# Patient Record
Sex: FEMALE | Race: WHITE | Employment: OTHER | URBAN - NONMETROPOLITAN AREA
[De-identification: names, ages, dates, MRNs, and addresses within clinical notes are randomized per-mention and may not be internally consistent; named-entity substitution may affect disease eponyms.]

---

## 2017-04-11 ENCOUNTER — OFFICE VISIT (OUTPATIENT)
Dept: CARDIOLOGY | Age: 77
End: 2017-04-11

## 2017-04-11 VITALS
WEIGHT: 165 LBS | HEIGHT: 60 IN | SYSTOLIC BLOOD PRESSURE: 146 MMHG | BODY MASS INDEX: 32.39 KG/M2 | HEART RATE: 103 BPM | DIASTOLIC BLOOD PRESSURE: 80 MMHG

## 2017-04-11 DIAGNOSIS — R00.0 SINUS TACHYCARDIA: ICD-10-CM

## 2017-04-11 DIAGNOSIS — R07.9 CHEST PAIN ON EXERTION: Primary | ICD-10-CM

## 2017-04-11 DIAGNOSIS — R00.2 HEART PALPITATIONS: ICD-10-CM

## 2017-04-11 DIAGNOSIS — I10 ESSENTIAL HYPERTENSION: ICD-10-CM

## 2017-04-11 DIAGNOSIS — E78.00 PURE HYPERCHOLESTEROLEMIA: ICD-10-CM

## 2017-04-11 PROCEDURE — G8417 CALC BMI ABV UP PARAM F/U: HCPCS | Performed by: INTERNAL MEDICINE

## 2017-04-11 PROCEDURE — 1123F ACP DISCUSS/DSCN MKR DOCD: CPT | Performed by: INTERNAL MEDICINE

## 2017-04-11 PROCEDURE — 1036F TOBACCO NON-USER: CPT | Performed by: INTERNAL MEDICINE

## 2017-04-11 PROCEDURE — 99214 OFFICE O/P EST MOD 30 MIN: CPT | Performed by: INTERNAL MEDICINE

## 2017-04-11 PROCEDURE — G8427 DOCREV CUR MEDS BY ELIG CLIN: HCPCS | Performed by: INTERNAL MEDICINE

## 2017-04-11 PROCEDURE — 1090F PRES/ABSN URINE INCON ASSESS: CPT | Performed by: INTERNAL MEDICINE

## 2017-04-11 PROCEDURE — 4040F PNEUMOC VAC/ADMIN/RCVD: CPT | Performed by: INTERNAL MEDICINE

## 2017-04-11 PROCEDURE — G8400 PT W/DXA NO RESULTS DOC: HCPCS | Performed by: INTERNAL MEDICINE

## 2017-04-11 PROCEDURE — 93000 ELECTROCARDIOGRAM COMPLETE: CPT | Performed by: INTERNAL MEDICINE

## 2017-04-11 RX ORDER — DILTIAZEM HYDROCHLORIDE 120 MG/1
120 CAPSULE, COATED, EXTENDED RELEASE ORAL DAILY
Qty: 30 CAPSULE | Refills: 5 | Status: SHIPPED | OUTPATIENT
Start: 2017-04-11 | End: 2017-08-31 | Stop reason: SDUPTHER

## 2017-04-11 RX ORDER — ATORVASTATIN CALCIUM 20 MG/1
20 TABLET, FILM COATED ORAL DAILY
Qty: 90 TABLET | Refills: 1 | Status: SHIPPED | OUTPATIENT
Start: 2017-04-11 | End: 2017-08-31 | Stop reason: SDUPTHER

## 2017-04-11 RX ORDER — LISINOPRIL 20 MG/1
20 TABLET ORAL DAILY
Qty: 90 TABLET | Refills: 1 | Status: ON HOLD | OUTPATIENT
Start: 2017-04-11 | End: 2017-05-04

## 2017-04-20 ENCOUNTER — TELEPHONE (OUTPATIENT)
Dept: CARDIOLOGY | Age: 77
End: 2017-04-20

## 2017-04-28 ENCOUNTER — OFFICE VISIT (OUTPATIENT)
Dept: CARDIOLOGY | Age: 77
End: 2017-04-28

## 2017-04-28 VITALS
WEIGHT: 164.2 LBS | HEART RATE: 91 BPM | BODY MASS INDEX: 32.24 KG/M2 | SYSTOLIC BLOOD PRESSURE: 155 MMHG | DIASTOLIC BLOOD PRESSURE: 67 MMHG | HEIGHT: 60 IN

## 2017-04-28 DIAGNOSIS — I10 ESSENTIAL HYPERTENSION: ICD-10-CM

## 2017-04-28 DIAGNOSIS — R00.2 HEART PALPITATIONS: ICD-10-CM

## 2017-04-28 DIAGNOSIS — R94.39 ABNORMAL NUCLEAR STRESS TEST: ICD-10-CM

## 2017-04-28 DIAGNOSIS — R07.9 CHEST PAIN ON EXERTION: Primary | ICD-10-CM

## 2017-04-28 DIAGNOSIS — E78.00 PURE HYPERCHOLESTEROLEMIA: ICD-10-CM

## 2017-04-28 PROCEDURE — 99213 OFFICE O/P EST LOW 20 MIN: CPT | Performed by: INTERNAL MEDICINE

## 2017-04-28 PROCEDURE — 1123F ACP DISCUSS/DSCN MKR DOCD: CPT | Performed by: INTERNAL MEDICINE

## 2017-04-28 PROCEDURE — G8400 PT W/DXA NO RESULTS DOC: HCPCS | Performed by: INTERNAL MEDICINE

## 2017-04-28 PROCEDURE — 1036F TOBACCO NON-USER: CPT | Performed by: INTERNAL MEDICINE

## 2017-04-28 PROCEDURE — 1090F PRES/ABSN URINE INCON ASSESS: CPT | Performed by: INTERNAL MEDICINE

## 2017-04-28 PROCEDURE — G8427 DOCREV CUR MEDS BY ELIG CLIN: HCPCS | Performed by: INTERNAL MEDICINE

## 2017-04-28 PROCEDURE — G8417 CALC BMI ABV UP PARAM F/U: HCPCS | Performed by: INTERNAL MEDICINE

## 2017-04-28 PROCEDURE — 4040F PNEUMOC VAC/ADMIN/RCVD: CPT | Performed by: INTERNAL MEDICINE

## 2017-05-03 PROBLEM — Z95.820 S/P ANGIOPLASTY WITH STENT: Status: ACTIVE | Noted: 2017-05-03

## 2017-05-03 PROBLEM — Z98.890 S/P CARDIAC CATH: Status: ACTIVE | Noted: 2017-05-03

## 2017-05-05 ENCOUNTER — OFFICE VISIT (OUTPATIENT)
Dept: CARDIOLOGY | Age: 77
End: 2017-05-05

## 2017-05-05 VITALS
DIASTOLIC BLOOD PRESSURE: 77 MMHG | WEIGHT: 164.2 LBS | HEIGHT: 60 IN | SYSTOLIC BLOOD PRESSURE: 144 MMHG | HEART RATE: 95 BPM | BODY MASS INDEX: 32.24 KG/M2

## 2017-05-05 DIAGNOSIS — Z95.820 S/P ANGIOPLASTY WITH STENT: Primary | ICD-10-CM

## 2017-05-05 DIAGNOSIS — E78.00 PURE HYPERCHOLESTEROLEMIA: ICD-10-CM

## 2017-05-05 DIAGNOSIS — Z98.890 S/P CARDIAC CATH: ICD-10-CM

## 2017-05-05 DIAGNOSIS — I25.10 CORONARY ARTERY DISEASE INVOLVING NATIVE CORONARY ARTERY OF NATIVE HEART WITHOUT ANGINA PECTORIS: ICD-10-CM

## 2017-05-05 DIAGNOSIS — I10 ESSENTIAL HYPERTENSION: ICD-10-CM

## 2017-05-05 PROCEDURE — G8417 CALC BMI ABV UP PARAM F/U: HCPCS | Performed by: INTERNAL MEDICINE

## 2017-05-05 PROCEDURE — 4040F PNEUMOC VAC/ADMIN/RCVD: CPT | Performed by: INTERNAL MEDICINE

## 2017-05-05 PROCEDURE — 1036F TOBACCO NON-USER: CPT | Performed by: INTERNAL MEDICINE

## 2017-05-05 PROCEDURE — G8427 DOCREV CUR MEDS BY ELIG CLIN: HCPCS | Performed by: INTERNAL MEDICINE

## 2017-05-05 PROCEDURE — 1123F ACP DISCUSS/DSCN MKR DOCD: CPT | Performed by: INTERNAL MEDICINE

## 2017-05-05 PROCEDURE — 1090F PRES/ABSN URINE INCON ASSESS: CPT | Performed by: INTERNAL MEDICINE

## 2017-05-05 PROCEDURE — G8598 ASA/ANTIPLAT THER USED: HCPCS | Performed by: INTERNAL MEDICINE

## 2017-05-05 PROCEDURE — 99213 OFFICE O/P EST LOW 20 MIN: CPT | Performed by: INTERNAL MEDICINE

## 2017-05-05 PROCEDURE — G8400 PT W/DXA NO RESULTS DOC: HCPCS | Performed by: INTERNAL MEDICINE

## 2017-05-15 ENCOUNTER — TELEPHONE (OUTPATIENT)
Dept: CARDIOLOGY | Age: 77
End: 2017-05-15

## 2017-05-26 ENCOUNTER — OFFICE VISIT (OUTPATIENT)
Dept: CARDIOLOGY | Age: 77
End: 2017-05-26

## 2017-05-26 VITALS
BODY MASS INDEX: 31.22 KG/M2 | HEART RATE: 72 BPM | HEIGHT: 60 IN | SYSTOLIC BLOOD PRESSURE: 126 MMHG | WEIGHT: 159 LBS | DIASTOLIC BLOOD PRESSURE: 70 MMHG

## 2017-05-26 DIAGNOSIS — R00.2 HEART PALPITATIONS: ICD-10-CM

## 2017-05-26 DIAGNOSIS — I25.10 CORONARY ARTERY DISEASE INVOLVING NATIVE CORONARY ARTERY OF NATIVE HEART WITHOUT ANGINA PECTORIS: Primary | ICD-10-CM

## 2017-05-26 DIAGNOSIS — I10 ESSENTIAL HYPERTENSION: ICD-10-CM

## 2017-05-26 DIAGNOSIS — E78.00 PURE HYPERCHOLESTEROLEMIA: ICD-10-CM

## 2017-05-26 DIAGNOSIS — Z95.820 S/P ANGIOPLASTY WITH STENT: ICD-10-CM

## 2017-05-26 DIAGNOSIS — Z98.890 S/P CARDIAC CATH: ICD-10-CM

## 2017-05-26 PROCEDURE — G8417 CALC BMI ABV UP PARAM F/U: HCPCS | Performed by: INTERNAL MEDICINE

## 2017-05-26 PROCEDURE — 99214 OFFICE O/P EST MOD 30 MIN: CPT | Performed by: INTERNAL MEDICINE

## 2017-05-26 PROCEDURE — G8400 PT W/DXA NO RESULTS DOC: HCPCS | Performed by: INTERNAL MEDICINE

## 2017-05-26 PROCEDURE — 1123F ACP DISCUSS/DSCN MKR DOCD: CPT | Performed by: INTERNAL MEDICINE

## 2017-05-26 PROCEDURE — G8598 ASA/ANTIPLAT THER USED: HCPCS | Performed by: INTERNAL MEDICINE

## 2017-05-26 PROCEDURE — 4040F PNEUMOC VAC/ADMIN/RCVD: CPT | Performed by: INTERNAL MEDICINE

## 2017-05-26 PROCEDURE — G8427 DOCREV CUR MEDS BY ELIG CLIN: HCPCS | Performed by: INTERNAL MEDICINE

## 2017-05-26 PROCEDURE — 1036F TOBACCO NON-USER: CPT | Performed by: INTERNAL MEDICINE

## 2017-05-26 PROCEDURE — 1090F PRES/ABSN URINE INCON ASSESS: CPT | Performed by: INTERNAL MEDICINE

## 2017-05-26 RX ORDER — LISINOPRIL 10 MG/1
10 TABLET ORAL DAILY
Qty: 90 TABLET | Refills: 2 | Status: SHIPPED | OUTPATIENT
Start: 2017-05-26 | End: 2019-01-14 | Stop reason: SDUPTHER

## 2017-05-26 RX ORDER — METOPROLOL TARTRATE 50 MG/1
50 TABLET, FILM COATED ORAL 2 TIMES DAILY
Qty: 180 TABLET | Refills: 2 | Status: SHIPPED | OUTPATIENT
Start: 2017-05-26 | End: 2017-08-31 | Stop reason: SDUPTHER

## 2017-06-05 ENCOUNTER — TELEPHONE (OUTPATIENT)
Dept: ENDOCRINOLOGY | Age: 77
End: 2017-06-05

## 2017-06-07 ENCOUNTER — OFFICE VISIT (OUTPATIENT)
Dept: ENDOCRINOLOGY | Age: 77
End: 2017-06-07

## 2017-06-07 VITALS
SYSTOLIC BLOOD PRESSURE: 138 MMHG | DIASTOLIC BLOOD PRESSURE: 62 MMHG | HEART RATE: 55 BPM | HEIGHT: 60 IN | RESPIRATION RATE: 20 BRPM | BODY MASS INDEX: 31.51 KG/M2 | WEIGHT: 160.5 LBS

## 2017-06-07 DIAGNOSIS — E04.2 MULTINODULAR GOITER: Primary | ICD-10-CM

## 2017-06-07 DIAGNOSIS — R94.6 ABNORMAL THYROID FUNCTION TEST: ICD-10-CM

## 2017-06-07 PROCEDURE — 4040F PNEUMOC VAC/ADMIN/RCVD: CPT | Performed by: INTERNAL MEDICINE

## 2017-06-07 PROCEDURE — 1036F TOBACCO NON-USER: CPT | Performed by: INTERNAL MEDICINE

## 2017-06-07 PROCEDURE — G8427 DOCREV CUR MEDS BY ELIG CLIN: HCPCS | Performed by: INTERNAL MEDICINE

## 2017-06-07 PROCEDURE — G8417 CALC BMI ABV UP PARAM F/U: HCPCS | Performed by: INTERNAL MEDICINE

## 2017-06-07 PROCEDURE — G8598 ASA/ANTIPLAT THER USED: HCPCS | Performed by: INTERNAL MEDICINE

## 2017-06-07 PROCEDURE — 1090F PRES/ABSN URINE INCON ASSESS: CPT | Performed by: INTERNAL MEDICINE

## 2017-06-07 PROCEDURE — 1123F ACP DISCUSS/DSCN MKR DOCD: CPT | Performed by: INTERNAL MEDICINE

## 2017-06-07 PROCEDURE — 99214 OFFICE O/P EST MOD 30 MIN: CPT | Performed by: INTERNAL MEDICINE

## 2017-06-07 PROCEDURE — G8400 PT W/DXA NO RESULTS DOC: HCPCS | Performed by: INTERNAL MEDICINE

## 2017-06-14 ENCOUNTER — TELEPHONE (OUTPATIENT)
Dept: CARDIOLOGY | Age: 77
End: 2017-06-14

## 2017-06-22 DIAGNOSIS — E04.2 MULTINODULAR GOITER: Primary | ICD-10-CM

## 2017-06-23 ENCOUNTER — TELEPHONE (OUTPATIENT)
Dept: ENDOCRINOLOGY | Age: 77
End: 2017-06-23

## 2017-06-23 DIAGNOSIS — E04.2 MULTINODULAR GOITER: Primary | ICD-10-CM

## 2017-07-17 ENCOUNTER — HOSPITAL ENCOUNTER (OUTPATIENT)
Dept: CARDIAC REHAB | Age: 77
Setting detail: THERAPIES SERIES
Discharge: HOME | End: 2017-07-17
Payer: MEDICARE

## 2017-07-17 ENCOUNTER — APPOINTMENT (OUTPATIENT)
Dept: CARDIAC REHAB | Age: 77
End: 2017-07-17
Payer: MEDICARE

## 2017-07-17 PROCEDURE — G0422 INTENS CARDIAC REHAB W/EXERC: HCPCS

## 2017-07-17 PROCEDURE — G0423 INTENS CARDIAC REHAB NO EXER: HCPCS

## 2017-07-19 ENCOUNTER — HOSPITAL ENCOUNTER (OUTPATIENT)
Dept: CARDIAC REHAB | Age: 77
Setting detail: THERAPIES SERIES
Discharge: HOME | End: 2017-07-19
Payer: MEDICARE

## 2017-07-19 PROCEDURE — G0423 INTENS CARDIAC REHAB NO EXER: HCPCS

## 2017-07-19 PROCEDURE — G0422 INTENS CARDIAC REHAB W/EXERC: HCPCS

## 2017-07-20 ENCOUNTER — HOSPITAL ENCOUNTER (OUTPATIENT)
Age: 77
Setting detail: THERAPIES SERIES
Discharge: HOME | End: 2017-07-20
Payer: MEDICARE

## 2017-07-20 ENCOUNTER — HOSPITAL ENCOUNTER (OUTPATIENT)
Age: 77
Setting detail: THERAPIES SERIES
End: 2017-07-20
Payer: MEDICARE

## 2017-07-21 ENCOUNTER — HOSPITAL ENCOUNTER (OUTPATIENT)
Dept: CARDIAC REHAB | Age: 77
Setting detail: THERAPIES SERIES
Discharge: HOME | End: 2017-07-21
Payer: MEDICARE

## 2017-07-21 PROCEDURE — G0422 INTENS CARDIAC REHAB W/EXERC: HCPCS

## 2017-07-21 PROCEDURE — G0423 INTENS CARDIAC REHAB NO EXER: HCPCS

## 2017-07-24 ENCOUNTER — HOSPITAL ENCOUNTER (OUTPATIENT)
Age: 77
Setting detail: THERAPIES SERIES
Discharge: HOME | End: 2017-07-24
Payer: MEDICARE

## 2017-07-24 ENCOUNTER — HOSPITAL ENCOUNTER (OUTPATIENT)
Dept: CARDIAC REHAB | Age: 77
Setting detail: THERAPIES SERIES
Discharge: HOME | End: 2017-07-24
Payer: MEDICARE

## 2017-07-24 PROCEDURE — G0422 INTENS CARDIAC REHAB W/EXERC: HCPCS

## 2017-07-24 PROCEDURE — G0423 INTENS CARDIAC REHAB NO EXER: HCPCS

## 2017-07-26 ENCOUNTER — HOSPITAL ENCOUNTER (OUTPATIENT)
Dept: CARDIAC REHAB | Age: 77
Setting detail: THERAPIES SERIES
Discharge: HOME | End: 2017-07-26
Payer: MEDICARE

## 2017-07-26 PROCEDURE — G0422 INTENS CARDIAC REHAB W/EXERC: HCPCS

## 2017-07-26 PROCEDURE — G0423 INTENS CARDIAC REHAB NO EXER: HCPCS

## 2017-07-28 ENCOUNTER — APPOINTMENT (OUTPATIENT)
Dept: CARDIAC REHAB | Age: 77
End: 2017-07-28
Payer: MEDICARE

## 2017-07-31 ENCOUNTER — HOSPITAL ENCOUNTER (OUTPATIENT)
Dept: CARDIAC REHAB | Age: 77
Setting detail: THERAPIES SERIES
Discharge: HOME | End: 2017-07-31
Payer: MEDICARE

## 2017-07-31 PROCEDURE — G0422 INTENS CARDIAC REHAB W/EXERC: HCPCS

## 2017-07-31 PROCEDURE — G0423 INTENS CARDIAC REHAB NO EXER: HCPCS

## 2017-08-02 ENCOUNTER — HOSPITAL ENCOUNTER (OUTPATIENT)
Dept: CARDIAC REHAB | Age: 77
Setting detail: THERAPIES SERIES
Discharge: HOME | End: 2017-08-02
Payer: MEDICARE

## 2017-08-02 PROCEDURE — G0423 INTENS CARDIAC REHAB NO EXER: HCPCS

## 2017-08-02 PROCEDURE — G0422 INTENS CARDIAC REHAB W/EXERC: HCPCS

## 2017-08-04 ENCOUNTER — HOSPITAL ENCOUNTER (OUTPATIENT)
Dept: CARDIAC REHAB | Age: 77
Setting detail: THERAPIES SERIES
Discharge: HOME | End: 2017-08-04
Payer: MEDICARE

## 2017-08-04 PROCEDURE — G0422 INTENS CARDIAC REHAB W/EXERC: HCPCS

## 2017-08-04 PROCEDURE — G0423 INTENS CARDIAC REHAB NO EXER: HCPCS

## 2017-08-07 ENCOUNTER — HOSPITAL ENCOUNTER (OUTPATIENT)
Dept: CARDIAC REHAB | Age: 77
Setting detail: THERAPIES SERIES
Discharge: HOME | End: 2017-08-07
Payer: MEDICARE

## 2017-08-07 PROCEDURE — G0423 INTENS CARDIAC REHAB NO EXER: HCPCS

## 2017-08-07 PROCEDURE — G0422 INTENS CARDIAC REHAB W/EXERC: HCPCS

## 2017-08-09 ENCOUNTER — HOSPITAL ENCOUNTER (OUTPATIENT)
Dept: CARDIAC REHAB | Age: 77
Setting detail: THERAPIES SERIES
Discharge: HOME | End: 2017-08-09
Payer: MEDICARE

## 2017-08-09 PROCEDURE — G0422 INTENS CARDIAC REHAB W/EXERC: HCPCS

## 2017-08-09 PROCEDURE — G0423 INTENS CARDIAC REHAB NO EXER: HCPCS

## 2017-08-11 ENCOUNTER — HOSPITAL ENCOUNTER (OUTPATIENT)
Dept: CARDIAC REHAB | Age: 77
Setting detail: THERAPIES SERIES
Discharge: HOME | End: 2017-08-11
Payer: MEDICARE

## 2017-08-11 PROCEDURE — G0422 INTENS CARDIAC REHAB W/EXERC: HCPCS

## 2017-08-11 PROCEDURE — G0423 INTENS CARDIAC REHAB NO EXER: HCPCS

## 2017-08-14 ENCOUNTER — HOSPITAL ENCOUNTER (OUTPATIENT)
Dept: CARDIAC REHAB | Age: 77
Setting detail: THERAPIES SERIES
Discharge: HOME | End: 2017-08-14
Payer: MEDICARE

## 2017-08-14 PROCEDURE — G0423 INTENS CARDIAC REHAB NO EXER: HCPCS

## 2017-08-14 PROCEDURE — G0422 INTENS CARDIAC REHAB W/EXERC: HCPCS

## 2017-08-16 ENCOUNTER — HOSPITAL ENCOUNTER (OUTPATIENT)
Dept: CARDIAC REHAB | Age: 77
Setting detail: THERAPIES SERIES
Discharge: HOME | End: 2017-08-16
Payer: MEDICARE

## 2017-08-16 PROCEDURE — G0423 INTENS CARDIAC REHAB NO EXER: HCPCS

## 2017-08-16 PROCEDURE — G0422 INTENS CARDIAC REHAB W/EXERC: HCPCS

## 2017-08-18 ENCOUNTER — HOSPITAL ENCOUNTER (OUTPATIENT)
Dept: CARDIAC REHAB | Age: 77
Setting detail: THERAPIES SERIES
Discharge: HOME | End: 2017-08-18
Payer: MEDICARE

## 2017-08-18 PROCEDURE — G0422 INTENS CARDIAC REHAB W/EXERC: HCPCS

## 2017-08-18 PROCEDURE — G0423 INTENS CARDIAC REHAB NO EXER: HCPCS

## 2017-08-21 ENCOUNTER — HOSPITAL ENCOUNTER (OUTPATIENT)
Dept: CARDIAC REHAB | Age: 77
Setting detail: THERAPIES SERIES
Discharge: HOME | End: 2017-08-21
Payer: MEDICARE

## 2017-08-21 PROCEDURE — G0422 INTENS CARDIAC REHAB W/EXERC: HCPCS

## 2017-08-21 PROCEDURE — G0423 INTENS CARDIAC REHAB NO EXER: HCPCS

## 2017-08-23 ENCOUNTER — HOSPITAL ENCOUNTER (OUTPATIENT)
Dept: CARDIAC REHAB | Age: 77
Setting detail: THERAPIES SERIES
Discharge: HOME | End: 2017-08-23
Payer: MEDICARE

## 2017-08-23 PROCEDURE — G0423 INTENS CARDIAC REHAB NO EXER: HCPCS

## 2017-08-23 PROCEDURE — G0422 INTENS CARDIAC REHAB W/EXERC: HCPCS

## 2017-08-25 ENCOUNTER — HOSPITAL ENCOUNTER (OUTPATIENT)
Dept: CARDIAC REHAB | Age: 77
Setting detail: THERAPIES SERIES
Discharge: HOME | End: 2017-08-25
Payer: MEDICARE

## 2017-08-25 PROCEDURE — G0422 INTENS CARDIAC REHAB W/EXERC: HCPCS

## 2017-08-25 PROCEDURE — G0423 INTENS CARDIAC REHAB NO EXER: HCPCS

## 2017-08-28 ENCOUNTER — HOSPITAL ENCOUNTER (OUTPATIENT)
Dept: CARDIAC REHAB | Age: 77
Setting detail: THERAPIES SERIES
Discharge: HOME | End: 2017-08-28
Payer: MEDICARE

## 2017-08-28 PROCEDURE — G0422 INTENS CARDIAC REHAB W/EXERC: HCPCS

## 2017-08-28 PROCEDURE — G0423 INTENS CARDIAC REHAB NO EXER: HCPCS

## 2017-08-30 ENCOUNTER — HOSPITAL ENCOUNTER (OUTPATIENT)
Dept: CARDIAC REHAB | Age: 77
Setting detail: THERAPIES SERIES
Discharge: HOME | End: 2017-08-30
Payer: MEDICARE

## 2017-08-30 PROCEDURE — G0422 INTENS CARDIAC REHAB W/EXERC: HCPCS

## 2017-08-30 PROCEDURE — G0423 INTENS CARDIAC REHAB NO EXER: HCPCS

## 2017-08-31 ENCOUNTER — OFFICE VISIT (OUTPATIENT)
Dept: CARDIOLOGY CLINIC | Age: 77
End: 2017-08-31
Payer: MEDICARE

## 2017-08-31 ENCOUNTER — HOSPITAL ENCOUNTER (OUTPATIENT)
Dept: NON INVASIVE DIAGNOSTICS | Age: 77
Discharge: HOME | End: 2017-08-31
Payer: MEDICARE

## 2017-08-31 VITALS
HEART RATE: 52 BPM | HEIGHT: 60 IN | WEIGHT: 154.5 LBS | DIASTOLIC BLOOD PRESSURE: 79 MMHG | SYSTOLIC BLOOD PRESSURE: 152 MMHG | BODY MASS INDEX: 30.33 KG/M2

## 2017-08-31 DIAGNOSIS — R00.1 SINUS BRADYCARDIA: ICD-10-CM

## 2017-08-31 DIAGNOSIS — E78.00 PURE HYPERCHOLESTEROLEMIA: ICD-10-CM

## 2017-08-31 DIAGNOSIS — Z98.890 S/P CARDIAC CATH: ICD-10-CM

## 2017-08-31 DIAGNOSIS — I25.10 CORONARY ARTERY DISEASE INVOLVING NATIVE CORONARY ARTERY OF NATIVE HEART WITHOUT ANGINA PECTORIS: Primary | ICD-10-CM

## 2017-08-31 DIAGNOSIS — I10 ESSENTIAL HYPERTENSION: ICD-10-CM

## 2017-08-31 DIAGNOSIS — I25.10 CORONARY ARTERY DISEASE INVOLVING NATIVE CORONARY ARTERY OF NATIVE HEART WITHOUT ANGINA PECTORIS: ICD-10-CM

## 2017-08-31 PROBLEM — R07.9 CHEST PAIN ON EXERTION: Status: RESOLVED | Noted: 2017-04-11 | Resolved: 2017-08-31

## 2017-08-31 PROBLEM — R94.39 ABNORMAL NUCLEAR STRESS TEST: Status: RESOLVED | Noted: 2017-04-28 | Resolved: 2017-08-31

## 2017-08-31 PROCEDURE — G8598 ASA/ANTIPLAT THER USED: HCPCS | Performed by: INTERNAL MEDICINE

## 2017-08-31 PROCEDURE — 1090F PRES/ABSN URINE INCON ASSESS: CPT | Performed by: INTERNAL MEDICINE

## 2017-08-31 PROCEDURE — 99213 OFFICE O/P EST LOW 20 MIN: CPT | Performed by: INTERNAL MEDICINE

## 2017-08-31 PROCEDURE — 93225 XTRNL ECG REC<48 HRS REC: CPT

## 2017-08-31 PROCEDURE — G8427 DOCREV CUR MEDS BY ELIG CLIN: HCPCS | Performed by: INTERNAL MEDICINE

## 2017-08-31 PROCEDURE — 93226 XTRNL ECG REC<48 HR SCAN A/R: CPT

## 2017-08-31 PROCEDURE — G8417 CALC BMI ABV UP PARAM F/U: HCPCS | Performed by: INTERNAL MEDICINE

## 2017-08-31 PROCEDURE — 4040F PNEUMOC VAC/ADMIN/RCVD: CPT | Performed by: INTERNAL MEDICINE

## 2017-08-31 PROCEDURE — 1123F ACP DISCUSS/DSCN MKR DOCD: CPT | Performed by: INTERNAL MEDICINE

## 2017-08-31 PROCEDURE — G8400 PT W/DXA NO RESULTS DOC: HCPCS | Performed by: INTERNAL MEDICINE

## 2017-08-31 PROCEDURE — 1036F TOBACCO NON-USER: CPT | Performed by: INTERNAL MEDICINE

## 2017-08-31 RX ORDER — DILTIAZEM HYDROCHLORIDE 120 MG/1
120 CAPSULE, COATED, EXTENDED RELEASE ORAL DAILY
Qty: 90 CAPSULE | Refills: 1 | Status: SHIPPED | OUTPATIENT
Start: 2017-08-31 | End: 2018-01-23 | Stop reason: SDUPTHER

## 2017-08-31 RX ORDER — METOPROLOL TARTRATE 50 MG/1
50 TABLET, FILM COATED ORAL 2 TIMES DAILY
Qty: 180 TABLET | Refills: 1 | Status: SHIPPED | OUTPATIENT
Start: 2017-08-31 | End: 2017-10-31

## 2017-08-31 RX ORDER — ATORVASTATIN CALCIUM 20 MG/1
20 TABLET, FILM COATED ORAL DAILY
Qty: 90 TABLET | Refills: 1 | Status: SHIPPED | OUTPATIENT
Start: 2017-08-31 | End: 2018-07-25 | Stop reason: SDUPTHER

## 2017-09-01 ENCOUNTER — HOSPITAL ENCOUNTER (OUTPATIENT)
Dept: CARDIAC REHAB | Age: 77
Setting detail: THERAPIES SERIES
Discharge: HOME | End: 2017-09-01
Payer: MEDICARE

## 2017-09-01 PROCEDURE — G0422 INTENS CARDIAC REHAB W/EXERC: HCPCS

## 2017-09-01 PROCEDURE — G0423 INTENS CARDIAC REHAB NO EXER: HCPCS

## 2017-09-04 ENCOUNTER — APPOINTMENT (OUTPATIENT)
Dept: CARDIAC REHAB | Age: 77
End: 2017-09-04
Payer: MEDICARE

## 2017-09-06 ENCOUNTER — HOSPITAL ENCOUNTER (OUTPATIENT)
Dept: CARDIAC REHAB | Age: 77
Setting detail: THERAPIES SERIES
Discharge: HOME | End: 2017-09-06
Payer: MEDICARE

## 2017-09-06 PROCEDURE — G0423 INTENS CARDIAC REHAB NO EXER: HCPCS

## 2017-09-06 PROCEDURE — G0422 INTENS CARDIAC REHAB W/EXERC: HCPCS

## 2017-09-08 ENCOUNTER — HOSPITAL ENCOUNTER (OUTPATIENT)
Dept: CARDIAC REHAB | Age: 77
Setting detail: THERAPIES SERIES
Discharge: HOME | End: 2017-09-08
Payer: MEDICARE

## 2017-09-08 PROCEDURE — G0422 INTENS CARDIAC REHAB W/EXERC: HCPCS

## 2017-09-08 PROCEDURE — G0423 INTENS CARDIAC REHAB NO EXER: HCPCS

## 2017-09-11 ENCOUNTER — HOSPITAL ENCOUNTER (OUTPATIENT)
Dept: CARDIAC REHAB | Age: 77
Setting detail: THERAPIES SERIES
Discharge: HOME | End: 2017-09-11
Payer: MEDICARE

## 2017-09-11 PROCEDURE — G0422 INTENS CARDIAC REHAB W/EXERC: HCPCS

## 2017-09-11 PROCEDURE — G0423 INTENS CARDIAC REHAB NO EXER: HCPCS

## 2017-09-13 ENCOUNTER — TELEPHONE (OUTPATIENT)
Dept: ADMINISTRATIVE | Age: 77
End: 2017-09-13

## 2017-09-13 ENCOUNTER — HOSPITAL ENCOUNTER (OUTPATIENT)
Dept: CARDIAC REHAB | Age: 77
Setting detail: THERAPIES SERIES
Discharge: HOME | End: 2017-09-13
Payer: MEDICARE

## 2017-09-13 PROCEDURE — G0423 INTENS CARDIAC REHAB NO EXER: HCPCS

## 2017-09-13 PROCEDURE — G0422 INTENS CARDIAC REHAB W/EXERC: HCPCS

## 2017-09-14 ENCOUNTER — TELEPHONE (OUTPATIENT)
Dept: CARDIOLOGY CLINIC | Age: 77
End: 2017-09-14

## 2017-09-15 ENCOUNTER — HOSPITAL ENCOUNTER (OUTPATIENT)
Dept: CARDIAC REHAB | Age: 77
Setting detail: THERAPIES SERIES
Discharge: HOME | End: 2017-09-15
Payer: MEDICARE

## 2017-09-15 PROCEDURE — G0422 INTENS CARDIAC REHAB W/EXERC: HCPCS

## 2017-09-15 PROCEDURE — G0423 INTENS CARDIAC REHAB NO EXER: HCPCS

## 2017-09-18 ENCOUNTER — HOSPITAL ENCOUNTER (OUTPATIENT)
Dept: CARDIAC REHAB | Age: 77
Setting detail: THERAPIES SERIES
Discharge: HOME | End: 2017-09-18
Payer: MEDICARE

## 2017-09-18 PROCEDURE — G0422 INTENS CARDIAC REHAB W/EXERC: HCPCS

## 2017-09-18 PROCEDURE — G0423 INTENS CARDIAC REHAB NO EXER: HCPCS

## 2017-09-20 ENCOUNTER — HOSPITAL ENCOUNTER (OUTPATIENT)
Dept: CARDIAC REHAB | Age: 77
Setting detail: THERAPIES SERIES
Discharge: HOME | End: 2017-09-20
Payer: MEDICARE

## 2017-09-20 PROCEDURE — G0422 INTENS CARDIAC REHAB W/EXERC: HCPCS

## 2017-09-20 PROCEDURE — G0423 INTENS CARDIAC REHAB NO EXER: HCPCS

## 2017-09-22 ENCOUNTER — HOSPITAL ENCOUNTER (OUTPATIENT)
Dept: CARDIAC REHAB | Age: 77
Setting detail: THERAPIES SERIES
Discharge: HOME | End: 2017-09-22
Payer: MEDICARE

## 2017-09-22 PROCEDURE — G0423 INTENS CARDIAC REHAB NO EXER: HCPCS

## 2017-09-22 PROCEDURE — G0422 INTENS CARDIAC REHAB W/EXERC: HCPCS

## 2017-09-25 ENCOUNTER — HOSPITAL ENCOUNTER (OUTPATIENT)
Dept: CARDIAC REHAB | Age: 77
Setting detail: THERAPIES SERIES
Discharge: HOME | End: 2017-09-25
Payer: MEDICARE

## 2017-09-25 PROCEDURE — G0422 INTENS CARDIAC REHAB W/EXERC: HCPCS

## 2017-09-25 PROCEDURE — G0423 INTENS CARDIAC REHAB NO EXER: HCPCS

## 2017-09-27 ENCOUNTER — HOSPITAL ENCOUNTER (OUTPATIENT)
Dept: CARDIAC REHAB | Age: 77
Setting detail: THERAPIES SERIES
Discharge: HOME | End: 2017-09-27
Payer: MEDICARE

## 2017-09-27 PROCEDURE — G0422 INTENS CARDIAC REHAB W/EXERC: HCPCS

## 2017-09-27 PROCEDURE — G0423 INTENS CARDIAC REHAB NO EXER: HCPCS

## 2017-09-28 ENCOUNTER — HOSPITAL ENCOUNTER (OUTPATIENT)
Dept: ULTRASOUND IMAGING | Age: 77
Discharge: HOME | End: 2017-09-28
Payer: MEDICARE

## 2017-09-28 DIAGNOSIS — E04.2 MULTINODULAR GOITER: ICD-10-CM

## 2017-09-28 PROCEDURE — 76536 US EXAM OF HEAD AND NECK: CPT

## 2017-09-29 ENCOUNTER — HOSPITAL ENCOUNTER (OUTPATIENT)
Dept: CARDIAC REHAB | Age: 77
Setting detail: THERAPIES SERIES
Discharge: HOME | End: 2017-09-29
Payer: MEDICARE

## 2017-09-29 PROCEDURE — G0423 INTENS CARDIAC REHAB NO EXER: HCPCS

## 2017-09-29 PROCEDURE — G0422 INTENS CARDIAC REHAB W/EXERC: HCPCS

## 2017-10-02 ENCOUNTER — HOSPITAL ENCOUNTER (OUTPATIENT)
Dept: CARDIAC REHAB | Age: 77
Setting detail: THERAPIES SERIES
Discharge: HOME | End: 2017-10-02
Payer: MEDICARE

## 2017-10-02 PROCEDURE — G0422 INTENS CARDIAC REHAB W/EXERC: HCPCS

## 2017-10-02 PROCEDURE — G0423 INTENS CARDIAC REHAB NO EXER: HCPCS

## 2017-10-02 NOTE — PLAN OF CARE
Disabled     [x] Retired   Return to work:  Has Sven Jeffrey returned to work? [] Yes    [x] No               Return to work:  na   Orthopedic Limitations/  [x] Yes    [] No  If yes please list:  Surgery on both knees     Orthopedic Limitations  *If patient has orthopedic issue:  Knees   Actions/  accomodations needed to make Sven Jeffrey successful :No AD, uses [employer] Orthopedic Limitations    same Orthopedic Limitations  same   Orthopedic Limitations  TM and NS only     Fall Risk  Fall risk assessed? [x] Yes      [] No    Balance Issues? [x] Yes      [] No     [] Walker [] Cane    [x] Safety issues reviewed      Fall Risk  *If patient is a fall risk, action needed to accommodate:  na Fall Risk Fall Risk Fall Risk  Had no issues   Home Exercise  [x] Yes    [] No  Type: walking  Frequency:daily x2  Duration: 20 min Home Exercise  [] Yes    [] No  Type: **  Frequency:**  Duration: ** Home Exercise  [x] Yes    [] No  Type: walking  Frequency: daily  Duration: 25 min Home Exercise  [x] Yes    [] No  Type: walking and bike  Frequency: daily  Duration: 20-30 min Home Exercise  x Yes    [] No  Type: bike  Frequency: 3d/w  Duration: 30 minutes   Angina with Activity? [] Yes    [x] No  Angina Management: n/a Angina with Activity? [] Yes    [] No  Angina Management: ** Angina with Activity? [] Yes    [x] No  Angina Management: ** Angina with Activity? [] Yes    [x] No  Angina Management: ** Angina with Activity?   [] Yes    [x] No  Angina Management: **   EXERCISE PLAN EXERCISE PLAN EXERCISE PLAN EXERCISE PLAN EXERCISE PLAN   *Interventions* *Interventions* *Interventions* *Interventions* *Interventions*   Exercise Prescription  (per physician & CR staff) Exercise Prescription  (per physician & CR staff) Exercise Prescription  (per physician & CR staff) Exercise Prescription  (per physician & CR staff) Exercise Prescription  (per physician & CR staff)   Cardiovascular Cardiovascular Cardiovascular Cardiovascular Cardiovascular intensity 5-10% over the next 4 weeks Progression:  Increase time/intensity when RPE <13, and HR is in [facility]   [x] Yes      [] No  Upper and Lower body strength training 2x/wk    Wt: 2-5#       Reps:  8-15    *Increase wt. after completing 15 reps with an RPE of <12/13. [x] Yes      [] No  Upper and Lower body strength training 2x/wk    Wt: 2#       Reps:  10-15    *Increase wt. after completing 15 reps with an RPE of <12/13. [x] Yes      [] No  Upper and Lower body strength training 2x/wk    Wt: 2#       Reps: 8-15    *Increase wt. after completing 15 reps with an RPE of <12/13. [x] Yes      [] No  Upper and Lower body strength training 2x/wk    Wt: 2#       Reps:  8-15    *Increase wt. after completing 15 reps with an RPE of <12/13. Continue Strength Training at home   [] Exercise Log & Strength training handout given - pt declined wanting it     Wt: 2#       Reps: 15    *Increase wt. after completing 15 reps with an RPE of <12/13. Flexibility Flexibility Flexibility Flexibility Flexibility   [x] Yes      [] No  25 min session of Core Strength & Flexibility 1x/per week  Attends Core Strength & Flexibility   [x] Yes      [] No Attends Core Strength & Flexibility   [x] Yes      [] No Attends Core Strength & Flexibility   [x] Yes      [] No Continue Core Strength & Flexibility at home   [facility] verbalizes planning to continue current home exercise. Home Exercise  *Lev Badillo verbalizes planning to walk 3-4 days/week for 30 min on days not in rehab. [address] planning to continue home walking and work on increasing time [address] planning to walk/bike 3-4 days/week for 20.30 min on days not in rehab.  Home Exercise  *Lev Badillo verbalizes his/her plan to walk and bike 5-7 days/week for 30-60 min @ home   *Education* *Education* *Education* *Education* *Education*   Pulse Taking  [] Yes      [x] No  RPE Scale  [x] Yes      [] No  Exercise Safety  [x] Yes      [] No  Equipment Orientation  [x] Yes      [] No  S/S to Report  [x] Yes      [] No  Warm Up/Cool Down  [x] Yes      [] No  Physically Active  [] Yes      [] No Pulse Taking  [] Yes      [x] No  RPE Scale  [x] Yes      [] No  Exercise Safety  [x] Yes      [] No  Equipment Orientation  [x] Yes      [] No  S/S to Report  [x] Yes      [] No  Warm Up/Cool Down  [x] Yes      [] No  Physically Active  [x] Yes      [] No   All Exercise Education Completed  [x] Yes      [] No   *Goals* *Goals* *Goals* *Goals* *Goals*   Initial Exercise Goals Exercise Goals  Exercise Goals   Exercise Goals  Exercise Goals   Angélica plans to:  [x] Attend exercise sessions 3x/wk  [x] continue current home exercise  [x] Increase 6 min walk distance by 10%  [] ** Angélica plans to:  [x] Attend exercise sessions 3x/wk  [x] continue home exercise 2-3x/wk for 20-30 min  [] ** Angélica's plans to:  [x] Attend exercise sessions 3x/wk  [x] continue home exercise 2-3x/wk for 30-45 min  [x] Determine plan of exercise following rehab  [] ** Angélica's plans to:  Angélica's plans to:  [x] Attend exercise sessions 3x/wk  [x] continue home exercise 2-3x/wk for 30-45 min  [x] Determine plan of exercise following rehab     Yousif Wynne achieved exercise goals?  x Yes    [] No  If no, why?  **  [x] Increased 6 min walk distance by 10%  [x] Currently exercising 30-60 min/day, 5-7days/wk   [x] Plans to continue exercise on own  [] Plans to join a local fitness center to continue exercise  [] Does not plan to continue to exercise after rehab   Return to ADL or Hobbies:  Yousif Wynne would like to improve strength and endurance so she is able to return to push mowing yard Return to ADL or Hobbies:  Yousif Wynne would like to improve strength and endurance so she is able to return to mowing the lawn Return to ADL or Hobbies:  Yousif Wynne would like to improve strength and endurance so she is able to return to mowing lawn Return to ADL or Hobbies:  Johnathon Sanford would like to improve strength and endurance so she is able to return to all previous activities Return to ADL or Hobbies:  Johnathon Sanford is now done rehab and states she will be allowed to [facility] now.     *MET level required for above goal:  5.5 METs MET level Achieved:  2.3 METS MET level Achieved:  3.0 METS MET level Achieved:  3.7METS MET level Achieved: 4 METs     Individual Cardiac Treatment Plan - Nutrition  NUTRITION  ASSESSMENT/PLAN NUTRITION  REASSESSMENT NUTRITION   REASSESSMENT NUTRITION   REASSESSMENT NUTRITION  DISCHARGE/FOLLOW-UP   Stages of Change Stages of Change Stages of Change Stages of Change Stages of Change   [] Pre Contemplation  [x] Contemplation  [] Preparation  [] Action  [] Maintenance  [] Relapse [] Pre Contemplation  [] Contemplation  [x] Preparation  [] Action  [] Maintenance  [] Relapse [] Pre Contemplation  [] Contemplation  [] Preparation  [x] Action  [] Maintenance  [] Relapse [] Pre Contemplation  [x] Contemplation  [] Preparation  [] Action  [] Maintenance  [] Relapse [] Pre Contemplation  [] Contemplation  [] Preparation  [x] Action  [] Maintenance  [] Relapse   NUTRITION ASSESSMENT NUTRITION ASSESSMENT NUTRITION ASSESSMENT NUTRITION ASSESSMENT NUTRITION ASSESSMENT   Weight Management  Weight: 162.5        Height: 5'0   BMI: 31.8  Weight Management  Weight: 163                 Weight Management  Weight: 157.6                  Weight Management  Weight: 159.2 Weight Management  Weight: 159.2                 Height: **   BMI: 31.1   Eating Plan  Current eating habits: does not follow any diet/eating plan Eating Plan  Changes: more fruits and veggies Eating Plan  Changes: Same Eating Plan  Changes: none reported Eating Plan Improvements: lower salt and lower sugar   Alcohol Use  [x] none          [] daily  [] weekly      [] special        Diet Assessment Tool:  RATE YOUR PLATE  *Given to patient to complete and return. Diet Assessment Tool:    Score: **/69       Diet Assessment Tool: RATE YOUR PLATE  Score: **/61    NOT RETURNED       NUTRITION PLAN NUTRITION PLAN NUTRITION PLAN NUTRITION PLAN NUTRITION PLAN   *Interventions* *Interventions* *Interventions* *Interventions* *Interventions*   Initial Survey given Goal Setting Discussion:   [] Yes      [] No       Follow Up Survey Reviewed & Goals Updated: Not completed per patient     Professional Referral  Please check if needed. [] Dietician Consult   [] Wt. Management Referral  [] Other:  Professional Referral  Please check if needed. [] Dietician Consult   [] Wt. Management Referral  [] Other: Professional Referral  Please check if needed. [] Dietician Consult   [] Wt. Management Referral  [] Other: Professional Referral  Please check if needed. [] Dietician Consult   [] Wt. Management Referral  [] Other: Professional Referral  Please check if needed. [] Dietician Consult   [] Wt. Management Referral  [] Other:   *Education* *Education* *Education* *Education* *Education*   Nutritional Education Recommended    [x] 1:1 Registered Dietitian    Workshops  [x] Label Reading   [x] Menu  [x] Targeting Nutrition Priorities  [x] Fueling a Healthy Body   Nutritional Education Attended/Date  [x] Fueling a Healthy Body 7/12/17 Nutritional Education Attended/Date    8/2/17 1:1   8/23/17 Label Reading   Nutritional Education Attended/Date  9/3/17 Menu   All Sessions Completed?     [x] Yes  [] No   Cooking School    [x] 11 sessions recommended  Cooking School    Session  -  Date    1 -adding flavor 7/14/17  2 -fast breakfast 7/21/17     Cooking School    Session -  Date  8/4/17 video Soups & desserts  8/11/17 Simple sides  8/18/17 Apps & Snacks Cooking School    Session  -  Date    9 - 2/48/54 delicious desserts  10 - 9/1/17 plant protien  11 - 9/8/17 weekend breakfast  12 9/15/17 fast evening meals Cooking School    # of sessions completed: 10   *Goals* *Goals* *Goals* *Goals* *Goals*   Angélica's nutritional goals are as follows:  Complete and return diet survey Angélica's nutritional goals are as follows:  [x] Attend Nutrition Workshops  [x] Attend 1:1   [x] Attend Cooking Classes  [] ** Angélica's nutritional goals are as follows:  [x] Attend Nutrition Workshops  [] Attend 1:1   [x] Attend Cooking Classes  [x] Complete and return diet survey  [] ** Angélica's nutritional goals are as follows:  [x] Attend Nutrition Workshops  [x] Attend 1:1   [x] Attend Cooking Classes  [] ** Milly Levy achieved nutritional goals   [x] Yes    [] No  If no, why? Use knowledge gained to continue Pritikin eating plan at home       Individual Cardiac Treatment Plan - Psychosocial  PSYCHOSOCIAL  ASSESSMENT/PLAN PSYCHOSOCIAL  REASSESSMENT PSYCHOSOCIAL   REASSESSMENT PSYCHOSOCIAL   REASSESSMENT PSYCHOSOCIAL  DISCHARGE/FOLLOW-UP   Stages of Change Stages of Change Stages of Change Stages of Change Stages of Change   [] Pre Contemplation  [] Contemplation  [] Preparation  [] Action  [x] Maintenance  [] Relapse [] Pre Contemplation  [] Contemplation  [x] Preparation  [] Action  [] Maintenance  [] Relapse [] Pre Contemplation  [] Contemplation  [] Preparation  [x] Action  [] Maintenance  [] Relapse [] Pre Contemplation  [] Contemplation  [] Preparation  [] Action  [x] Maintenance  [] Relapse [] Pre Contemplation  [] Contemplation  [] Preparation  [] Action  [x] Maintenance  [] Relapse   PSYCHOSOCIAL ASSESSMENT PSYCHOSOCIAL ASSESSMENT PSYCHOSOCIAL ASSESSMENT PSYCHOSOCIAL ASSESSMENT PSYCHOSOCIAL ASSESSMENT   Behavioral Outcomes Behavioral Outcomes Behavioral Outcomes Behavioral Outcomes Behavioral Outcomes   Tool Used:  Dee & Noel, Quality of Life Index, Cardiac Version IV  *Given to patient to complete.  Tool Used:    Dee & Noel, Quality of Life Index, Cardiac Version IV   declined   Tool Used:    _Pt declined the surveys again     PHQ-9 score 0  Depression Severity  [x]Minimal  []Mild Referral  [x] Stress Management Skills *Please check if needed  [] Psych Consult  [] Physician Referral  [] Stress Management Skills *Please check if needed  [] Psych Consult  [] Physician Referral  [x] Stress Management Skills *Please check if needed  [] Psych Consult  [] Physician Referral  [] Stress Management Skills   Is patient currently taking anti-depressant or anti-anxiety medications? [] Yes      [x] No Change in anti-depressant or anti-anxiety medications? [] Yes      [x] No Change in anti-depressant or anti-anxiety medications? [] Yes      [x] No Change in anti-depressant or anti-anxiety medications? [] Yes      [x] No   Change in anti-depressant or anti-anxiety medications? [] Yes      [x] No  If yes, please list medications:  **   *Education* *Education* *Education* *Education* *Education*   Healthy Mind-Set Workshops Recommended  [x] Stress & Health  [x] Taking Charge of Stress  [x] New Thoughts, New Behaviors  [x] Managing Moods & Relationships Healthy Mind-Set Workshops Attended/Date  [x] Stress & Health 7/19/17 Healthy Mind-Set Workshops Attended/Date  8/9/17Taking Charge of Stress Healthy Mind-Set Workshops Attended/Date  8/30/17 New Thoughts, New Behaviors Healthy Mind-Set Workshops  Completed  [x] Yes      [] No   *Goals* *Goals* *Goals* *Goals* *Goals*   Angélica's psychosocial goals are as follows:  Complete HADS & Dee Cohen Quality of Life Index, Cardiac Version IV Angélica's psychosocial goals are as follows:  [x] Attend Healthy Mind-Set Workshops  [x] Reduce depression symptom severity by 1 level  [] ** Angélica's psychosocial goals are as follows:  [x] Attend Healthy Mind-Set Workshops  [x] Reduce depression symptom severity by 1 level  [] ** Angélica's psychosocial goals are as follows:  [x] Attend Healthy Mind-Set Workshops  [x] Reduce depression symptom severity by 1 level  [] Orren Bodily achieved psychosocial goals?   [x] Yes    [] No  If no, why?  **  [] Use methods learned to continue to reduce depression symptom severity by 1 level  [] **     Individual Cardiac Treatment Plan - Other:  Risk Factor/Education  RISK FACTOR  ASSESSMENT/PLAN RISK FACTOR  REASSESSMENT  RISK FACTOR  REASSESSMENT RISK FACTOR  REASSESSMENT RISK FACTOR   DISCHARGE/FOLLOW-UP   Stages of Change Stages of Change Stages of Change Stages of Change Stages of Change   [] Pre Contemplation  [] Contemplation  [x] Preparation  [] Action  [] Maintenance  [] Relapse [] Pre Contemplation  [] Contemplation  [] Preparation  [x] Action  [] Maintenance  [] Relapse [] Pre Contemplation  [] Contemplation  [] Preparation  [x] Action  [] Maintenance  [] Relapse [] Pre Contemplation  [] Contemplation  [x] Preparation  [] Action  [] Maintenance  [] Relapse [] Pre Contemplation  [] Contemplation  [] Preparation  [x] Action  [] Maintenance  [] Relapse   RISK FACTOR/EDUCATION ASSESSMENT RISK FACTOR/EDUCATION ASSESSMENT RISK FACTOR/EDUCATION ASSESSMENT RISK FACTOR/EDUCATION ASSESSMENT RISK FACTOR /EDUCATION ASSESSMENT   Hypertension  [x] Yes      [] No    Resting BP: 132/70  Peak Ex BP:124/64  Medication: lisiopril, metoprolol tartrate   Hypertension  Resting BP: 124/74  Peak Ex BP:142/80  Medication Changes:  [] Yes      [x] No Hypertension  Resting BP: 132/72  Peak Ex BP:154/80  Medication Changes:  [] Yes      [x] No Hypertension  Resting BP: 124/64 Peak Ex BP:126/58    Medication Changes:  [] Yes      [x] No Hypertension  Resting BP: 124/62  Peak Ex BP: 132/58  Medication Changes:  [] Yes      [x] No   Lipids  HLD/DLD  [x] Yes      [] No  TOTAL CHOL: 190  HDL:  52  LDL:  93  TRI  Medication: atrovastatin Lipids  Medication Changes:  [] Yes      [x] No     Lipids  Medication Changes:  [] Yes      [x] No     Lipids  Medication Changes:  [] Yes      [x] No     Lipids    Not redrawn   Diabetes  [] Yes      [x] No         Tobacco Use  [] Current  [] Former  [x] Never   Tobacco Use  Change in smoking status   [] Yes      [x] Referrals:  *Please check if needed  [] Diabetes Clinic  [] Lipid Clinic   [] Other:     Professional Referrals:  *Please check if needed  [] Diabetes Clinic  [] Lipid Clinic   [] Other:   Preventative Medication Preventative Medication Preventative Medication Preventative Medication Preventative Medication   Aspirin  [x] Yes    [] No  Blood Thinner: Clopidogrel/Effient/Brillinta  [x] Yes    [] No  Beta Blocker  [x] Yes    [] No  Ace Inhibitor  [x] Yes    [] No  Statin/Lipid Lowering  [x] Yes    [] No Medication Changes? [] Yes    [x] No Medication Changes? [] Yes    [x] No Medication Changes? [] Yes    [x] No Medication Changes? [] Yes    [x] No   *Education* *Education* *Education* *Education* *Education*   Does Verdie Kugel require any additional education? [] Yes    [x] No   Does Verdie Kugel require any additional education? [] Yes    [x] No Does Verdie Kugel require any additional education? [] Yes    [x] No Does Verdie Kugel require any additional education? [] Yes    [x] No Does Verdie Kugel require any additional education?   [] Yes    [x] No   Recommended Additional Educational Videos    Medical  [] Hypertension & Heart Disease  [] Decoding Lab Results  [] Aging Enhancing Your QoL  [] Sleep Disorders  Exercise  [] Body Composition  [] Improve Performance  [] Exercise Action Plan  [] Intro to Yoga  Behavioral  [] How Our Thoughts Can Heal Our Hearts  [] Smoking Cessation  Nutrition  [] Planning Your Eating Strategy  [] Lable Reading- Part 2  [] Dining Out - Part 2  [] Targeting Your Nutrition Priorities  [] Fueling a Healthy Body  [] Menu Workshop  [employer] [] Breakfast & Snacks  [] [employer] Salads & Dressing  [] [address] [place] [address]  [] Soups & Desserts   Additional Educational Videos Completed Additional Educational Videos Completed Additional Educational Videos Completed Additional Educational Videos Completed  -NA  [] Yes    [] No   *Goals* *Goals* *Goals* *Goals* *Goals*   Angélica's risk factor/education goals are as follows:    [x] Optimal BP <140/90  [] Blood Sugar <  [x] Attend recommended video education sessions  [x] Takes medications as prescribed 100% of the time   [] ** Angélica's risk factor/education goals are as follows:    [x] Optimal BP <140/90  [] Blood Sugar <  [x] Attend recommended video education sessions  [x] Takes medications as prescribed 100% of the time   [] ** Angélica's risk factor/education goals are as follows:    [x] Optimal BP <140/90  [] Blood Sugar <  [x] Attend recommended video education sessions  [x] Takes medications as prescribed 100% of the time   [] ** Angélica's risk factor/education goals are as follows:    [x] Optimal BP <140/90  [] Blood Sugar <  [x] Attend recommended video education sessions  [x] Takes medications as prescribed 100% of the time   [] Gearldine Ramos achieved risk factor goals?   [x] Yes    [] No  If no, why?  **     Monitored telemetry has revealed NSR  [] documented arrhythmia at increasing workloads  [] associated symptoms  Monitored telemetry has revealed : NSR   Monitored telemetry has revealed NSR  [] documented arrhythmia at increasing workloads  [] associated symptoms ** Monitored telemetry has revealed: NSR   Monitored telemetry has revealed NSR  [] documented arrhythmia at increasing workloads  [] associated symptoms **   Physician Response    [x] Cardiac rehab is reasonably and medically necessary for continuous cardiac monitoring surveillance  of patient's cardiac activity  [x] Initiate continuous telemerty monitoring and notify me with any concerns  [] Other   Physician Response    [x] Cardiac rehab is reasonably and medically necessary for continuous cardiac monitoring surveillance  of patient's cardiac activity  [x] Continue continuous telemerty monitoring and notify me with any concerns  [] Other     Physician Response      [x] Cardiac rehab is reasonably and medically necessary for continuous cardiac monitoring surveillance  of patient's cardiac

## 2017-10-02 NOTE — PROGRESS NOTES
Video Education Report - ICR/CR      Name:  Miah Rivers     Date:  10/2/2017  MRN: [id number]     Session #:    Session Length: 45 min    Recommended Videos    Additional Videos   []01 [employer] - Program Overview  []17 Hypertension & Heart Disease  []02 Overview of Pritikin Eating Plan   []18 Cooking Breakfasts and Snacks  []03 Becoming a Pritikin    []19 Planning Your Eating Strategy  []04 Diseases of Our Time - Part 1   [x]20 Label Reading - Part 2  []05 Calorie Density     []21 Cooking Soups and Desserts  []06 Label Reading - Part 1    []22 How Our Thoughts Can Heal Our Hearts  []07 Move it      []23 Targeting Your Nutrition Priorities  []08 Healthy Minds, Bodies, Hearts   []24 Healthy Salads & Dressings  []09 Dining Out - Part 1    []25 Dining Out - Part 2  []10 Heart Disease Risk Reduction   []26 Cooking Dinner and Sides  []34 Metabolic Syndrome and Belly Fat  []27 Sleep Disorders  []12 Facts on Fat     []28 Menu Workshop  []13 Diseases of Our Time - Part 2   []29 Decoding Lab Results  []14 Biology of Weight Control   []30 Vitamins and Minerals  []15 Biomechanical Limitations   []31 Exercise Action Plan  []16 Nutrition Action Plan    []32 Body Composition         []33 Improving Performance         []34 Fueling a Healthy Body         []35 Introduction to Yoga         []36 Aging- Enhancing the Quality of Your Life         []37 Smoking Cessation      Comments:  Video completed          Electronically signed by Linda Chase on 10/2/2017 at 2:13 PM  Staff Signature/Date/Time

## 2017-10-04 ENCOUNTER — HOSPITAL ENCOUNTER (OUTPATIENT)
Age: 77
Discharge: HOME | End: 2017-10-04
Payer: MEDICARE

## 2017-10-04 ENCOUNTER — TELEPHONE (OUTPATIENT)
Dept: ENDOCRINOLOGY | Age: 77
End: 2017-10-04

## 2017-10-04 DIAGNOSIS — Z98.890 S/P CARDIAC CATH: ICD-10-CM

## 2017-10-04 DIAGNOSIS — I10 ESSENTIAL HYPERTENSION: ICD-10-CM

## 2017-10-04 DIAGNOSIS — E78.00 PURE HYPERCHOLESTEROLEMIA: ICD-10-CM

## 2017-10-04 DIAGNOSIS — I25.10 CORONARY ARTERY DISEASE INVOLVING NATIVE CORONARY ARTERY OF NATIVE HEART WITHOUT ANGINA PECTORIS: ICD-10-CM

## 2017-10-04 DIAGNOSIS — R00.1 SINUS BRADYCARDIA: ICD-10-CM

## 2017-10-04 LAB
ALBUMIN SERPL-MCNC: 4.4 G/DL (ref 3.5–5.1)
ALP BLD-CCNC: 91 U/L (ref 38–126)
ALT SERPL-CCNC: 20 U/L (ref 11–66)
AST SERPL-CCNC: 21 U/L (ref 5–40)
BILIRUB SERPL-MCNC: 0.6 MG/DL (ref 0.3–1.2)
BILIRUBIN DIRECT: < 0.2 MG/DL (ref 0–0.3)
CHOLESTEROL, TOTAL: 148 MG/DL (ref 100–199)
HDLC SERPL-MCNC: 49 MG/DL
LDL CHOLESTEROL CALCULATED: 67 MG/DL
TOTAL PROTEIN: 6.9 G/DL (ref 6.1–8)
TRIGL SERPL-MCNC: 159 MG/DL (ref 0–199)

## 2017-10-04 PROCEDURE — 36415 COLL VENOUS BLD VENIPUNCTURE: CPT

## 2017-10-04 PROCEDURE — 80076 HEPATIC FUNCTION PANEL: CPT

## 2017-10-04 PROCEDURE — 80061 LIPID PANEL: CPT

## 2017-10-17 ENCOUNTER — OFFICE VISIT (OUTPATIENT)
Dept: CARDIOLOGY CLINIC | Age: 77
End: 2017-10-17
Payer: MEDICARE

## 2017-10-17 VITALS
DIASTOLIC BLOOD PRESSURE: 84 MMHG | SYSTOLIC BLOOD PRESSURE: 162 MMHG | BODY MASS INDEX: 30.35 KG/M2 | WEIGHT: 154.6 LBS | HEIGHT: 60 IN | HEART RATE: 51 BPM

## 2017-10-17 DIAGNOSIS — R06.02 SOB (SHORTNESS OF BREATH) ON EXERTION: ICD-10-CM

## 2017-10-17 DIAGNOSIS — I10 ESSENTIAL HYPERTENSION: ICD-10-CM

## 2017-10-17 DIAGNOSIS — I25.10 CORONARY ARTERY DISEASE INVOLVING NATIVE CORONARY ARTERY OF NATIVE HEART WITHOUT ANGINA PECTORIS: Primary | ICD-10-CM

## 2017-10-17 DIAGNOSIS — E78.00 PURE HYPERCHOLESTEROLEMIA: ICD-10-CM

## 2017-10-17 DIAGNOSIS — R00.2 HEART PALPITATIONS: ICD-10-CM

## 2017-10-17 DIAGNOSIS — Z95.820 S/P ANGIOPLASTY WITH STENT: ICD-10-CM

## 2017-10-17 DIAGNOSIS — Z98.890 S/P CARDIAC CATH: ICD-10-CM

## 2017-10-17 PROCEDURE — G8427 DOCREV CUR MEDS BY ELIG CLIN: HCPCS | Performed by: INTERNAL MEDICINE

## 2017-10-17 PROCEDURE — 4040F PNEUMOC VAC/ADMIN/RCVD: CPT | Performed by: INTERNAL MEDICINE

## 2017-10-17 PROCEDURE — 1123F ACP DISCUSS/DSCN MKR DOCD: CPT | Performed by: INTERNAL MEDICINE

## 2017-10-17 PROCEDURE — G8400 PT W/DXA NO RESULTS DOC: HCPCS | Performed by: INTERNAL MEDICINE

## 2017-10-17 PROCEDURE — 1090F PRES/ABSN URINE INCON ASSESS: CPT | Performed by: INTERNAL MEDICINE

## 2017-10-17 PROCEDURE — 1036F TOBACCO NON-USER: CPT | Performed by: INTERNAL MEDICINE

## 2017-10-17 PROCEDURE — 99215 OFFICE O/P EST HI 40 MIN: CPT | Performed by: INTERNAL MEDICINE

## 2017-10-17 PROCEDURE — G8484 FLU IMMUNIZE NO ADMIN: HCPCS | Performed by: INTERNAL MEDICINE

## 2017-10-17 PROCEDURE — G8417 CALC BMI ABV UP PARAM F/U: HCPCS | Performed by: INTERNAL MEDICINE

## 2017-10-17 PROCEDURE — G8598 ASA/ANTIPLAT THER USED: HCPCS | Performed by: INTERNAL MEDICINE

## 2017-10-17 NOTE — PROGRESS NOTES
Subjective:      Patient ID: Melodie Montes is a 68 y.o. female. HPI  Chief Complaint   Patient presents with    Palpitations    Coronary Artery Disease    Hyperlipidemia    Bradycardia     1yr f/u today- Has some \"dull pain in her Rt breast when she touches it\". NONtender and had Mamogram and was okay   The Chest discomfort on the RT siae last 2 sec once in a while   The chest discomfort is not related to exercise  Awaiting for Barium eval  Has dysphagia at times  Has gotten better in last couple months. She is not sure if it is heartburn. Patient in for follow up for palpitations. Pat complains of palpitation on her ear- happens only in the am when she wake up in the morning and happens in the last 4 days  Had similar problems 4 months back and got better after lopressor    Further interrogation revealed today  Kathlynn Ganser still mourning her husbands's death which happened 8 year back  The two daughter confirmed that and the whole family still  Mourning  On talking more pat was about to cry and got emotional and one her daughter left the room-she was answering her mom my Q  It is obvious pat is anxious and stressed ? ? Depression? ? And we discussed to consider antidepressant if nuc stress neg    Very infrequent heart palpitation noted  Rather had pulsations in right ear,     No leg edema    Denied cp  Denied chest pain, sob, palpitation , dizziness or leg edema    Started on lopressor for HTN and palpit and could not tolerate - hence stop it. The pyrosis got better after nexium 40 mg po qd    Patient Seen, Chart, Consults notes, Labs, Radiology studies reviewed.     [address] [facility],[address]  Father had stent at age -63's    Patient Active Problem List   Diagnosis    Multinodular goiter    Hyperlipidemia    Hypertension    Abnormal LFTs- may 2014- resolved    GERD (gastroesophageal reflux disease)    Hepatic cyst-under F/u with GI    Heart palpitations    Sinus tachycardia    S/P cardiac cath- 5/3/17- LM-P, LAD MID mass, no tenderness, no bony tenderness, no crepitus, no edema, no deformity and no swelling. Abdominal: Soft. Normal appearance and bowel sounds are normal. There is no hepatosplenomegaly. No tenderness. She has no CVA tenderness. Musculoskeletal: Normal range of motion. She exhibits no edema and no tenderness. Lymphadenopathy:     She has no cervical adenopathy. She has no axillary adenopathy. Right: No inguinal adenopathy present. Left: No inguinal adenopathy present. Neurological: She is alert and oriented to person, place, and time. No cranial nerve deficit or sensory deficit. She exhibits normal muscle tone. Coordination and gait normal.   Skin: Skin is warm and dry. No abrasion, no bruising and no rash noted. She is not diaphoretic. No cyanosis. No pallor. Nails show no clubbing. Psychiatric: She has a normal mood and affect.  Her speech is normal and behavior is normal. Judgment normal.     No components found for: CHLPL  Lab Results   Component Value Date    TRIG 159 10/04/2017    TRIG 227 (H) 10/04/2016    TRIG 238 (H) 08/03/2015     Lab Results   Component Value Date    HDL 49 10/04/2017    HDL 52 10/04/2016    HDL 50 08/03/2015     Lab Results   Component Value Date    LDLCALC 67 10/04/2017    LDLCALC 93 10/04/2016    LDLCALC 124 08/03/2015     No results found for: LABVLDL  Lab Results   Component Value Date    ALT 20 10/04/2017    AST 21 10/04/2017    ALKPHOS 91 10/04/2017    BILITOT 0.6 10/04/2017       Chemistry        Component Value Date/Time     05/04/2017 0552    K 4.0 05/04/2017 0552     05/04/2017 0552    CO2 19 (L) 05/04/2017 0552    BUN 13 05/04/2017 0552    CREATININE 0.4 05/04/2017 0552        Component Value Date/Time    CALCIUM 9.3 05/04/2017 0552    ALKPHOS 91 10/04/2017 0732    AST 21 10/04/2017 0732    ALT 20 10/04/2017 0732    BILITOT 0.6 10/04/2017 0732            EKG: NSR, NO ST-T CHANGES,  Stress test nuc negative   ECHO ef 60%    EKG cont lopressor current dose    Coronary artery disease, seems to be stable. Denies angina or change in breathing pattern  Cont asa and brilinta  NO cp no sob   No angina equivalent    Palpitations- improved with  lopressor 50  po bid-   pat feel good  Palpitation getting better after cardizem cd      ABN LFT- stop lipitor- Repeat LFT- resolved  Tolerate crestor but expensive want to go back to lipitor  Cont  lipitor 20 mg po qd\  LFT repeat after lipitor is good  Lipid panel and liver function test before next appointment    GERD got better after nexium  Off  nexium 20    Hyperlipidemia: on statins, followed periodically. Patient need periodic lipid and liver profile. Hypertension, on medical treatment. Seems to be under reasonable control control. Patient is compliant with medical treatment.  cont lisinopril to 20 mg po qd  Home BP measurement   sbp in the 140   And sinus tachy and palpitation in her ears- resolved after lopressor-  Could now tolerate high dose BB  Cont  Cardizem  po qd    Time consuming lengthy discussion and this type of discussion happens all time during her visit  Time 40 min    F/U 2 weeks    Kevin Schroeder MD, [facility] [place]

## 2017-10-30 ENCOUNTER — HOSPITAL ENCOUNTER (OUTPATIENT)
Dept: NON INVASIVE DIAGNOSTICS | Age: 77
Discharge: HOME | End: 2017-10-30
Payer: MEDICARE

## 2017-10-30 VITALS — HEIGHT: 60 IN | BODY MASS INDEX: 29.84 KG/M2 | WEIGHT: 152 LBS

## 2017-10-30 DIAGNOSIS — E78.00 PURE HYPERCHOLESTEROLEMIA: ICD-10-CM

## 2017-10-30 DIAGNOSIS — I25.10 CORONARY ARTERY DISEASE INVOLVING NATIVE CORONARY ARTERY OF NATIVE HEART WITHOUT ANGINA PECTORIS: ICD-10-CM

## 2017-10-30 DIAGNOSIS — Z95.820 S/P ANGIOPLASTY WITH STENT: ICD-10-CM

## 2017-10-30 DIAGNOSIS — R00.2 HEART PALPITATIONS: ICD-10-CM

## 2017-10-30 DIAGNOSIS — R06.02 SOB (SHORTNESS OF BREATH) ON EXERTION: ICD-10-CM

## 2017-10-30 DIAGNOSIS — I10 ESSENTIAL HYPERTENSION: ICD-10-CM

## 2017-10-30 DIAGNOSIS — Z98.890 S/P CARDIAC CATH: ICD-10-CM

## 2017-10-30 PROCEDURE — 3430000000 HC RX DIAGNOSTIC RADIOPHARMACEUTICAL: Performed by: INTERNAL MEDICINE

## 2017-10-30 PROCEDURE — A9500 TC99M SESTAMIBI: HCPCS | Performed by: INTERNAL MEDICINE

## 2017-10-30 PROCEDURE — 93017 CV STRESS TEST TRACING ONLY: CPT

## 2017-10-30 PROCEDURE — 78452 HT MUSCLE IMAGE SPECT MULT: CPT

## 2017-10-30 PROCEDURE — 6360000002 HC RX W HCPCS

## 2017-10-30 RX ADMIN — Medication 9.7 MILLICURIE: at 08:35

## 2017-10-30 RX ADMIN — Medication 35 MILLICURIE: at 09:29

## 2017-10-31 ENCOUNTER — OFFICE VISIT (OUTPATIENT)
Dept: CARDIOLOGY CLINIC | Age: 77
End: 2017-10-31
Payer: MEDICARE

## 2017-10-31 VITALS
HEART RATE: 56 BPM | DIASTOLIC BLOOD PRESSURE: 69 MMHG | HEIGHT: 60 IN | BODY MASS INDEX: 30.94 KG/M2 | SYSTOLIC BLOOD PRESSURE: 153 MMHG | WEIGHT: 157.6 LBS

## 2017-10-31 DIAGNOSIS — R00.2 HEART PALPITATIONS: Primary | ICD-10-CM

## 2017-10-31 DIAGNOSIS — R09.89 BILATERAL CAROTID BRUITS: ICD-10-CM

## 2017-10-31 DIAGNOSIS — I25.10 CORONARY ARTERY DISEASE INVOLVING NATIVE CORONARY ARTERY OF NATIVE HEART WITHOUT ANGINA PECTORIS: ICD-10-CM

## 2017-10-31 DIAGNOSIS — Z98.890 S/P CARDIAC CATH: ICD-10-CM

## 2017-10-31 DIAGNOSIS — E78.00 PURE HYPERCHOLESTEROLEMIA: ICD-10-CM

## 2017-10-31 PROCEDURE — 99213 OFFICE O/P EST LOW 20 MIN: CPT | Performed by: INTERNAL MEDICINE

## 2017-10-31 PROCEDURE — G8427 DOCREV CUR MEDS BY ELIG CLIN: HCPCS | Performed by: INTERNAL MEDICINE

## 2017-10-31 PROCEDURE — G8400 PT W/DXA NO RESULTS DOC: HCPCS | Performed by: INTERNAL MEDICINE

## 2017-10-31 PROCEDURE — G8598 ASA/ANTIPLAT THER USED: HCPCS | Performed by: INTERNAL MEDICINE

## 2017-10-31 PROCEDURE — G8417 CALC BMI ABV UP PARAM F/U: HCPCS | Performed by: INTERNAL MEDICINE

## 2017-10-31 PROCEDURE — 4040F PNEUMOC VAC/ADMIN/RCVD: CPT | Performed by: INTERNAL MEDICINE

## 2017-10-31 PROCEDURE — 1123F ACP DISCUSS/DSCN MKR DOCD: CPT | Performed by: INTERNAL MEDICINE

## 2017-10-31 PROCEDURE — 1090F PRES/ABSN URINE INCON ASSESS: CPT | Performed by: INTERNAL MEDICINE

## 2017-10-31 PROCEDURE — G8484 FLU IMMUNIZE NO ADMIN: HCPCS | Performed by: INTERNAL MEDICINE

## 2017-10-31 PROCEDURE — 1036F TOBACCO NON-USER: CPT | Performed by: INTERNAL MEDICINE

## 2017-10-31 NOTE — PROGRESS NOTES
Subjective:      Patient ID: Margareth Carl is a 68 y.o. female. HPI  Chief Complaint   Patient presents with    Check-Up     keerthi    Hypertension    Coronary Artery Disease     1yr f/u today- Has some \"dull pain in her Rt breast when she touches it\". NONtender and had Mamogram and was okay   The Chest discomfort on the RT siae last 2 sec once in a while   The chest discomfort is not related to exercise  Awaiting for Barium eval  Has dysphagia at times  Has gotten better in last couple months. She is not sure if it is heartburn. Patient here for a 2 week follow up - keerthi    Patient did not bring a medication list. Patient verbally stated nothing has changed. Patient denies cp, dizziness, swelling     Patient complains of sob    Still having palpitation in her ears- once in a while    Home /60 to 137/69 HR from 49 to 64  Pat complains of palpitation on her ear- happens only in the am when she wake up in the morning     No problems after getting up    Further interrogation revealed  Dian Daniels still mourning her husbands's death which happened 8 year back  The two daughter confirmed that and the whole family still  Mourning  On talking more pat was about to cry and got emotional and one her daughter left the room-she was answering her mom my Q  It is obvious pat is anxious and stressed ? ? Depression? ? And we discussed to consider antidepressant if nuc stress neg    Started on lopressor for HTN and palpit and could not tolerate - hence stop it. The pyrosis got better after nexium 40 mg po qd    Patient Seen, Chart, Consults notes, Labs, Radiology studies reviewed.     [address] [facility],[address]  Father had stent at age -63's    Patient Active Problem List   Diagnosis    Multinodular goiter    Hyperlipidemia    Hypertension    Abnormal LFTs- may 2014- resolved    GERD (gastroesophageal reflux disease)    Hepatic cyst-under F/u with GI    Heart palpitations in the ears only on waking up in the AM    Sinus tachycardia    S/P cardiac cath- 5/3/17- LM-P, LAD MID 99% STENOSIS , LCX OSTIAL 30% STENOSIS, RCA MILD LUMINAL IRREGULARITIES, EDP 4 , EF 60%, MODERATE SIZED ANTEROLATERAL ANEURYSM, - PCI OF THE MID LAD DONE    S/P angioplasty with stent OF THE MID LAD    Coronary artery disease involving native coronary artery of native heart without angina pectoris    Sinus bradycardia    SOB (shortness of breath) on exertion       Current Outpatient Prescriptions   Medication Sig Dispense Refill    metoprolol tartrate (LOPRESSOR) 25 MG tablet Take 1 tablet by mouth 2 times daily 60 tablet 3    ticagrelor (BRILINTA) 90 MG TABS tablet Take 1 tablet by mouth 2 times daily 180 tablet 1    diltiazem (CARDIZEM CD) 120 MG extended release capsule Take 1 capsule by mouth daily 90 capsule 1    atorvastatin (LIPITOR) 20 MG tablet Take 1 tablet by mouth daily 90 tablet 1    lisinopril (PRINIVIL;ZESTRIL) 10 MG tablet Take 1 tablet by mouth daily 90 tablet 2    nitroGLYCERIN (NITROSTAT) 0.4 MG SL tablet up to max of 3 total doses. If no relief after 1 dose, call 911. 25 tablet 3    aspirin 81 MG tablet Take 81 mg by mouth daily       Multiple Vitamins-Minerals (MULTIVITAMIN PO) Take 1 tablet by mouth daily.  Calcium Carbonate-Vitamin D (CALCIUM 500 + D PO) Take 1 tablet by mouth daily.  oxybutynin (DITROPAN) 5 MG tablet Take 5 mg by mouth as needed. No current facility-administered medications for this visit. Allergies   Allergen Reactions    Codeine Anxiety        Family History   Problem Relation Age of Onset    Arthritis Mother     Mental Illness Mother     Arthritis Father     High Blood Pressure Father     High Cholesterol Father     Stroke Father     Mental Illness Father         Social History     Social History    Marital status:      Spouse name: N/A    Number of children: N/A    Years of education: N/A     Occupational History    Not on file.      Social History Main Topics    Smoking status: Never Smoker    Smokeless tobacco: Never Used    Alcohol use No      Comment: 2 drinks per year     Drug use: No    Sexual activity: No     Other Topics Concern    Not on file     Social History Narrative    No narrative on file       Blood pressure (!) 153/69, pulse 56, height 5' (1.524 m), weight 157 lb 9.6 oz (71.5 kg), not currently breastfeeding. Review of Systems   Constitutional: Negative. HENT: Negative. Eyes: Negative. Respiratory: Negative. Cardiovascular: Negative. Gastrointestinal: Negative. Genitourinary: Negative. Musculoskeletal: Negative. Skin: Negative. Neurological: Negative. Hematological: Negative. Psychiatric/Behavioral: Negative. Objective:   Physical Exam   [nursing notereviewed. Constitutional: She is oriented to person, place, and time. Vital signs are normal. She appears well-developed and well-nourished. [unfilled]   HENT:   Head: Normocephalic and atraumatic. Right Ear: Hearing and external ear normal.   Left Ear: Hearing and external ear normal.   Nose: Nose normal.   Mouth/Throat: Oropharynx is clear and moist and mucous membranes are normal.   Eyes: Conjunctivae, EOM and lids are normal. Pupils are equal, round, and reactive to light. Right eye exhibits no discharge. Left eye exhibits no discharge. No scleral icterus. Neck: Normal range of motion. Neck supple. No JVD present. No muscular tenderness present. Carotid bruit is not present. No edema and no erythema present. No thyromegaly present. Cardiovascular: Normal rate, regular rhythm, S1 normal, S2 normal, normal heart sounds, intact distal pulses and normal pulses. No extrasystoles are present. PMI is not displaced. Exam reveals no gallop, no S3, no S4, no distant heart sounds and no friction rub. No murmur heard. Pulmonary/Chest: Effort normal and breath sounds normal. No stridor. No respiratory distress. She has no wheezes. She has no rales.  Chest

## 2017-11-01 ENCOUNTER — HOSPITAL ENCOUNTER (OUTPATIENT)
Dept: INTERVENTIONAL RADIOLOGY/VASCULAR | Age: 77
Discharge: HOME | End: 2017-11-01
Payer: MEDICARE

## 2017-11-01 DIAGNOSIS — R09.89 BILATERAL CAROTID BRUITS: ICD-10-CM

## 2017-11-01 PROCEDURE — 93880 EXTRACRANIAL BILAT STUDY: CPT

## 2017-11-02 ENCOUNTER — TELEPHONE (OUTPATIENT)
Dept: CARDIOLOGY CLINIC | Age: 77
End: 2017-11-02

## 2017-11-06 ENCOUNTER — APPOINTMENT (OUTPATIENT)
Dept: CARDIAC REHAB | Age: 77
End: 2017-11-06
Payer: MEDICARE

## 2017-12-11 ENCOUNTER — OFFICE VISIT (OUTPATIENT)
Dept: ENDOCRINOLOGY | Age: 77
End: 2017-12-11
Payer: MEDICARE

## 2017-12-11 ENCOUNTER — HOSPITAL ENCOUNTER (OUTPATIENT)
Age: 77
Discharge: HOME | End: 2017-12-11
Payer: MEDICARE

## 2017-12-11 VITALS
RESPIRATION RATE: 16 BRPM | DIASTOLIC BLOOD PRESSURE: 60 MMHG | HEIGHT: 60 IN | HEART RATE: 53 BPM | WEIGHT: 161 LBS | SYSTOLIC BLOOD PRESSURE: 131 MMHG | BODY MASS INDEX: 31.61 KG/M2

## 2017-12-11 DIAGNOSIS — R94.6 ABNORMAL THYROID FUNCTION TEST: ICD-10-CM

## 2017-12-11 DIAGNOSIS — E04.2 MULTINODULAR GOITER: Primary | ICD-10-CM

## 2017-12-11 LAB
T3 FREE: 3.1 PG/ML (ref 2.02–4.43)
T4 FREE: 1.26 NG/DL (ref 0.93–1.76)
TSH SERPL DL<=0.05 MIU/L-ACNC: 0.75 UIU/ML (ref 0.4–4.2)

## 2017-12-11 PROCEDURE — 84439 ASSAY OF FREE THYROXINE: CPT

## 2017-12-11 PROCEDURE — 84481 FREE ASSAY (FT-3): CPT

## 2017-12-11 PROCEDURE — 1090F PRES/ABSN URINE INCON ASSESS: CPT | Performed by: INTERNAL MEDICINE

## 2017-12-11 PROCEDURE — G8417 CALC BMI ABV UP PARAM F/U: HCPCS | Performed by: INTERNAL MEDICINE

## 2017-12-11 PROCEDURE — 1123F ACP DISCUSS/DSCN MKR DOCD: CPT | Performed by: INTERNAL MEDICINE

## 2017-12-11 PROCEDURE — 84443 ASSAY THYROID STIM HORMONE: CPT

## 2017-12-11 PROCEDURE — G8598 ASA/ANTIPLAT THER USED: HCPCS | Performed by: INTERNAL MEDICINE

## 2017-12-11 PROCEDURE — 1036F TOBACCO NON-USER: CPT | Performed by: INTERNAL MEDICINE

## 2017-12-11 PROCEDURE — G8484 FLU IMMUNIZE NO ADMIN: HCPCS | Performed by: INTERNAL MEDICINE

## 2017-12-11 PROCEDURE — 4040F PNEUMOC VAC/ADMIN/RCVD: CPT | Performed by: INTERNAL MEDICINE

## 2017-12-11 PROCEDURE — G8427 DOCREV CUR MEDS BY ELIG CLIN: HCPCS | Performed by: INTERNAL MEDICINE

## 2017-12-11 PROCEDURE — 99213 OFFICE O/P EST LOW 20 MIN: CPT | Performed by: INTERNAL MEDICINE

## 2017-12-11 PROCEDURE — G8400 PT W/DXA NO RESULTS DOC: HCPCS | Performed by: INTERNAL MEDICINE

## 2017-12-11 PROCEDURE — 36415 COLL VENOUS BLD VENIPUNCTURE: CPT

## 2017-12-11 NOTE — LETTER
Endocrine Diabetes Metabolism [place] [address] [facility].  [address] [place] [address]  Phone: [phone]  Fax: [phone]    Dalton Merino MD      12/18/17    Dr. Glendy Cuellar    Patient: Delia Sethi  MR Number: [id number]  YOB: 1940  Date of Visit: 12/11/2017      Dear Dr. Glendy Cuellar    Thank you for the request for consultation for Delia Sethi to me for the evaluation of   Chief Complaint   Patient presents with    Other     Multinodular Goiter    Other     Abnormal Thyroid Function Test    Results     6/14/17- labs completed    Results     US on 9/28/17   . Below are the relevant portions of my assessment and plan of care. Subjective:     66-year-old female being followed for a multinodular goiter that has been there for 6 years. We have previously biopsied thyroid gland with benign pathology report. The patient denies pain, choking and hoarseness of the voice. In June her TSH was suppressed at 0.22 without any evidence of thyrotoxicosis. This has been repeated and it has normalized.   Past Medical History:   Diagnosis Date    Coronary artery disease involving native coronary artery of native heart without angina pectoris 5/5/2017    Gall stones     gall bladder removed in the 1970s    GERD (gastroesophageal reflux disease)     Hyperlipidemia     Hypertension     Osteoporosis       Past Surgical History:   Procedure Laterality Date    CHOLECYSTECTOMY  1990    EYE SURGERY  2010    KNEE ARTHROSCOPY  2010, 2011    KNEE SURGERY Left April 2015    OTHER SURGICAL HISTORY  5/2012    Patient had skin removed from eye lid    TONSILLECTOMY  1958       Family History   Problem Relation Age of Onset    Arthritis Mother     Mental Illness Mother     Arthritis Father     High Blood Pressure Father     High Cholesterol Father     Stroke Father     Mental Illness Father      Social History   Substance Use Topics    Smoking status: Never Smoker [facility] 67 10/04/2017    LDLCALC 93 10/04/2016    LDLCALC 124 08/03/2015     No results found for: LABVLDL, VLDL  No results found for: CHOLHDLRATIO      Review of Systems  Constitutional: negative for chills, fatigue and fevers  Eyes: negative for irritation, redness and visual disturbance  Respiratory: negative for cough, shortness of breath and wheezing  Cardiovascular: negative for chest pain, irregular heart beat and palpitations    The remainder of systems were reviewed and negative. Objective:   /60   Pulse 53   Resp 16   Ht 5' (1.524 m)   Wt 161 lb (73 kg)   BMI 31.44 kg/m²      General:  alert, appears stated age, cooperative and no distress   Oropharynx: normal findings: lips normal without lesions, buccal mucosa normal, gums healthy and tongue midline and normal    Eyes:  negative findings: lids and lashes normal, conjunctivae and sclerae normal, corneas clear and pupils equal, round, reactive to light and accomodation       Neck: no adenopathy, no carotid bruit, no JVD and supple, symmetrical, trachea midline   Thyroid:  Nodular with no tenderness. Lung: clear to auscultation bilaterally   Heart:  regular rate and rhythm, S1, S2 normal, no murmur, click, rub or gallop and normal apical impulse   Abdomen: soft, non-tender; bowel sounds normal; no masses,  no organomegaly   Extremities: extremities normal, atraumatic, no cyanosis or edema and Homans sign is negative, no sign of DVT   Pulses: 2+ and symmetric   Skin: warm and dry, no hyperpigmentation, vitiligo, or suspicious lesions   Neuro: normal without focal findings, mental status, speech normal, alert and oriented x3, ALONSO, cranial nerves 2-12 intact, muscle tone and strength normal and symmetric. Assessment/Plan:     1. Multinodular goiter: Previous biopsy was reassuring. Recent ultrasound shows no changes. Patient remains asymptomatic and will continue to monitor for growth. 2. Abnormal thyroid function test: Has resolved but needs to be monitored for any further changes. Orders Placed This Encounter   Procedures    US Head Neck Soft Tissue Thyroid     Standing Status:   Future     Standing Expiration Date:   12/12/2018    TSH without Reflex     Standing Status:   Future     Standing Expiration Date:   12/11/2018    T4, Free     Standing Status:   Future     Standing Expiration Date:   12/11/2018    T3, Free     Standing Status:   Future     Standing Expiration Date:   12/11/2018       · The risks and benefits of my recommendations, as well as other treatment options were discussed with the patient today. Questions were answered. · Follow up: 6 months and as needed. If you have questions, please do not hesitate to call me. I look forward to following Britton along with you.     Sincerely,        Gill Waggoner MD

## 2017-12-18 NOTE — PROGRESS NOTES
times daily 60 tablet 3    ticagrelor (BRILINTA) 90 MG TABS tablet Take 1 tablet by mouth 2 times daily 180 tablet 1    diltiazem (CARDIZEM CD) 120 MG extended release capsule Take 1 capsule by mouth daily 90 capsule 1    atorvastatin (LIPITOR) 20 MG tablet Take 1 tablet by mouth daily 90 tablet 1    lisinopril (PRINIVIL;ZESTRIL) 10 MG tablet Take 1 tablet by mouth daily 90 tablet 2    nitroGLYCERIN (NITROSTAT) 0.4 MG SL tablet up to max of 3 total doses. If no relief after 1 dose, call 911. 25 tablet 3    aspirin 81 MG tablet Take 81 mg by mouth daily       Multiple Vitamins-Minerals (MULTIVITAMIN PO) Take 1 tablet by mouth daily.  Calcium Carbonate-Vitamin D (CALCIUM 500 + D PO) Take 1 tablet by mouth daily.  oxybutynin (DITROPAN) 5 MG tablet Take 5 mg by mouth as needed. No current facility-administered medications for this visit.       Allergies   Allergen Reactions    Codeine Anxiety     Health Maintenance   Topic Date Due    DTaP/Tdap/Td vaccine (1 - Tdap) 09[birth year]18/1959    DEXA (modify frequency per FRAX score)  09/18/2005    Pneumococcal low/med risk (1 of 2 - PCV13) 09/18/2005    Flu vaccine (1) 09/01/2017    Lipid screen  10/04/2022    Zostavax vaccine  Completed       Labs  No results found for: LABA1C  No results found for: EAG  Lab Results   Component Value Date    TSH 0.748 12/11/2017     Lab Results   Component Value Date    CHOL 148 10/04/2017    CHOL 190 10/04/2016    CHOL 222 (H) 08/03/2015     Lab Results   Component Value Date    TRIG 159 10/04/2017    TRIG 227 (H) 10/04/2016    TRIG 238 (H) 08/03/2015     Lab Results   Component Value Date    HDL 49 10/04/2017    HDL 52 10/04/2016    HDL 50 08/03/2015     Lab Results   Component Value Date    LDLCALC 67 10/04/2017    [address] 93 10/04/2016    LDLCALC 124 08/03/2015     No results found for: LABVLDL, VLDL  No results found for: CHOLHDLRATIO      Review of Systems  Constitutional: negative for chills, fatigue and without Reflex     Standing Status:   Future     Standing Expiration Date:   12/11/2018    T4, Free     Standing Status:   Future     Standing Expiration Date:   12/11/2018    T3, Free     Standing Status:   Future     Standing Expiration Date:   12/11/2018       · The risks and benefits of my recommendations, as well as other treatment options were discussed with the patient today. Questions were answered. · Follow up: 6 months and as needed. Electronically signed by Inocencio Carrasquillo MD on 12/1/17 at 1:38 AM    **This report has been created using voice recognition software. It may   contain minor errors which are inherent in voice recognition technology. **

## 2017-12-29 ENCOUNTER — TELEPHONE (OUTPATIENT)
Dept: CARDIOLOGY CLINIC | Age: 77
End: 2017-12-29

## 2017-12-29 NOTE — TELEPHONE ENCOUNTER
Pre op Risk Assessment    Procedure TOOTH EXTRACTION  Physician [facility]  Date of surgery/procedure TBA    Last OV 10-  Last Stress 10-  Last Echo 4-  Last Cath 5-3-2017  Last Stent 5-3-2017  Is patient on blood thinners ASA AND BRILINTA  Hold Meds/how many days ? ?    CAN PATIENT BE CLEARED?

## 2018-01-10 ENCOUNTER — TELEPHONE (OUTPATIENT)
Dept: CARDIOLOGY CLINIC | Age: 78
End: 2018-01-10

## 2018-01-24 RX ORDER — DILTIAZEM HYDROCHLORIDE 120 MG/1
120 CAPSULE, EXTENDED RELEASE ORAL DAILY
Qty: 90 CAPSULE | Refills: 0 | Status: SHIPPED | OUTPATIENT
Start: 2018-01-24 | End: 2018-06-11 | Stop reason: SDUPTHER

## 2018-02-01 ENCOUNTER — OFFICE VISIT (OUTPATIENT)
Dept: CARDIOLOGY CLINIC | Age: 78
End: 2018-02-01
Payer: MEDICARE

## 2018-02-01 VITALS
SYSTOLIC BLOOD PRESSURE: 161 MMHG | WEIGHT: 160.2 LBS | DIASTOLIC BLOOD PRESSURE: 69 MMHG | BODY MASS INDEX: 31.45 KG/M2 | HEIGHT: 60 IN | HEART RATE: 59 BPM

## 2018-02-01 DIAGNOSIS — E78.00 PURE HYPERCHOLESTEROLEMIA: ICD-10-CM

## 2018-02-01 DIAGNOSIS — I25.10 CORONARY ARTERY DISEASE INVOLVING NATIVE CORONARY ARTERY OF NATIVE HEART WITHOUT ANGINA PECTORIS: Primary | ICD-10-CM

## 2018-02-01 DIAGNOSIS — I10 ESSENTIAL HYPERTENSION: ICD-10-CM

## 2018-02-01 DIAGNOSIS — Z98.890 S/P CARDIAC CATH: ICD-10-CM

## 2018-02-01 DIAGNOSIS — Z95.820 S/P ANGIOPLASTY WITH STENT: ICD-10-CM

## 2018-02-01 PROCEDURE — G8484 FLU IMMUNIZE NO ADMIN: HCPCS | Performed by: INTERNAL MEDICINE

## 2018-02-01 PROCEDURE — 1036F TOBACCO NON-USER: CPT | Performed by: INTERNAL MEDICINE

## 2018-02-01 PROCEDURE — G8427 DOCREV CUR MEDS BY ELIG CLIN: HCPCS | Performed by: INTERNAL MEDICINE

## 2018-02-01 PROCEDURE — 4040F PNEUMOC VAC/ADMIN/RCVD: CPT | Performed by: INTERNAL MEDICINE

## 2018-02-01 PROCEDURE — G8417 CALC BMI ABV UP PARAM F/U: HCPCS | Performed by: INTERNAL MEDICINE

## 2018-02-01 PROCEDURE — G8400 PT W/DXA NO RESULTS DOC: HCPCS | Performed by: INTERNAL MEDICINE

## 2018-02-01 PROCEDURE — 99214 OFFICE O/P EST MOD 30 MIN: CPT | Performed by: INTERNAL MEDICINE

## 2018-02-01 PROCEDURE — 1090F PRES/ABSN URINE INCON ASSESS: CPT | Performed by: INTERNAL MEDICINE

## 2018-02-01 PROCEDURE — 1123F ACP DISCUSS/DSCN MKR DOCD: CPT | Performed by: INTERNAL MEDICINE

## 2018-02-01 PROCEDURE — G8598 ASA/ANTIPLAT THER USED: HCPCS | Performed by: INTERNAL MEDICINE

## 2018-02-01 NOTE — PROGRESS NOTES
RCA MILD LUMINAL IRREGULARITIES, EDP 4 , EF 60%, MODERATE SIZED ANTEROLATERAL ANEURYSM, - PCI OF THE MID LAD DONE    S/P angioplasty with stent OF THE MID LAD    Coronary artery disease involving native coronary artery of native heart without angina pectoris    Sinus bradycardia    SOB (shortness of breath) on exertion       Current Outpatient Prescriptions   Medication Sig Dispense Refill    CARTIA  MG extended release capsule TAKE 1 CAPSULE BY MOUTH  DAILY 90 capsule 0    metoprolol tartrate (LOPRESSOR) 25 MG tablet Take 1 tablet by mouth 2 times daily 60 tablet 3    ticagrelor (BRILINTA) 90 MG TABS tablet Take 1 tablet by mouth 2 times daily 180 tablet 1    atorvastatin (LIPITOR) 20 MG tablet Take 1 tablet by mouth daily 90 tablet 1    lisinopril (PRINIVIL;ZESTRIL) 10 MG tablet Take 1 tablet by mouth daily 90 tablet 2    nitroGLYCERIN (NITROSTAT) 0.4 MG SL tablet up to max of 3 total doses. If no relief after 1 dose, call 911. 25 tablet 3    aspirin 81 MG tablet Take 81 mg by mouth daily       Multiple Vitamins-Minerals (MULTIVITAMIN PO) Take 1 tablet by mouth daily.  Calcium Carbonate-Vitamin D (CALCIUM 500 + D PO) Take 1 tablet by mouth daily.  oxybutynin (DITROPAN) 5 MG tablet Take 5 mg by mouth as needed. No current facility-administered medications for this visit. Allergies   Allergen Reactions    Codeine Anxiety        Family History   Problem Relation Age of Onset    Arthritis Mother     Mental Illness Mother     Arthritis Father     High Blood Pressure Father     High Cholesterol Father     Stroke Father     Mental Illness Father         Social History     Social History    Marital status:      Spouse name: N/A    Number of children: N/A    Years of education: N/A     Occupational History    Not on file.      Social History Main Topics    Smoking status: Never Smoker    Smokeless tobacco: Never Used    Alcohol use No      Comment: 2 drinks per periodically. Patient need periodic lipid and liver profile. Hypertension, on medical treatment. Seems to be under reasonable control control. Patient is compliant with medical treatment.  cont lisinopril to 20 mg po qd  Home BP measurement  sbp in the 120  HR  And sinus tachy and palpitation in her ears- resolved after lopressor-  Could now tolerate high dose BB  Cont  Cardizem  po qd  Cont lopressor 25 bid  Time consuming lengthy discussion and this type of discussion happens all time during her visit    F/U 5 months    Thurman Goltz, MD, [facility] - [place]

## 2018-02-11 ENCOUNTER — NURSE TRIAGE (OUTPATIENT)
Dept: ADMINISTRATIVE | Age: 78
End: 2018-02-11

## 2018-04-11 ENCOUNTER — HOSPITAL ENCOUNTER (OUTPATIENT)
Dept: ULTRASOUND IMAGING | Age: 78
Discharge: HOME | End: 2018-04-11
Payer: MEDICARE

## 2018-04-11 DIAGNOSIS — E04.2 MULTINODULAR GOITER: ICD-10-CM

## 2018-04-11 PROCEDURE — 76536 US EXAM OF HEAD AND NECK: CPT

## 2018-04-16 DIAGNOSIS — E04.1 THYROID NODULE: Primary | ICD-10-CM

## 2018-04-17 ENCOUNTER — TELEPHONE (OUTPATIENT)
Dept: ENDOCRINOLOGY | Age: 78
End: 2018-04-17

## 2018-04-17 DIAGNOSIS — E04.2 MULTINODULAR GOITER: Primary | ICD-10-CM

## 2018-04-20 ENCOUNTER — TELEPHONE (OUTPATIENT)
Dept: CARDIOLOGY CLINIC | Age: 78
End: 2018-04-20

## 2018-04-24 ENCOUNTER — TELEPHONE (OUTPATIENT)
Dept: INTERNAL MEDICINE CLINIC | Age: 78
End: 2018-04-24

## 2018-05-02 ENCOUNTER — HOSPITAL ENCOUNTER (OUTPATIENT)
Dept: ULTRASOUND IMAGING | Age: 78
Discharge: HOME | End: 2018-05-02
Payer: MEDICARE

## 2018-05-02 DIAGNOSIS — E04.2 MULTINODULAR GOITER: ICD-10-CM

## 2018-05-02 PROCEDURE — 76536 US EXAM OF HEAD AND NECK: CPT

## 2018-06-11 ENCOUNTER — OFFICE VISIT (OUTPATIENT)
Dept: INTERNAL MEDICINE CLINIC | Age: 78
End: 2018-06-11
Payer: MEDICARE

## 2018-06-11 VITALS
BODY MASS INDEX: 32.89 KG/M2 | WEIGHT: 167.5 LBS | DIASTOLIC BLOOD PRESSURE: 70 MMHG | RESPIRATION RATE: 16 BRPM | HEART RATE: 61 BPM | SYSTOLIC BLOOD PRESSURE: 128 MMHG | HEIGHT: 60 IN

## 2018-06-11 DIAGNOSIS — E04.2 MULTINODULAR GOITER: Primary | ICD-10-CM

## 2018-06-11 PROCEDURE — G8417 CALC BMI ABV UP PARAM F/U: HCPCS | Performed by: CLINICAL NURSE SPECIALIST

## 2018-06-11 PROCEDURE — 3288F FALL RISK ASSESSMENT DOCD: CPT | Performed by: CLINICAL NURSE SPECIALIST

## 2018-06-11 PROCEDURE — 1123F ACP DISCUSS/DSCN MKR DOCD: CPT | Performed by: CLINICAL NURSE SPECIALIST

## 2018-06-11 PROCEDURE — G8598 ASA/ANTIPLAT THER USED: HCPCS | Performed by: CLINICAL NURSE SPECIALIST

## 2018-06-11 PROCEDURE — G8510 SCR DEP NEG, NO PLAN REQD: HCPCS | Performed by: CLINICAL NURSE SPECIALIST

## 2018-06-11 PROCEDURE — 99213 OFFICE O/P EST LOW 20 MIN: CPT | Performed by: CLINICAL NURSE SPECIALIST

## 2018-06-11 PROCEDURE — 4040F PNEUMOC VAC/ADMIN/RCVD: CPT | Performed by: CLINICAL NURSE SPECIALIST

## 2018-06-11 PROCEDURE — 1036F TOBACCO NON-USER: CPT | Performed by: CLINICAL NURSE SPECIALIST

## 2018-06-11 PROCEDURE — G8400 PT W/DXA NO RESULTS DOC: HCPCS | Performed by: CLINICAL NURSE SPECIALIST

## 2018-06-11 PROCEDURE — 1090F PRES/ABSN URINE INCON ASSESS: CPT | Performed by: CLINICAL NURSE SPECIALIST

## 2018-06-11 PROCEDURE — G8427 DOCREV CUR MEDS BY ELIG CLIN: HCPCS | Performed by: CLINICAL NURSE SPECIALIST

## 2018-06-11 RX ORDER — DILTIAZEM HYDROCHLORIDE 120 MG/1
120 CAPSULE, EXTENDED RELEASE ORAL DAILY
Qty: 90 CAPSULE | Refills: 0 | Status: SHIPPED | OUTPATIENT
Start: 2018-06-11 | End: 2018-07-12 | Stop reason: SDUPTHER

## 2018-06-11 ASSESSMENT — ENCOUNTER SYMPTOMS
SHORTNESS OF BREATH: 0
COUGH: 0
CHEST TIGHTNESS: 0
CONSTIPATION: 0
ABDOMINAL PAIN: 0
VOICE CHANGE: 0
DIARRHEA: 0
NAUSEA: 0
WHEEZING: 0
TROUBLE SWALLOWING: 1
EYE REDNESS: 0

## 2018-06-11 ASSESSMENT — PATIENT HEALTH QUESTIONNAIRE - PHQ9
2. FEELING DOWN, DEPRESSED OR HOPELESS: 0
SUM OF ALL RESPONSES TO PHQ QUESTIONS 1-9: 0
1. LITTLE INTEREST OR PLEASURE IN DOING THINGS: 0
SUM OF ALL RESPONSES TO PHQ9 QUESTIONS 1 & 2: 0

## 2018-06-27 ENCOUNTER — HOSPITAL ENCOUNTER (OUTPATIENT)
Age: 78
Discharge: HOME | End: 2018-06-27
Payer: MEDICARE

## 2018-06-27 DIAGNOSIS — I25.10 CORONARY ARTERY DISEASE INVOLVING NATIVE CORONARY ARTERY OF NATIVE HEART WITHOUT ANGINA PECTORIS: ICD-10-CM

## 2018-06-27 DIAGNOSIS — Z95.820 S/P ANGIOPLASTY WITH STENT: ICD-10-CM

## 2018-06-27 DIAGNOSIS — I10 ESSENTIAL HYPERTENSION: ICD-10-CM

## 2018-06-27 DIAGNOSIS — E78.00 PURE HYPERCHOLESTEROLEMIA: ICD-10-CM

## 2018-06-27 DIAGNOSIS — Z98.890 S/P CARDIAC CATH: ICD-10-CM

## 2018-06-27 LAB
ALBUMIN SERPL-MCNC: 4.2 G/DL (ref 3.5–5.1)
ALP BLD-CCNC: 78 U/L (ref 38–126)
ALT SERPL-CCNC: 14 U/L (ref 11–66)
AST SERPL-CCNC: 20 U/L (ref 5–40)
BILIRUB SERPL-MCNC: 0.4 MG/DL (ref 0.3–1.2)
BILIRUBIN DIRECT: < 0.2 MG/DL (ref 0–0.3)
CHOLESTEROL, TOTAL: 148 MG/DL (ref 100–199)
HDLC SERPL-MCNC: 51 MG/DL
LDL CHOLESTEROL CALCULATED: 73 MG/DL
TOTAL PROTEIN: 6.9 G/DL (ref 6.1–8)
TRIGL SERPL-MCNC: 120 MG/DL (ref 0–199)

## 2018-06-27 PROCEDURE — 36415 COLL VENOUS BLD VENIPUNCTURE: CPT

## 2018-06-27 PROCEDURE — 80061 LIPID PANEL: CPT

## 2018-06-27 PROCEDURE — 80076 HEPATIC FUNCTION PANEL: CPT

## 2018-07-12 ENCOUNTER — OFFICE VISIT (OUTPATIENT)
Dept: CARDIOLOGY CLINIC | Age: 78
End: 2018-07-12
Payer: MEDICARE

## 2018-07-12 VITALS
DIASTOLIC BLOOD PRESSURE: 70 MMHG | BODY MASS INDEX: 31.69 KG/M2 | HEIGHT: 60 IN | HEART RATE: 52 BPM | SYSTOLIC BLOOD PRESSURE: 142 MMHG | WEIGHT: 161.4 LBS

## 2018-07-12 DIAGNOSIS — E78.00 PURE HYPERCHOLESTEROLEMIA: ICD-10-CM

## 2018-07-12 DIAGNOSIS — Z95.820 S/P ANGIOPLASTY WITH STENT: ICD-10-CM

## 2018-07-12 DIAGNOSIS — R06.02 SOB (SHORTNESS OF BREATH) ON EXERTION: ICD-10-CM

## 2018-07-12 DIAGNOSIS — Z98.890 S/P CARDIAC CATH: ICD-10-CM

## 2018-07-12 DIAGNOSIS — I10 ESSENTIAL HYPERTENSION: ICD-10-CM

## 2018-07-12 DIAGNOSIS — I25.10 CORONARY ARTERY DISEASE INVOLVING NATIVE CORONARY ARTERY OF NATIVE HEART WITHOUT ANGINA PECTORIS: Primary | ICD-10-CM

## 2018-07-12 PROCEDURE — 1101F PT FALLS ASSESS-DOCD LE1/YR: CPT | Performed by: INTERNAL MEDICINE

## 2018-07-12 PROCEDURE — 1090F PRES/ABSN URINE INCON ASSESS: CPT | Performed by: INTERNAL MEDICINE

## 2018-07-12 PROCEDURE — 99213 OFFICE O/P EST LOW 20 MIN: CPT | Performed by: INTERNAL MEDICINE

## 2018-07-12 PROCEDURE — 4040F PNEUMOC VAC/ADMIN/RCVD: CPT | Performed by: INTERNAL MEDICINE

## 2018-07-12 PROCEDURE — G8417 CALC BMI ABV UP PARAM F/U: HCPCS | Performed by: INTERNAL MEDICINE

## 2018-07-12 PROCEDURE — 1036F TOBACCO NON-USER: CPT | Performed by: INTERNAL MEDICINE

## 2018-07-12 PROCEDURE — 93000 ELECTROCARDIOGRAM COMPLETE: CPT | Performed by: INTERNAL MEDICINE

## 2018-07-12 PROCEDURE — G8598 ASA/ANTIPLAT THER USED: HCPCS | Performed by: INTERNAL MEDICINE

## 2018-07-12 PROCEDURE — G8428 CUR MEDS NOT DOCUMENT: HCPCS | Performed by: INTERNAL MEDICINE

## 2018-07-12 PROCEDURE — 1123F ACP DISCUSS/DSCN MKR DOCD: CPT | Performed by: INTERNAL MEDICINE

## 2018-07-12 PROCEDURE — G8400 PT W/DXA NO RESULTS DOC: HCPCS | Performed by: INTERNAL MEDICINE

## 2018-07-12 RX ORDER — DILTIAZEM HYDROCHLORIDE 120 MG/1
120 CAPSULE, COATED, EXTENDED RELEASE ORAL DAILY
Qty: 90 CAPSULE | Refills: 0 | Status: SHIPPED | OUTPATIENT
Start: 2018-07-12 | End: 2018-11-01 | Stop reason: SDUPTHER

## 2018-07-12 NOTE — PROGRESS NOTES
Negative. Hematological: Negative. Psychiatric/Behavioral: Negative. Objective:   Physical Exam   [nursing notereviewed. Constitutional: She is oriented to person, place, and time. Vital signs are normal. She appears well-developed and well-nourished. [unfilled]   HENT:   Head: Normocephalic and atraumatic. Right Ear: Hearing and external ear normal.   Left Ear: Hearing and external ear normal.   Nose: Nose normal.   Mouth/Throat: Oropharynx is clear and moist and mucous membranes are normal.   Eyes: Conjunctivae, EOM and lids are normal. Pupils are equal, round, and reactive to light. Right eye exhibits no discharge. Left eye exhibits no discharge. No scleral icterus. Neck: Normal range of motion. Neck supple. No JVD present. No muscular tenderness present. Carotid bruit is not present. No edema and no erythema present. No thyromegaly present. Cardiovascular: Normal rate, regular rhythm, S1 normal, S2 normal, normal heart sounds, intact distal pulses and normal pulses. No extrasystoles are present. PMI is not displaced. Exam reveals no gallop, no S3, no S4, no distant heart sounds and no friction rub. No murmur heard. Pulmonary/Chest: Effort normal and breath sounds normal. No stridor. No respiratory distress. She has no wheezes. She has no rales. Chest wall is not dull to percussion. She exhibits no mass, no tenderness, no bony tenderness, no crepitus, no edema, no deformity and no swelling. Abdominal: Soft. Normal appearance and bowel sounds are normal. There is no hepatosplenomegaly. No tenderness. She has no CVA tenderness. Musculoskeletal: Normal range of motion. She exhibits no edema and no tenderness. Lymphadenopathy:     She has no cervical adenopathy. She has no axillary adenopathy. Right: No inguinal adenopathy present. Left: No inguinal adenopathy present. Neurological: She is alert and oriented to person, place, and time.  No cranial nerve deficit or sensory deficit. She exhibits normal muscle tone. Coordination and gait normal.   Skin: Skin is warm and dry. No abrasion, no bruising and no rash noted. She is not diaphoretic. No cyanosis. No pallor. Nails show no clubbing. Psychiatric: She has a normal mood and affect. Her speech is normal and behavior is normal. Judgment normal.     No components found for: CHLPL  Lab Results   Component Value Date    TRIG 120 06/27/2018    TRIG 159 10/04/2017    TRIG 227 (H) 10/04/2016     Lab Results   Component Value Date    HDL 51 06/27/2018    HDL 49 10/04/2017    HDL 52 10/04/2016     Lab Results   Component Value Date    LDLCALC 73 06/27/2018    LDLCALC 67 10/04/2017    LDLCALC 93 10/04/2016     No results found for: LABVLDL  Lab Results   Component Value Date    ALT 14 06/27/2018    AST 20 06/27/2018    ALKPHOS 78 06/27/2018    BILITOT 0.4 06/27/2018       Chemistry        Component Value Date/Time     05/04/2017 0552    K 4.0 05/04/2017 0552     05/04/2017 0552    CO2 19 (L) 05/04/2017 0552    BUN 13 05/04/2017 0552    CREATININE 0.4 05/04/2017 0552        Component Value Date/Time    CALCIUM 9.3 05/04/2017 0552    ALKPHOS 78 06/27/2018 0811    AST 20 06/27/2018 0811    ALT 14 06/27/2018 0811    BILITOT 0.4 06/27/2018 0811            EKG: NSR, NO ST-T CHANGES,  Stress test nuc negative   ECHO ef 60%    EKG 07/18/13-Sinus  Rhythm WITHIN NORMAL LIMITS    EKG 5/1/14/-Sinus  Rhythm   WITHIN NORMAL LIMITS    Holter  CONCLUSION:   1. Predominantly sinus rhythm. 2. Rare PACs and PVCs. 3. No obvious sustained arrhythmias. Minimum heart rate was 47 beats per minute, maximum   heart rate was 151 beats per minute. Average heart rate was 75 beats per   minute  Maximo Lu M.D.   D: 05/15/2014 13:02 T: 05/15/2014 13:25 ks     ECHO EF 60% 2014      EKG 12/11/15  Sinus  Rhythm   WITHIN NORMAL LIMITS    Assessment:         Diagnosis Orders   1.  Coronary artery disease involving native coronary artery of happens all time during her visit    F/U 6 months    Miguel Bay MD, [facility] [place]

## 2018-07-25 RX ORDER — ATORVASTATIN CALCIUM 20 MG/1
20 TABLET, FILM COATED ORAL DAILY
Qty: 90 TABLET | Refills: 2 | Status: SHIPPED | OUTPATIENT
Start: 2018-07-25 | End: 2019-01-14 | Stop reason: SDUPTHER

## 2018-11-01 RX ORDER — DILTIAZEM HYDROCHLORIDE 120 MG/1
120 CAPSULE, COATED, EXTENDED RELEASE ORAL DAILY
Qty: 90 CAPSULE | Refills: 0 | Status: SHIPPED | OUTPATIENT
Start: 2018-11-01 | End: 2018-12-10 | Stop reason: SDUPTHER

## 2018-12-03 ENCOUNTER — HOSPITAL ENCOUNTER (OUTPATIENT)
Age: 78
Discharge: HOME | End: 2018-12-03
Payer: MEDICARE

## 2018-12-03 ENCOUNTER — HOSPITAL ENCOUNTER (OUTPATIENT)
Dept: ULTRASOUND IMAGING | Age: 78
Discharge: HOME | End: 2018-12-03
Payer: MEDICARE

## 2018-12-03 DIAGNOSIS — E04.2 MULTINODULAR GOITER: ICD-10-CM

## 2018-12-03 LAB
T3 FREE: 3.28 PG/ML (ref 2.02–4.43)
T4 FREE: 1.32 NG/DL (ref 0.93–1.76)
TSH SERPL DL<=0.05 MIU/L-ACNC: 0.66 UIU/ML (ref 0.4–4.2)

## 2018-12-03 PROCEDURE — 84443 ASSAY THYROID STIM HORMONE: CPT

## 2018-12-03 PROCEDURE — 36415 COLL VENOUS BLD VENIPUNCTURE: CPT

## 2018-12-03 PROCEDURE — 76536 US EXAM OF HEAD AND NECK: CPT

## 2018-12-03 PROCEDURE — 84439 ASSAY OF FREE THYROXINE: CPT

## 2018-12-03 PROCEDURE — 86376 MICROSOMAL ANTIBODY EACH: CPT

## 2018-12-03 PROCEDURE — 84481 FREE ASSAY (FT-3): CPT

## 2018-12-05 LAB — THYROID PEROXIDASE ANTIBODY: 0.4 IU/ML (ref 0–9)

## 2018-12-11 RX ORDER — DILTIAZEM HYDROCHLORIDE 120 MG/1
120 CAPSULE, COATED, EXTENDED RELEASE ORAL DAILY
Qty: 90 CAPSULE | Refills: 0 | Status: SHIPPED | OUTPATIENT
Start: 2018-12-11 | End: 2019-01-14 | Stop reason: SDUPTHER

## 2018-12-12 ENCOUNTER — OFFICE VISIT (OUTPATIENT)
Dept: INTERNAL MEDICINE CLINIC | Age: 78
End: 2018-12-12
Payer: MEDICARE

## 2018-12-12 VITALS
DIASTOLIC BLOOD PRESSURE: 70 MMHG | WEIGHT: 160 LBS | HEIGHT: 60 IN | SYSTOLIC BLOOD PRESSURE: 116 MMHG | OXYGEN SATURATION: 98 % | HEART RATE: 56 BPM | BODY MASS INDEX: 31.41 KG/M2

## 2018-12-12 DIAGNOSIS — E78.5 HYPERLIPIDEMIA, UNSPECIFIED HYPERLIPIDEMIA TYPE: ICD-10-CM

## 2018-12-12 DIAGNOSIS — E04.2 MULTINODULAR GOITER: Primary | ICD-10-CM

## 2018-12-12 DIAGNOSIS — Z95.820 S/P ANGIOPLASTY WITH STENT: ICD-10-CM

## 2018-12-12 DIAGNOSIS — I10 ESSENTIAL HYPERTENSION: ICD-10-CM

## 2018-12-12 DIAGNOSIS — I25.10 ASCVD (ARTERIOSCLEROTIC CARDIOVASCULAR DISEASE): ICD-10-CM

## 2018-12-12 PROCEDURE — G8417 CALC BMI ABV UP PARAM F/U: HCPCS | Performed by: INTERNAL MEDICINE

## 2018-12-12 PROCEDURE — 4040F PNEUMOC VAC/ADMIN/RCVD: CPT | Performed by: INTERNAL MEDICINE

## 2018-12-12 PROCEDURE — 1090F PRES/ABSN URINE INCON ASSESS: CPT | Performed by: INTERNAL MEDICINE

## 2018-12-12 PROCEDURE — 1101F PT FALLS ASSESS-DOCD LE1/YR: CPT | Performed by: INTERNAL MEDICINE

## 2018-12-12 PROCEDURE — 99204 OFFICE O/P NEW MOD 45 MIN: CPT | Performed by: INTERNAL MEDICINE

## 2018-12-12 PROCEDURE — G8598 ASA/ANTIPLAT THER USED: HCPCS | Performed by: INTERNAL MEDICINE

## 2018-12-12 PROCEDURE — G8427 DOCREV CUR MEDS BY ELIG CLIN: HCPCS | Performed by: INTERNAL MEDICINE

## 2018-12-12 PROCEDURE — G8400 PT W/DXA NO RESULTS DOC: HCPCS | Performed by: INTERNAL MEDICINE

## 2018-12-12 PROCEDURE — G8484 FLU IMMUNIZE NO ADMIN: HCPCS | Performed by: INTERNAL MEDICINE

## 2018-12-12 PROCEDURE — 1123F ACP DISCUSS/DSCN MKR DOCD: CPT | Performed by: INTERNAL MEDICINE

## 2018-12-12 PROCEDURE — 1036F TOBACCO NON-USER: CPT | Performed by: INTERNAL MEDICINE

## 2018-12-12 NOTE — PROGRESS NOTES
Surgical History:   Procedure Laterality Date    CHOLECYSTECTOMY  1990    EYE SURGERY  2010    KNEE ARTHROSCOPY  2010, 2011    KNEE SURGERY Left April 2015   [facility] OTHER SURGICAL HISTORY  5/2012    Patient had skin removed from eye lid    TONSILLECTOMY  1958       Allergies   Allergen Reactions    Codeine Anxiety       Social History     Social History    Marital status:      Spouse name: N/A    Number of children: N/A    Years of education: N/A     Occupational History    Not on file. Social History Main Topics    Smoking status: Never Smoker    Smokeless tobacco: Never Used    Alcohol use No      Comment: 2 drinks per year     Drug use: No    Sexual activity: No     Other Topics Concern    Not on file     Social History Narrative    No narrative on file   2 children  homemaker    Family History   Problem Relation Age of Onset    Arthritis Mother     Mental Illness Mother     Arthritis Father     High Blood Pressure Father     High Cholesterol Father     Stroke Father     Mental Illness Father        Review of Systems - General ROS: negative  Psychological ROS: negative  Hematological and Lymphatic ROS: negative  Respiratory ROS: no cough, shortness of breath, or wheezing  Cardiovascular ROS: no chest pain or dyspnea on exertion  Gastrointestinal ROS: no abdominal pain, change in bowel habits, or black or bloody stools  Genito-Urinary ROS: no dysuria, trouble voiding, or hematuria  Musculoskeletal ROS: negative  Neurological ROS: no TIA or stroke symptoms  Dermatological ROS: negative    Blood pressure 116/70, pulse 56, height 5' (1.524 m), weight 160 lb (72.6 kg), SpO2 98 %, not currently breastfeeding.     Physical Examination: General appearance - alert, well appearing, and in no distress  HEENT-head normocephalic, atraumatic  Mental status - alert, oriented to person, place, and time  Neck - supple, no significant adenopathy, no thyromegaly  Chest - clear to auscultation, no

## 2019-01-14 ENCOUNTER — OFFICE VISIT (OUTPATIENT)
Dept: CARDIOLOGY CLINIC | Age: 79
End: 2019-01-14
Payer: MEDICARE

## 2019-01-14 VITALS
HEIGHT: 60 IN | DIASTOLIC BLOOD PRESSURE: 94 MMHG | SYSTOLIC BLOOD PRESSURE: 160 MMHG | WEIGHT: 169 LBS | HEART RATE: 64 BPM | BODY MASS INDEX: 33.18 KG/M2

## 2019-01-14 DIAGNOSIS — I25.10 CORONARY ARTERY DISEASE INVOLVING NATIVE CORONARY ARTERY OF NATIVE HEART WITHOUT ANGINA PECTORIS: Primary | ICD-10-CM

## 2019-01-14 DIAGNOSIS — Z98.890 S/P CARDIAC CATH: ICD-10-CM

## 2019-01-14 DIAGNOSIS — E78.5 HYPERLIPIDEMIA, UNSPECIFIED HYPERLIPIDEMIA TYPE: ICD-10-CM

## 2019-01-14 DIAGNOSIS — I10 ESSENTIAL HYPERTENSION: ICD-10-CM

## 2019-01-14 DIAGNOSIS — Z95.820 S/P ANGIOPLASTY WITH STENT: ICD-10-CM

## 2019-01-14 PROCEDURE — 1090F PRES/ABSN URINE INCON ASSESS: CPT | Performed by: INTERNAL MEDICINE

## 2019-01-14 PROCEDURE — G8598 ASA/ANTIPLAT THER USED: HCPCS | Performed by: INTERNAL MEDICINE

## 2019-01-14 PROCEDURE — G8428 CUR MEDS NOT DOCUMENT: HCPCS | Performed by: INTERNAL MEDICINE

## 2019-01-14 PROCEDURE — G8400 PT W/DXA NO RESULTS DOC: HCPCS | Performed by: INTERNAL MEDICINE

## 2019-01-14 PROCEDURE — G8484 FLU IMMUNIZE NO ADMIN: HCPCS | Performed by: INTERNAL MEDICINE

## 2019-01-14 PROCEDURE — 1101F PT FALLS ASSESS-DOCD LE1/YR: CPT | Performed by: INTERNAL MEDICINE

## 2019-01-14 PROCEDURE — 4040F PNEUMOC VAC/ADMIN/RCVD: CPT | Performed by: INTERNAL MEDICINE

## 2019-01-14 PROCEDURE — 99214 OFFICE O/P EST MOD 30 MIN: CPT | Performed by: INTERNAL MEDICINE

## 2019-01-14 PROCEDURE — 1036F TOBACCO NON-USER: CPT | Performed by: INTERNAL MEDICINE

## 2019-01-14 PROCEDURE — G8417 CALC BMI ABV UP PARAM F/U: HCPCS | Performed by: INTERNAL MEDICINE

## 2019-01-14 PROCEDURE — 1123F ACP DISCUSS/DSCN MKR DOCD: CPT | Performed by: INTERNAL MEDICINE

## 2019-01-14 RX ORDER — ATORVASTATIN CALCIUM 20 MG/1
20 TABLET, FILM COATED ORAL DAILY
Qty: 90 TABLET | Refills: 3 | Status: SHIPPED | OUTPATIENT
Start: 2019-01-14 | End: 2019-07-09 | Stop reason: SDUPTHER

## 2019-01-14 RX ORDER — LISINOPRIL 10 MG/1
10 TABLET ORAL DAILY
Qty: 90 TABLET | Refills: 2 | Status: SHIPPED | OUTPATIENT
Start: 2019-01-14 | End: 2019-07-09 | Stop reason: SDUPTHER

## 2019-01-14 RX ORDER — DILTIAZEM HYDROCHLORIDE 120 MG/1
120 CAPSULE, COATED, EXTENDED RELEASE ORAL DAILY
Qty: 90 CAPSULE | Refills: 0 | Status: SHIPPED | OUTPATIENT
Start: 2019-01-14 | End: 2019-06-14 | Stop reason: SDUPTHER

## 2019-01-22 ENCOUNTER — HOSPITAL ENCOUNTER (OUTPATIENT)
Dept: NON INVASIVE DIAGNOSTICS | Age: 79
Discharge: HOME | End: 2019-01-22
Payer: MEDICARE

## 2019-01-22 DIAGNOSIS — Z98.890 S/P CARDIAC CATH: ICD-10-CM

## 2019-01-22 DIAGNOSIS — Z95.820 S/P ANGIOPLASTY WITH STENT: ICD-10-CM

## 2019-01-22 DIAGNOSIS — I10 ESSENTIAL HYPERTENSION: ICD-10-CM

## 2019-01-22 DIAGNOSIS — E78.5 HYPERLIPIDEMIA, UNSPECIFIED HYPERLIPIDEMIA TYPE: ICD-10-CM

## 2019-01-22 DIAGNOSIS — I25.10 CORONARY ARTERY DISEASE INVOLVING NATIVE CORONARY ARTERY OF NATIVE HEART WITHOUT ANGINA PECTORIS: ICD-10-CM

## 2019-01-22 LAB
LV EF: 60 %
LVEF MODALITY: NORMAL

## 2019-01-22 PROCEDURE — 93306 TTE W/DOPPLER COMPLETE: CPT

## 2019-06-14 RX ORDER — DILTIAZEM HYDROCHLORIDE 120 MG/1
120 CAPSULE, COATED, EXTENDED RELEASE ORAL DAILY
Qty: 90 CAPSULE | Refills: 0 | Status: SHIPPED | OUTPATIENT
Start: 2019-06-14 | End: 2019-07-09 | Stop reason: SDUPTHER

## 2019-06-14 NOTE — TELEPHONE ENCOUNTER
Jose Glover called requesting a refill on the following medications:  Requested Prescriptions     Pending Prescriptions Disp Refills    diltiazem (CARDIZEM CD) 120 MG extended release capsule 90 capsule 0     Sig: Take 1 capsule by mouth daily     Pharmacy verified:  [facility]    Pt has requested a 90 day supply      Date of last visit: 1/14/19  Date of next visit (if applicable): 3/3/4291

## 2019-06-25 ENCOUNTER — HOSPITAL ENCOUNTER (OUTPATIENT)
Age: 79
Discharge: HOME | End: 2019-06-25
Payer: MEDICARE

## 2019-06-25 DIAGNOSIS — I10 ESSENTIAL HYPERTENSION: ICD-10-CM

## 2019-06-25 DIAGNOSIS — Z95.820 S/P ANGIOPLASTY WITH STENT: ICD-10-CM

## 2019-06-25 DIAGNOSIS — E78.5 HYPERLIPIDEMIA, UNSPECIFIED HYPERLIPIDEMIA TYPE: ICD-10-CM

## 2019-06-25 DIAGNOSIS — I25.10 CORONARY ARTERY DISEASE INVOLVING NATIVE CORONARY ARTERY OF NATIVE HEART WITHOUT ANGINA PECTORIS: ICD-10-CM

## 2019-06-25 DIAGNOSIS — Z98.890 S/P CARDIAC CATH: ICD-10-CM

## 2019-06-25 LAB
ALBUMIN SERPL-MCNC: 4.2 G/DL (ref 3.5–5.1)
ALP BLD-CCNC: 84 U/L (ref 38–126)
ALT SERPL-CCNC: 20 U/L (ref 11–66)
AST SERPL-CCNC: 21 U/L (ref 5–40)
BILIRUB SERPL-MCNC: 0.7 MG/DL (ref 0.3–1.2)
BILIRUBIN DIRECT: < 0.2 MG/DL (ref 0–0.3)
CHOLESTEROL, TOTAL: 146 MG/DL (ref 100–199)
HDLC SERPL-MCNC: 44 MG/DL
LDL CHOLESTEROL CALCULATED: 60 MG/DL
TOTAL PROTEIN: 7.1 G/DL (ref 6.1–8)
TRIGL SERPL-MCNC: 209 MG/DL (ref 0–199)

## 2019-06-25 PROCEDURE — 80061 LIPID PANEL: CPT

## 2019-06-25 PROCEDURE — 80076 HEPATIC FUNCTION PANEL: CPT

## 2019-06-25 PROCEDURE — 36415 COLL VENOUS BLD VENIPUNCTURE: CPT

## 2019-07-09 ENCOUNTER — OFFICE VISIT (OUTPATIENT)
Dept: CARDIOLOGY CLINIC | Age: 79
End: 2019-07-09
Payer: MEDICARE

## 2019-07-09 VITALS
DIASTOLIC BLOOD PRESSURE: 71 MMHG | WEIGHT: 160.8 LBS | BODY MASS INDEX: 31.57 KG/M2 | HEIGHT: 60 IN | HEART RATE: 60 BPM | SYSTOLIC BLOOD PRESSURE: 127 MMHG

## 2019-07-09 DIAGNOSIS — R06.02 SOB (SHORTNESS OF BREATH) ON EXERTION: ICD-10-CM

## 2019-07-09 DIAGNOSIS — Z98.890 S/P CARDIAC CATH: ICD-10-CM

## 2019-07-09 DIAGNOSIS — I10 ESSENTIAL HYPERTENSION: ICD-10-CM

## 2019-07-09 DIAGNOSIS — I25.10 CORONARY ARTERY DISEASE INVOLVING NATIVE CORONARY ARTERY OF NATIVE HEART WITHOUT ANGINA PECTORIS: Primary | ICD-10-CM

## 2019-07-09 DIAGNOSIS — Z95.820 S/P ANGIOPLASTY WITH STENT: ICD-10-CM

## 2019-07-09 DIAGNOSIS — E78.5 HYPERLIPIDEMIA, UNSPECIFIED HYPERLIPIDEMIA TYPE: ICD-10-CM

## 2019-07-09 PROCEDURE — 1090F PRES/ABSN URINE INCON ASSESS: CPT | Performed by: INTERNAL MEDICINE

## 2019-07-09 PROCEDURE — 1036F TOBACCO NON-USER: CPT | Performed by: INTERNAL MEDICINE

## 2019-07-09 PROCEDURE — 4040F PNEUMOC VAC/ADMIN/RCVD: CPT | Performed by: INTERNAL MEDICINE

## 2019-07-09 PROCEDURE — G8400 PT W/DXA NO RESULTS DOC: HCPCS | Performed by: INTERNAL MEDICINE

## 2019-07-09 PROCEDURE — G8417 CALC BMI ABV UP PARAM F/U: HCPCS | Performed by: INTERNAL MEDICINE

## 2019-07-09 PROCEDURE — G8427 DOCREV CUR MEDS BY ELIG CLIN: HCPCS | Performed by: INTERNAL MEDICINE

## 2019-07-09 PROCEDURE — 1123F ACP DISCUSS/DSCN MKR DOCD: CPT | Performed by: INTERNAL MEDICINE

## 2019-07-09 PROCEDURE — 99214 OFFICE O/P EST MOD 30 MIN: CPT | Performed by: INTERNAL MEDICINE

## 2019-07-09 PROCEDURE — G8598 ASA/ANTIPLAT THER USED: HCPCS | Performed by: INTERNAL MEDICINE

## 2019-07-09 PROCEDURE — 93000 ELECTROCARDIOGRAM COMPLETE: CPT | Performed by: INTERNAL MEDICINE

## 2019-07-09 RX ORDER — ATORVASTATIN CALCIUM 20 MG/1
20 TABLET, FILM COATED ORAL DAILY
Qty: 90 TABLET | Refills: 3 | Status: SHIPPED | OUTPATIENT
Start: 2019-07-09 | End: 2020-01-08 | Stop reason: SDUPTHER

## 2019-07-09 RX ORDER — DILTIAZEM HYDROCHLORIDE 120 MG/1
120 CAPSULE, COATED, EXTENDED RELEASE ORAL DAILY
Qty: 90 CAPSULE | Refills: 0 | Status: SHIPPED | OUTPATIENT
Start: 2019-07-09 | End: 2019-12-09 | Stop reason: SDUPTHER

## 2019-07-09 RX ORDER — LISINOPRIL 10 MG/1
10 TABLET ORAL DAILY
Qty: 90 TABLET | Refills: 2 | Status: SHIPPED | OUTPATIENT
Start: 2019-07-09 | End: 2020-01-08 | Stop reason: SDUPTHER

## 2019-07-09 NOTE — PROGRESS NOTES
Take 1 tablet by mouth daily 90 tablet 2    atorvastatin (LIPITOR) 20 MG tablet Take 1 tablet by mouth daily 90 tablet 3    ticagrelor (BRILINTA) 90 MG TABS tablet Take 1 tablet by mouth 2 times daily 180 tablet 3    nitroGLYCERIN (NITROSTAT) 0.4 MG SL tablet up to max of 3 total doses. If no relief after 1 dose, call 911. 25 tablet 3    aspirin 81 MG tablet Take 81 mg by mouth daily       Multiple Vitamins-Minerals (MULTIVITAMIN PO) Take 1 tablet by mouth daily.  Calcium Carbonate-Vitamin D (CALCIUM 500 + D PO) Take 1 tablet by mouth daily.  oxybutynin (DITROPAN) 5 MG tablet Take 5 mg by mouth as needed. No current facility-administered medications for this visit. Allergies   Allergen Reactions    Codeine Anxiety        Family History   Problem Relation Age of Onset    Arthritis Mother     Mental Illness Mother     Arthritis Father     High Blood Pressure Father     High Cholesterol Father     Stroke Father     Mental Illness Father         Social History     Socioeconomic History    Marital status:       Spouse name: Not on file    Number of children: Not on file    Years of education: Not on file    Highest education level: Not on file   Occupational History    Not on file   Social Needs    Financial resource strain: Not on file    Food insecurity:     Worry: Not on file     Inability: Not on file    Transportation needs:     Medical: Not on file     Non-medical: Not on file   Tobacco Use    Smoking status: Never Smoker    Smokeless tobacco: Never Used   Substance and Sexual Activity    Alcohol use: No     Alcohol/week: 0.0 oz     Comment: 2 drinks per year     Drug use: No    Sexual activity: Never   Lifestyle    Physical activity:     Days per week: Not on file     Minutes per session: Not on file    Stress: Not on file   Relationships    Social connections:     Talks on phone: Not on file     Gets together: Not on file     Attends Congregational service: Not on file     Active member of club or organization: Not on file     Attends meetings of clubs or organizations: Not on file     Relationship status: Not on file    Intimate partner violence:     Fear of current or ex partner: Not on file     Emotionally abused: Not on file     Physically abused: Not on file     Forced sexual activity: Not on file   Other Topics Concern    Not on file   Social History Narrative    Not on file       Blood pressure 127/71, pulse 60, height 5' (1.524 m), weight 160 lb 12.8 oz (72.9 kg), not currently breastfeeding. Review of Systems   Constitutional: Negative. HENT: Negative. Eyes: Negative. Respiratory: Negative. Cardiovascular: Negative. Gastrointestinal: Negative. Genitourinary: Negative. Musculoskeletal: Negative. Skin: Negative. Neurological: Negative. Hematological: Negative. Psychiatric/Behavioral: Negative. Objective:   Physical Exam   [nursing notereviewed. Constitutional: She is oriented to person, place, and time. Vital signs are normal. She appears well-developed and well-nourished. [unfilled]   HENT:   Head: Normocephalic and atraumatic. Right Ear: Hearing and external ear normal.   Left Ear: Hearing and external ear normal.   Nose: Nose normal.   Mouth/Throat: Oropharynx is clear and moist and mucous membranes are normal.   Eyes: Conjunctivae, EOM and lids are normal. Pupils are equal, round, and reactive to light. Right eye exhibits no discharge. Left eye exhibits no discharge. No scleral icterus. Neck: Normal range of motion. Neck supple. No JVD present. No muscular tenderness present. Carotid bruit is not present. No edema and no erythema present. No thyromegaly present. Cardiovascular: Normal rate, regular rhythm, S1 normal, S2 normal, normal heart sounds, intact distal pulses and normal pulses. No extrasystoles are present. PMI is not displaced.   Exam reveals no gallop, no S3, no S4, no distant heart sounds after cardizem cd  Still has palpitation in the ears on waking up only      ABN LFT- off  lipitor- Repeat LFT- resolved  Tolerate crestor but expensive want to go back to lipitor  Cont  lipitor 20 mg po qd\  LFT repeat after lipitor is good  Lipid panel and liver function test before next appointment    GERD got better after nexium  Off  nexium 20    Hyperlipidemia: on statins, followed periodically. Patient need periodic lipid and liver profile. Hypertension, on medical treatment. Seems to be under reasonable control control. Patient is compliant with medical treatment. cont lisinopril to 20 mg po qd  Home BP measurement  sbp in the 120  HR  And sinus tachy and palpitation in her ears- resolved after lopressor-  Could now tolerate high dose BB  Cont  Cardizem  po qd  Cont lopressor 25 bid    Discussed use, benefit, and side effects of prescribed medications. All patient questions answered. Pt voiced understanding. Instructed to continue current medications, diet and exercise. Continue risk factor modification and medical management. Patient agreed with treatment plan. Follow up as directed.       F/U 6 months    Mario Marrufo MD, [facility] [place]

## 2019-12-09 RX ORDER — DILTIAZEM HYDROCHLORIDE 120 MG/1
CAPSULE, EXTENDED RELEASE ORAL
Qty: 90 CAPSULE | Refills: 0 | Status: SHIPPED | OUTPATIENT
Start: 2019-12-09 | End: 2020-01-08 | Stop reason: SDUPTHER

## 2019-12-11 ENCOUNTER — NURSE ONLY (OUTPATIENT)
Dept: LAB | Age: 79
End: 2019-12-11

## 2019-12-11 ENCOUNTER — OFFICE VISIT (OUTPATIENT)
Dept: INTERNAL MEDICINE CLINIC | Age: 79
End: 2019-12-11
Payer: MEDICARE

## 2019-12-11 VITALS
RESPIRATION RATE: 16 BRPM | DIASTOLIC BLOOD PRESSURE: 70 MMHG | SYSTOLIC BLOOD PRESSURE: 136 MMHG | WEIGHT: 165 LBS | BODY MASS INDEX: 32.39 KG/M2 | HEIGHT: 60 IN | HEART RATE: 56 BPM

## 2019-12-11 DIAGNOSIS — E04.2 MULTINODULAR GOITER: Primary | ICD-10-CM

## 2019-12-11 DIAGNOSIS — E04.2 MULTINODULAR GOITER: ICD-10-CM

## 2019-12-11 LAB — TSH SERPL DL<=0.05 MIU/L-ACNC: 0.85 UIU/ML (ref 0.4–4.2)

## 2019-12-11 PROCEDURE — G8598 ASA/ANTIPLAT THER USED: HCPCS | Performed by: INTERNAL MEDICINE

## 2019-12-11 PROCEDURE — G8428 CUR MEDS NOT DOCUMENT: HCPCS | Performed by: INTERNAL MEDICINE

## 2019-12-11 PROCEDURE — 1123F ACP DISCUSS/DSCN MKR DOCD: CPT | Performed by: INTERNAL MEDICINE

## 2019-12-11 PROCEDURE — 1090F PRES/ABSN URINE INCON ASSESS: CPT | Performed by: INTERNAL MEDICINE

## 2019-12-11 PROCEDURE — G8484 FLU IMMUNIZE NO ADMIN: HCPCS | Performed by: INTERNAL MEDICINE

## 2019-12-11 PROCEDURE — 1036F TOBACCO NON-USER: CPT | Performed by: INTERNAL MEDICINE

## 2019-12-11 PROCEDURE — G8400 PT W/DXA NO RESULTS DOC: HCPCS | Performed by: INTERNAL MEDICINE

## 2019-12-11 PROCEDURE — 99214 OFFICE O/P EST MOD 30 MIN: CPT | Performed by: INTERNAL MEDICINE

## 2019-12-11 PROCEDURE — 4040F PNEUMOC VAC/ADMIN/RCVD: CPT | Performed by: INTERNAL MEDICINE

## 2019-12-11 PROCEDURE — G8417 CALC BMI ABV UP PARAM F/U: HCPCS | Performed by: INTERNAL MEDICINE

## 2020-01-08 ENCOUNTER — OFFICE VISIT (OUTPATIENT)
Dept: CARDIOLOGY CLINIC | Age: 80
End: 2020-01-08
Payer: MEDICARE

## 2020-01-08 VITALS
HEIGHT: 62 IN | DIASTOLIC BLOOD PRESSURE: 68 MMHG | BODY MASS INDEX: 30.36 KG/M2 | SYSTOLIC BLOOD PRESSURE: 126 MMHG | HEART RATE: 53 BPM | WEIGHT: 165 LBS

## 2020-01-08 PROCEDURE — 99214 OFFICE O/P EST MOD 30 MIN: CPT | Performed by: INTERNAL MEDICINE

## 2020-01-08 PROCEDURE — G8427 DOCREV CUR MEDS BY ELIG CLIN: HCPCS | Performed by: INTERNAL MEDICINE

## 2020-01-08 PROCEDURE — 1123F ACP DISCUSS/DSCN MKR DOCD: CPT | Performed by: INTERNAL MEDICINE

## 2020-01-08 PROCEDURE — G8417 CALC BMI ABV UP PARAM F/U: HCPCS | Performed by: INTERNAL MEDICINE

## 2020-01-08 PROCEDURE — 4040F PNEUMOC VAC/ADMIN/RCVD: CPT | Performed by: INTERNAL MEDICINE

## 2020-01-08 PROCEDURE — 1036F TOBACCO NON-USER: CPT | Performed by: INTERNAL MEDICINE

## 2020-01-08 PROCEDURE — G8484 FLU IMMUNIZE NO ADMIN: HCPCS | Performed by: INTERNAL MEDICINE

## 2020-01-08 PROCEDURE — G8400 PT W/DXA NO RESULTS DOC: HCPCS | Performed by: INTERNAL MEDICINE

## 2020-01-08 PROCEDURE — 1090F PRES/ABSN URINE INCON ASSESS: CPT | Performed by: INTERNAL MEDICINE

## 2020-01-08 RX ORDER — LISINOPRIL 10 MG/1
10 TABLET ORAL DAILY
Qty: 90 TABLET | Refills: 3 | Status: SHIPPED | OUTPATIENT
Start: 2020-01-08 | End: 2020-07-08 | Stop reason: SDUPTHER

## 2020-01-08 RX ORDER — ATORVASTATIN CALCIUM 20 MG/1
20 TABLET, FILM COATED ORAL DAILY
Qty: 90 TABLET | Refills: 3 | Status: SHIPPED | OUTPATIENT
Start: 2020-01-08 | End: 2020-07-08 | Stop reason: SDUPTHER

## 2020-01-08 RX ORDER — DILTIAZEM HYDROCHLORIDE 120 MG/1
CAPSULE, COATED, EXTENDED RELEASE ORAL
Qty: 90 CAPSULE | Refills: 3 | Status: SHIPPED | OUTPATIENT
Start: 2020-01-08 | End: 2020-07-08 | Stop reason: SDUPTHER

## 2020-01-08 NOTE — PROGRESS NOTES
tablet 3    metoprolol tartrate (LOPRESSOR) 25 MG tablet Take 1 tablet by mouth 2 times daily 180 tablet 2    lisinopril (PRINIVIL;ZESTRIL) 10 MG tablet Take 1 tablet by mouth daily 90 tablet 2    nitroGLYCERIN (NITROSTAT) 0.4 MG SL tablet up to max of 3 total doses. If no relief after 1 dose, call 911. 25 tablet 3    aspirin 81 MG tablet Take 81 mg by mouth daily       Multiple Vitamins-Minerals (MULTIVITAMIN PO) Take 1 tablet by mouth daily.  Calcium Carbonate-Vitamin D (CALCIUM 500 + D PO) Take 1 tablet by mouth daily.  oxybutynin (DITROPAN) 5 MG tablet Take 5 mg by mouth as needed. No current facility-administered medications for this visit. Allergies   Allergen Reactions    Codeine Anxiety        Family History   Problem Relation Age of Onset    Arthritis Mother     Mental Illness Mother     Arthritis Father     High Blood Pressure Father     High Cholesterol Father     Stroke Father     Mental Illness Father         Social History     Socioeconomic History    Marital status:       Spouse name: Not on file    Number of children: Not on file    Years of education: Not on file    Highest education level: Not on file   Occupational History    Not on file   Social Needs    Financial resource strain: Not on file    Food insecurity:     Worry: Not on file     Inability: Not on file    Transportation needs:     Medical: Not on file     Non-medical: Not on file   Tobacco Use    Smoking status: Never Smoker    Smokeless tobacco: Never Used   Substance and Sexual Activity    Alcohol use: No     Alcohol/week: 0.0 standard drinks     Comment: 2 drinks per year     Drug use: No    Sexual activity: Never   Lifestyle    Physical activity:     Days per week: Not on file     Minutes per session: Not on file    Stress: Not on file   Relationships    Social connections:     Talks on phone: Not on file     Gets together: Not on file     Attends [employer] service: Not on friction rub. No murmur heard. Pulmonary/Chest: Effort normal and breath sounds normal. No stridor. No respiratory distress. She has no wheezes. She has no rales. Chest wall is not dull to percussion. She exhibits no mass, no tenderness, no bony tenderness, no crepitus, no edema, no deformity and no swelling. Abdominal: Soft. Normal appearance and bowel sounds are normal. There is no hepatosplenomegaly. No tenderness. She has no CVA tenderness. Musculoskeletal: Normal range of motion. She exhibits no edema and no tenderness. Lymphadenopathy:     She has no cervical adenopathy. She has no axillary adenopathy. Right: No inguinal adenopathy present. Left: No inguinal adenopathy present. Neurological: She is alert and oriented to person, place, and time. No cranial nerve deficit or sensory deficit. She exhibits normal muscle tone. Coordination and gait normal.   Skin: Skin is warm and dry. No abrasion, no bruising and no rash noted. She is not diaphoretic. No cyanosis. No pallor. Nails show no clubbing. Psychiatric: She has a normal mood and affect.  Her speech is normal and behavior is normal. Judgment normal.     No components found for: CHLPL  Lab Results   Component Value Date    TRIG 209 (H) 06/25/2019    TRIG 120 06/27/2018    TRIG 159 10/04/2017     Lab Results   Component Value Date    HDL 44 06/25/2019    HDL 51 06/27/2018    HDL 49 10/04/2017     Lab Results   Component Value Date    LDLCALC 60 06/25/2019    LDLCALC 73 06/27/2018    LDLCALC 67 10/04/2017     No results found for: LABVLDL  Lab Results   Component Value Date    ALT 20 06/25/2019    AST 21 06/25/2019    ALKPHOS 84 06/25/2019    BILITOT 0.7 06/25/2019       Chemistry        Component Value Date/Time     05/04/2017 0552    K 4.0 05/04/2017 0552     05/04/2017 0552    CO2 19 (L) 05/04/2017 0552    BUN 13 05/04/2017 0552    CREATININE 0.4 05/04/2017 0552        Component Value Date/Time    CALCIUM 9.3 lipitor- Repeat LFT- resolved  Tolerate crestor but expensive want to go back to lipitor  Cont  lipitor 20 mg po qd\  LFT repeat after lipitor is good  Lipid panel and liver function test before next appointment    GERD got better after nexium  Off  nexium 20    Hyperlipidemia: on statins, followed periodically. Patient need periodic lipid and liver profile. Hypertension, on medical treatment. Seems to be under GOOD  control control. Patient is compliant with medical treatment. cont lisinopril to 20 mg po qd  Home BP measurement  sbp in the 120  HR  And sinus tachy and palpitation in her ears- resolved after lopressor-  Could now tolerate high dose BB  Cont  Cardizem  po qd  Cont lopressor 25 bid    Discussed use, benefit, and side effects of prescribed medications. All patient questions answered. Pt voiced understanding. Instructed to continue current medications, diet and exercise. Continue risk factor modification and medical management. Patient agreed with treatment plan. Follow up as directed. patient is advised to exercise 30 min s a day three times a week and about weight loss ,balance diet and     More fruits and vegetables .       F/U 6 months    Ayleen Gillette MD, [facility] [place]

## 2020-01-28 ENCOUNTER — HOSPITAL ENCOUNTER (OUTPATIENT)
Dept: ULTRASOUND IMAGING | Age: 80
Discharge: HOME | End: 2020-01-28
Payer: MEDICARE

## 2020-01-28 PROCEDURE — 76536 US EXAM OF HEAD AND NECK: CPT

## 2020-06-16 ENCOUNTER — HOSPITAL ENCOUNTER (OUTPATIENT)
Age: 80
Discharge: HOME | End: 2020-06-16
Payer: MEDICARE

## 2020-06-16 LAB
ALBUMIN SERPL-MCNC: 4.3 G/DL (ref 3.5–5.1)
ALP BLD-CCNC: 100 U/L (ref 38–126)
ALT SERPL-CCNC: 17 U/L (ref 11–66)
AST SERPL-CCNC: 24 U/L (ref 5–40)
BILIRUB SERPL-MCNC: 0.6 MG/DL (ref 0.3–1.2)
BILIRUBIN DIRECT: < 0.2 MG/DL (ref 0–0.3)
CHOLESTEROL, TOTAL: 151 MG/DL (ref 100–199)
HDLC SERPL-MCNC: 48 MG/DL
LDL CHOLESTEROL CALCULATED: 70 MG/DL
TOTAL PROTEIN: 7 G/DL (ref 6.1–8)
TRIGL SERPL-MCNC: 165 MG/DL (ref 0–199)

## 2020-06-16 PROCEDURE — 80061 LIPID PANEL: CPT

## 2020-06-16 PROCEDURE — 36415 COLL VENOUS BLD VENIPUNCTURE: CPT

## 2020-06-16 PROCEDURE — 80076 HEPATIC FUNCTION PANEL: CPT

## 2020-07-08 ENCOUNTER — OFFICE VISIT (OUTPATIENT)
Dept: CARDIOLOGY CLINIC | Age: 80
End: 2020-07-08
Payer: MEDICARE

## 2020-07-08 VITALS
WEIGHT: 161.25 LBS | HEART RATE: 60 BPM | DIASTOLIC BLOOD PRESSURE: 71 MMHG | SYSTOLIC BLOOD PRESSURE: 129 MMHG | HEIGHT: 62 IN | BODY MASS INDEX: 29.67 KG/M2

## 2020-07-08 PROCEDURE — 1123F ACP DISCUSS/DSCN MKR DOCD: CPT | Performed by: INTERNAL MEDICINE

## 2020-07-08 PROCEDURE — 93000 ELECTROCARDIOGRAM COMPLETE: CPT | Performed by: INTERNAL MEDICINE

## 2020-07-08 PROCEDURE — G8400 PT W/DXA NO RESULTS DOC: HCPCS | Performed by: INTERNAL MEDICINE

## 2020-07-08 PROCEDURE — 4040F PNEUMOC VAC/ADMIN/RCVD: CPT | Performed by: INTERNAL MEDICINE

## 2020-07-08 PROCEDURE — G8417 CALC BMI ABV UP PARAM F/U: HCPCS | Performed by: INTERNAL MEDICINE

## 2020-07-08 PROCEDURE — 1090F PRES/ABSN URINE INCON ASSESS: CPT | Performed by: INTERNAL MEDICINE

## 2020-07-08 PROCEDURE — G8427 DOCREV CUR MEDS BY ELIG CLIN: HCPCS | Performed by: INTERNAL MEDICINE

## 2020-07-08 PROCEDURE — 99214 OFFICE O/P EST MOD 30 MIN: CPT | Performed by: INTERNAL MEDICINE

## 2020-07-08 PROCEDURE — 1036F TOBACCO NON-USER: CPT | Performed by: INTERNAL MEDICINE

## 2020-07-08 RX ORDER — LISINOPRIL 10 MG/1
10 TABLET ORAL DAILY
Qty: 90 TABLET | Refills: 3 | Status: SHIPPED | OUTPATIENT
Start: 2020-07-08 | End: 2021-07-13

## 2020-07-08 RX ORDER — ATORVASTATIN CALCIUM 20 MG/1
20 TABLET, FILM COATED ORAL DAILY
Qty: 90 TABLET | Refills: 3 | Status: SHIPPED | OUTPATIENT
Start: 2020-07-08 | End: 2021-07-20 | Stop reason: SDUPTHER

## 2020-07-08 RX ORDER — DILTIAZEM HYDROCHLORIDE 120 MG/1
CAPSULE, COATED, EXTENDED RELEASE ORAL
Qty: 90 CAPSULE | Refills: 3 | Status: SHIPPED | OUTPATIENT
Start: 2020-07-08 | End: 2021-01-11

## 2020-07-08 NOTE — PROGRESS NOTES
Subjective:      Patient ID: Ladan Espinoza is a 78 y.o. female. HPI  Chief Complaint   Patient presents with   [place] Sin    Coronary Artery Disease    Hypertension     Originally  Has some \"dull pain in her Rt breast when she touches it\". NONtender and had Mamogram and was okay   The Chest discomfort on the RT siae last 2 sec once in a while   The chest discomfort is not related to exercise  Awaiting for Barium eval  Has dysphagia at times  Has gotten better in last couple months. She is not sure if it is heartburn. Pt here for 6 months Follow up for CAD    Pt had EKG today    Patient denies cp,  Palpitation, dizziness and swelling     NO sob    Feel good    Started on lopressor for HTN and palpit and could not tolerate - hence stop it. The pyrosis got better after nexium 40 mg po qd    Patient Seen, Chart, Consults notes, Labs, Radiology studies reviewed.     [id number] [facility],[address]  Father had stent at age -63's    Patient Active Problem List   Diagnosis    Multinodular goiter    Hyperlipidemia    Hypertension    Abnormal LFTs- may 2014- resolved    GERD (gastroesophageal reflux disease)    Hepatic cyst-under F/u with GI    Heart palpitations in the ears only on waking up in the AM    Sinus tachycardia    S/P cardiac cath- 5/3/17- LM-P, LAD MID 99% STENOSIS , LCX OSTIAL 30% STENOSIS, RCA MILD LUMINAL IRREGULARITIES, EDP 4 , EF 60%, MODERATE SIZED ANTEROLATERAL ANEURYSM, - PCI OF THE MID LAD DONE    S/P angioplasty with stent OF THE MID LAD    Coronary artery disease involving native coronary artery of native heart without angina pectoris    Sinus bradycardia    SOB (shortness of breath) on exertion       Current Outpatient Medications   Medication Sig Dispense Refill    dilTIAZem (CARTIA XT) 120 MG extended release capsule TAKE 1 CAPSULE BY MOUTH ONCE DAILY 90 capsule 3    ticagrelor (BRILINTA) 90 MG TABS tablet Take 1 tablet by mouth 2 times daily 180 tablet 3    atorvastatin (LIPITOR) 20 MG tablet Take 1 tablet by mouth daily 90 tablet 3    metoprolol tartrate (LOPRESSOR) 25 MG tablet Take 1 tablet by mouth 2 times daily 180 tablet 3    lisinopril (PRINIVIL;ZESTRIL) 10 MG tablet Take 1 tablet by mouth daily 90 tablet 3    nitroGLYCERIN (NITROSTAT) 0.4 MG SL tablet up to max of 3 total doses. If no relief after 1 dose, call 911. 25 tablet 3    aspirin 81 MG tablet Take 81 mg by mouth daily       Multiple Vitamins-Minerals (MULTIVITAMIN PO) Take 1 tablet by mouth daily.  Calcium Carbonate-Vitamin D (CALCIUM 500 + D PO) Take 1 tablet by mouth daily.  oxybutynin (DITROPAN) 5 MG tablet Take 5 mg by mouth as needed. No current facility-administered medications for this visit. Allergies   Allergen Reactions    Codeine Anxiety        Family History   Problem Relation Age of Onset    Arthritis Mother     Mental Illness Mother     Arthritis Father     High Blood Pressure Father     High Cholesterol Father     Stroke Father     Mental Illness Father         Social History     Socioeconomic History    Marital status:       Spouse name: Not on file    Number of children: Not on file    Years of education: Not on file    Highest education level: Not on file   Occupational History    Not on file   Social Needs    Financial resource strain: Not on file    Food insecurity     Worry: Not on file     Inability: Not on file    Transportation needs     Medical: Not on file     Non-medical: Not on file   Tobacco Use    Smoking status: Never Smoker    Smokeless tobacco: Never Used   Substance and Sexual Activity    Alcohol use: No     Alcohol/week: 0.0 standard drinks     Comment: 2 drinks per year     Drug use: No    Sexual activity: Never   Lifestyle    Physical activity     Days per week: Not on file     Minutes per session: Not on file    Stress: Not on file   Relationships    Social connections     Talks on phone: Not on file     Gets together: Not on file Attends Voodoo service: Not on file     Active member of club or organization: Not on file     Attends meetings of clubs or organizations: Not on file     Relationship status: Not on file    Intimate partner violence     Fear of current or ex partner: Not on file     Emotionally abused: Not on file     Physically abused: Not on file     Forced sexual activity: Not on file   Other Topics Concern    Not on file   Social History Narrative    Not on file       Blood pressure 129/71, pulse 60, height 5' 2\" (1.575 m), weight 161 lb 4 oz (73.1 kg), not currently breastfeeding. Review of Systems   Constitutional: Negative. HENT: Negative. Eyes: Negative. Respiratory: Negative. Cardiovascular: Negative. Gastrointestinal: Negative. Genitourinary: Negative. Musculoskeletal: Negative. Skin: Negative. Neurological: Negative. Hematological: Negative. Psychiatric/Behavioral: Negative. Objective:   Physical Exam   [nursing notereviewed. Constitutional: She is oriented to person, place, and time. Vital signs are normal. She appears well-developed and well-nourished. [unfilled]   HENT:   Head: Normocephalic and atraumatic. Right Ear: Hearing and external ear normal.   Left Ear: Hearing and external ear normal.   Nose: Nose normal.   Mouth/Throat: Oropharynx is clear and moist and mucous membranes are normal.   Eyes: Conjunctivae, EOM and lids are normal. Pupils are equal, round, and reactive to light. Right eye exhibits no discharge. Left eye exhibits no discharge. No scleral icterus. Neck: Normal range of motion. Neck supple. No JVD present. No muscular tenderness present. Carotid bruit is not present. No edema and no erythema present. No thyromegaly present. Cardiovascular: Normal rate, regular rhythm, S1 normal, S2 normal, normal heart sounds, intact distal pulses and normal pulses. No extrasystoles are present. PMI is not displaced.   Exam reveals no gallop, no S3, no S4, no distant heart sounds and no friction rub. No murmur heard. Pulmonary/Chest: Effort normal and breath sounds normal. No stridor. No respiratory distress. She has no wheezes. She has no rales. Chest wall is not dull to percussion. She exhibits no mass, no tenderness, no bony tenderness, no crepitus, no edema, no deformity and no swelling. Abdominal: Soft. Normal appearance and bowel sounds are normal. There is no hepatosplenomegaly. No tenderness. She has no CVA tenderness. Musculoskeletal: Normal range of motion. She exhibits no edema and no tenderness. Lymphadenopathy:     She has no cervical adenopathy. She has no axillary adenopathy. Right: No inguinal adenopathy present. Left: No inguinal adenopathy present. Neurological: She is alert and oriented to person, place, and time. No cranial nerve deficit or sensory deficit. She exhibits normal muscle tone. Coordination and gait normal.   Skin: Skin is warm and dry. No abrasion, no bruising and no rash noted. She is not diaphoretic. No cyanosis. No pallor. Nails show no clubbing. Psychiatric: She has a normal mood and affect.  Her speech is normal and behavior is normal. Judgment normal.     No components found for: CHLPL  Lab Results   Component Value Date    TRIG 165 06/16/2020    TRIG 209 (H) 06/25/2019    TRIG 120 06/27/2018     Lab Results   Component Value Date    HDL 48 06/16/2020    HDL 44 06/25/2019    HDL 51 06/27/2018     Lab Results   Component Value Date    LDLCALC 70 06/16/2020    LDLCALC 60 06/25/2019    LDLCALC 73 06/27/2018     No results found for: LABVLDL  Lab Results   Component Value Date    ALT 17 06/16/2020    AST 24 06/16/2020    ALKPHOS 100 06/16/2020    BILITOT 0.6 06/16/2020       Chemistry        Component Value Date/Time     05/04/2017 0552    K 4.0 05/04/2017 0552     05/04/2017 0552    CO2 19 (L) 05/04/2017 0552    BUN 13 05/04/2017 0552    CREATININE 0.4 05/04/2017 0552        Component good  Palpitation getting better after cardizem cd  Still has palpitation in the ears on waking up only      Hx of ABN LFT- off  lipitor- Repeat LFT- resolved  Tolerate crestor but expensive want to go back to lipitor  Cont  lipitor 20 mg po qd\  LFT repeat after lipitor remains good  Lipid panel and liver function test before next appointment    GERD got better after nexium  Off  nexium 20    Hyperlipidemia: on statins, followed periodically. Patient need periodic lipid and liver profile. Hypertension, on medical treatment. Seems to be under GOOD  control control. Patient is compliant with medical treatment. cont lisinopril to 20 mg po qd  Home BP measurement  sbp in the 120  HR  And sinus tachy and palpitation in her ears- resolved after lopressor-  Could now tolerate high dose BB  Cont  Cardizem  po qd  Cont lopressor 25 bid    Discussed use, benefit, and side effects of prescribed medications. All patient questions answered. Pt voiced understanding. Instructed to continue current medications, diet and exercise. Continue risk factor modification and medical management. Patient agreed with treatment plan. Follow up as directed. patient is advised to exercise 30 min s a day three times a week and about weight loss ,balance diet and     More fruits and vegetables .     D/w the pat plan of care and doing well      F/U 6 months    Rosalba Bess MD, [facility] [place]

## 2020-12-09 ENCOUNTER — OFFICE VISIT (OUTPATIENT)
Dept: INTERNAL MEDICINE CLINIC | Age: 80
End: 2020-12-09
Payer: MEDICARE

## 2020-12-09 VITALS
TEMPERATURE: 97.4 F | DIASTOLIC BLOOD PRESSURE: 63 MMHG | WEIGHT: 164 LBS | HEART RATE: 64 BPM | BODY MASS INDEX: 32.2 KG/M2 | SYSTOLIC BLOOD PRESSURE: 138 MMHG | HEIGHT: 60 IN

## 2020-12-09 PROCEDURE — 1036F TOBACCO NON-USER: CPT | Performed by: INTERNAL MEDICINE

## 2020-12-09 PROCEDURE — 99213 OFFICE O/P EST LOW 20 MIN: CPT | Performed by: INTERNAL MEDICINE

## 2020-12-09 PROCEDURE — 1090F PRES/ABSN URINE INCON ASSESS: CPT | Performed by: INTERNAL MEDICINE

## 2020-12-09 PROCEDURE — G8400 PT W/DXA NO RESULTS DOC: HCPCS | Performed by: INTERNAL MEDICINE

## 2020-12-09 PROCEDURE — G8417 CALC BMI ABV UP PARAM F/U: HCPCS | Performed by: INTERNAL MEDICINE

## 2020-12-09 PROCEDURE — G8427 DOCREV CUR MEDS BY ELIG CLIN: HCPCS | Performed by: INTERNAL MEDICINE

## 2020-12-09 PROCEDURE — G8484 FLU IMMUNIZE NO ADMIN: HCPCS | Performed by: INTERNAL MEDICINE

## 2020-12-09 PROCEDURE — 4040F PNEUMOC VAC/ADMIN/RCVD: CPT | Performed by: INTERNAL MEDICINE

## 2020-12-09 PROCEDURE — 1123F ACP DISCUSS/DSCN MKR DOCD: CPT | Performed by: INTERNAL MEDICINE

## 2020-12-09 NOTE — PROGRESS NOTES
Review of Systems - General ROS: negative  Psychological ROS: negative  Hematological and Lymphatic ROS: negative  Respiratory ROS: no cough, shortness of breath, or wheezing  Cardiovascular ROS: no chest pain or dyspnea on exertion  Gastrointestinal ROS: no abdominal pain, change in bowel habits, or black or bloody stools  Genito-Urinary ROS: no dysuria, trouble voiding, or hematuria  Musculoskeletal ROS: negative  Neurological ROS: no TIA or stroke symptoms  Dermatological ROS: negative    Blood pressure 138/63, pulse 64, temperature 97.4 °F (36.3 °C), height 5' (1.524 m), weight 164 lb (74.4 kg), not currently breastfeeding. Physical Examination: General appearance - alert, well appearing, and in no distress  HEENT-head normocephalic, atraumatic  Mental status - alert, oriented to person, place, and time  Neck - supple, no significant adenopathy, no thyromegaly  Chest - clear to auscultation, no wheezes, rales or rhonchi, symmetric air entry  Heart - normal rate, regular rhythm, normal S1, S2, no murmurs, rubs, clicks or gallops  Abdomen - soft, nontender, nondistended, no masses or organomegaly  Neurological - alert, oriented, normal speech, no focal findings or movement disorder noted  Musculoskeletal - no joint tenderness, deformity or swelling  Extremities - peripheral pulses normal, no pedal edema, no clubbing or cyanosis  Skin - normal coloration and turgor, no rashes, no suspicious skin lesions noted        Diagnosis Orders   1. Multinodular goiter     2. ASCVD (arteriosclerotic cardiovascular disease)     3. Essential hypertension             thyroid ultrasound shows a multinodular goiter. Ultrasound January of this year showed all nodules are of  low suspicion for malignancy  I reviewed patient's medications and possible interactions and side effects. I refilled those that were needed.       I told her that I could see her yearly

## 2021-01-11 RX ORDER — DILTIAZEM HYDROCHLORIDE 120 MG/1
CAPSULE, COATED, EXTENDED RELEASE ORAL
Qty: 30 CAPSULE | Refills: 0 | Status: SHIPPED | OUTPATIENT
Start: 2021-01-11 | End: 2021-01-19 | Stop reason: SDUPTHER

## 2021-01-19 ENCOUNTER — OFFICE VISIT (OUTPATIENT)
Dept: CARDIOLOGY CLINIC | Age: 81
End: 2021-01-19
Payer: MEDICARE

## 2021-01-19 ENCOUNTER — IMMUNIZATION (OUTPATIENT)
Dept: PRIMARY CARE CLINIC | Age: 81
End: 2021-01-19
Payer: MEDICARE

## 2021-01-19 VITALS
BODY MASS INDEX: 32.2 KG/M2 | WEIGHT: 164 LBS | SYSTOLIC BLOOD PRESSURE: 150 MMHG | HEIGHT: 60 IN | HEART RATE: 57 BPM | DIASTOLIC BLOOD PRESSURE: 73 MMHG

## 2021-01-19 DIAGNOSIS — R07.89 CHEST PAIN, ATYPICAL: ICD-10-CM

## 2021-01-19 DIAGNOSIS — R06.02 SOB (SHORTNESS OF BREATH) ON EXERTION: ICD-10-CM

## 2021-01-19 DIAGNOSIS — Z95.820 S/P ANGIOPLASTY WITH STENT: ICD-10-CM

## 2021-01-19 DIAGNOSIS — E78.00 PURE HYPERCHOLESTEROLEMIA: ICD-10-CM

## 2021-01-19 DIAGNOSIS — I25.10 CORONARY ARTERY DISEASE INVOLVING NATIVE CORONARY ARTERY OF NATIVE HEART WITHOUT ANGINA PECTORIS: Primary | ICD-10-CM

## 2021-01-19 DIAGNOSIS — Z98.890 S/P CARDIAC CATH: ICD-10-CM

## 2021-01-19 DIAGNOSIS — I10 ESSENTIAL HYPERTENSION: ICD-10-CM

## 2021-01-19 PROCEDURE — 0011A COVID-19, MODERNA VACCINE 100MCG/0.5ML DOSE: CPT | Performed by: FAMILY MEDICINE

## 2021-01-19 PROCEDURE — 4040F PNEUMOC VAC/ADMIN/RCVD: CPT | Performed by: INTERNAL MEDICINE

## 2021-01-19 PROCEDURE — 1090F PRES/ABSN URINE INCON ASSESS: CPT | Performed by: INTERNAL MEDICINE

## 2021-01-19 PROCEDURE — G8484 FLU IMMUNIZE NO ADMIN: HCPCS | Performed by: INTERNAL MEDICINE

## 2021-01-19 PROCEDURE — 93000 ELECTROCARDIOGRAM COMPLETE: CPT | Performed by: INTERNAL MEDICINE

## 2021-01-19 PROCEDURE — G8427 DOCREV CUR MEDS BY ELIG CLIN: HCPCS | Performed by: INTERNAL MEDICINE

## 2021-01-19 PROCEDURE — 1036F TOBACCO NON-USER: CPT | Performed by: INTERNAL MEDICINE

## 2021-01-19 PROCEDURE — 99213 OFFICE O/P EST LOW 20 MIN: CPT | Performed by: INTERNAL MEDICINE

## 2021-01-19 PROCEDURE — 91301 COVID-19, MODERNA VACCINE 100MCG/0.5ML DOSE: CPT | Performed by: FAMILY MEDICINE

## 2021-01-19 PROCEDURE — G8400 PT W/DXA NO RESULTS DOC: HCPCS | Performed by: INTERNAL MEDICINE

## 2021-01-19 PROCEDURE — G8417 CALC BMI ABV UP PARAM F/U: HCPCS | Performed by: INTERNAL MEDICINE

## 2021-01-19 PROCEDURE — 1123F ACP DISCUSS/DSCN MKR DOCD: CPT | Performed by: INTERNAL MEDICINE

## 2021-01-19 RX ORDER — DILTIAZEM HYDROCHLORIDE 120 MG/1
CAPSULE, COATED, EXTENDED RELEASE ORAL
Qty: 90 CAPSULE | Refills: 3 | Status: SHIPPED | OUTPATIENT
Start: 2021-01-19 | End: 2021-07-20 | Stop reason: SDUPTHER

## 2021-01-19 RX ORDER — NITROGLYCERIN 0.4 MG/1
TABLET SUBLINGUAL
Qty: 25 TABLET | Refills: 3 | Status: SHIPPED | OUTPATIENT
Start: 2021-01-19 | End: 2022-05-09 | Stop reason: SDUPTHER

## 2021-01-19 NOTE — PROGRESS NOTES
Subjective:      Patient ID: Ryan Bar is a [de-identified] y.o. female. HPI  Chief Complaint   Patient presents with    6 Month Follow-Up    Coronary Artery Disease     Originally  Has some \"dull pain in her Rt breast when she touches it\". NONtender and had Mamogram and was okay   The Chest discomfort on the RT siae last 2 sec once in a while   The chest discomfort is not related to exercise  Awaiting for Barium eval  Has dysphagia at times  Has gotten better in last couple months. She is not sure if it is heartburn. Pt here for 6 months Follow up for CAD    Patient denies cp,  Palpitation, dizziness and swelling     One spisode of chest pain burning 1 week back while sleeping and took tums resolved  Lasted 10 min 6/10, no associated symptom  None after and before    NO sob    Feel good    Started on lopressor for HTN and palpit and could not tolerate - hence stop it. The pyrosis got better after nexium 40 mg po qd    Patient Seen, Chart, Consults notes, Labs, Radiology studies reviewed. [id number] [facility],[address]  Father had stent at age -63's    Patient Active Problem List   Diagnosis    Multinodular goiter    Hyperlipidemia    Hypertension    Abnormal LFTs- may 2014- resolved    GERD (gastroesophageal reflux disease)    Hepatic cyst-under F/u with GI    Heart palpitations in the ears only on waking up in the AM    Sinus tachycardia    S/P cardiac cath- 5/3/17- LM-P, LAD MID 99% STENOSIS , LCX OSTIAL 30% STENOSIS, RCA MILD LUMINAL IRREGULARITIES, EDP 4 , EF 60%, MODERATE SIZED ANTEROLATERAL ANEURYSM, - PCI OF THE MID LAD DONE    S/P angioplasty with stent OF THE MID LAD    Coronary artery disease involving native coronary artery of native heart without angina pectoris    Sinus bradycardia    SOB (shortness of breath) on exertion    Chest pain, atypical       Current Outpatient Medications   Medication Sig Dispense Refill    nitroGLYCERIN (NITROSTAT) 0.4 MG SL tablet up to max of 3 total doses.  If no relief after 3rd dose, call 911. 25 tablet 3    dilTIAZem (CARTIA XT) 120 MG extended release capsule Take 1 capsule by mouth once daily 90 capsule 3    ticagrelor (BRILINTA) 90 MG TABS tablet Take 1 tablet by mouth 2 times daily 180 tablet 3    atorvastatin (LIPITOR) 20 MG tablet Take 1 tablet by mouth daily 90 tablet 3    metoprolol tartrate (LOPRESSOR) 25 MG tablet Take 1 tablet by mouth 2 times daily 180 tablet 3    lisinopril (PRINIVIL;ZESTRIL) 10 MG tablet Take 1 tablet by mouth daily 90 tablet 3    aspirin 81 MG tablet Take 81 mg by mouth daily       Multiple Vitamins-Minerals (MULTIVITAMIN PO) Take 1 tablet by mouth daily.  Calcium Carbonate-Vitamin D (CALCIUM 500 + D PO) Take 1 tablet by mouth daily.  oxybutynin (DITROPAN) 5 MG tablet Take 5 mg by mouth as needed. No current facility-administered medications for this visit. Allergies   Allergen Reactions    Codeine Anxiety        Family History   Problem Relation Age of Onset    Arthritis Mother     Mental Illness Mother     Arthritis Father     High Blood Pressure Father     High Cholesterol Father     Stroke Father     Mental Illness Father         Social History     Socioeconomic History    Marital status:       Spouse name: Not on file    Number of children: Not on file    Years of education: Not on file    Highest education level: Not on file   Occupational History    Not on file   Social Needs    Financial resource strain: Not on file    Food insecurity     Worry: Not on file     Inability: Not on file    Transportation needs     Medical: Not on file     Non-medical: Not on file   Tobacco Use    Smoking status: Never Smoker    Smokeless tobacco: Never Used   Substance and Sexual Activity    Alcohol use: No     Alcohol/week: 0.0 standard drinks     Comment: 2 drinks per year     Drug use: No    Sexual activity: Never   Lifestyle    Physical activity     Days per week: Not on file     Minutes per session: Not on file    Stress: Not on file   Relationships    Social connections     Talks on phone: Not on file     Gets together: Not on file     Attends Gnosticist service: Not on file     Active member of club or organization: Not on file     Attends meetings of clubs or organizations: Not on file     Relationship status: Not on file    Intimate partner violence     Fear of current or ex partner: Not on file     Emotionally abused: Not on file     Physically abused: Not on file     Forced sexual activity: Not on file   Other Topics Concern    Not on file   Social History Narrative    Not on file       Blood pressure (!) 150/73, pulse 57, height 5' (1.524 m), weight 164 lb (74.4 kg), not currently breastfeeding. Review of Systems   Constitutional: Negative. HENT: Negative. Eyes: Negative. Respiratory: Negative. Cardiovascular: Negative. Gastrointestinal: Negative. Genitourinary: Negative. Musculoskeletal: Negative. Skin: Negative. Neurological: Negative. Hematological: Negative. Psychiatric/Behavioral: Negative. Objective:   Physical Exam   [nursing notereviewed. Constitutional: She is oriented to person, place, and time. Vital signs are normal. She appears well-developed and well-nourished. [unfilled]   HENT:   Head: Normocephalic and atraumatic. Right Ear: Hearing and external ear normal.   Left Ear: Hearing and external ear normal.   Nose: Nose normal.   Mouth/Throat: Oropharynx is clear and moist and mucous membranes are normal.   Eyes: Conjunctivae, EOM and lids are normal. Pupils are equal, round, and reactive to light. Right eye exhibits no discharge. Left eye exhibits no discharge. No scleral icterus. Neck: Normal range of motion. Neck supple. No JVD present. No muscular tenderness present. Carotid bruit is not present. No edema and no erythema present. No thyromegaly present.    Cardiovascular: Normal rate, regular rhythm, S1 normal, S2 normal, 05/04/2017 0552     05/04/2017 0552    CO2 19 (L) 05/04/2017 0552    BUN 13 05/04/2017 0552    CREATININE 0.4 05/04/2017 0552        Component Value Date/Time    CALCIUM 9.3 05/04/2017 0552    ALKPHOS 100 06/16/2020 0745    AST 24 06/16/2020 0745    ALT 17 06/16/2020 0745    BILITOT 0.6 06/16/2020 0745            EKG: NSR, NO ST-T CHANGES,  Stress test nuc negative   ECHO ef 60%    EKG 07/18/13-Sinus  Rhythm WITHIN NORMAL LIMITS    EKG 5/1/14/-Sinus  Rhythm   WITHIN NORMAL LIMITS    Holter  CONCLUSION:   1. Predominantly sinus rhythm. 2. Rare PACs and PVCs. 3. No obvious sustained arrhythmias. Minimum heart rate was 47 beats per minute, maximum   heart rate was 151 beats per minute. Average heart rate was 75 beats per   minute  Laurice Snellen, M.D.   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 Coronary artery disease involving native coronary artery of native heart without angina pectoris  Hepatic Function Panel    Lipid Panel    Stress test, lexiscan    EKG 12 lead   2. Chest pain, atypical  Stress test, lexiscan    EKG 12 lead   3. SOB (shortness of breath) on exertion  Stress test, lexiscan    EKG 12 lead   4. Essential hypertension  Hepatic Function Panel    Lipid Panel    Stress test, lexiscan    EKG 12 lead   5. Pure hypercholesterolemia  Hepatic Function Panel    Lipid Panel    Stress test, lexiscan    EKG 12 lead   6. S/P cardiac cath- 5/3/17- LM-P, LAD MID 99% STENOSIS , LCX OSTIAL 30% STENOSIS, RCA MILD LUMINAL IRREGULARITIES, EDP 4 , EF 60%, MODERATE SIZED ANTEROLATERAL ANEURYSM, - PCI OF THE MID LAD DONE  Hepatic Function Panel    Lipid Panel    Stress test, lexiscan    EKG 12 lead   7.  S/P angioplasty with stent OF THE MID LAD  Hepatic Function Panel    Lipid Panel    Stress test, lexiscan    EKG 12 lead           Plan:       The most Current meds and labs reviewed    Continue the current treatment and with constant vigilance to changes in symptoms and also any potential side effects. Return for care or seek medical attention immediately if symptoms got worse and/or develop new symptoms. Sinus bradycardia- better  Holter  Is okay No significant bradycardia-   cont lopressor  25 po bid    Coronary artery disease, seems to be stable. S/p stent of LAD  Cont asa and brilinta  One episode of chest pain  Sob on exertion  lexisc nuc  Pat advised to call or go to ER if any recurrence of cp      Palpitations- improved with  lopressor 25  po bid-  pat feel good  Palpitation getting better after cardizem cd  Still has palpitation in the ears on waking up only      Hx of ABN LFT- off  lipitor- Repeat LFT- resolved  Tolerate crestor but expensive want to go back to lipitor  Cont  lipitor 20 mg po qd\  LFT repeat after lipitor remains good  Lipid panel and liver function test before next appointment    GERD got better after nexium  Off  nexium 20    Hyperlipidemia: on statins, followed periodically. Patient need periodic lipid and liver profile. Hypertension, on medical treatment. Seems to be under GOOD  control control. Patient is compliant with medical treatment. cont lisinopril to 20 mg po qd  Home BP measurement  sbp in the 135 to 140  And sinus tachy and palpitation in her ears- resolved after lopressor-  Could now tolerate high dose BB  Cont  Cardizem  po qd  Cont lopressor 25 bid      Discussed use, benefit, and side effects of prescribed medications. All patient questions answered. Pt voiced understanding. Instructed to continue current medications, diet and exercise. Continue risk factor modification and medical management. Patient agreed with treatment plan.  Follow up as directed. patient is advised to exercise 30 min s a day three times a week and about weight loss ,balance diet and     More fruits and vegetables .     D/w the pat plan of care and doing well      F/U 6 months    Jalyn Pelaez MD, [facility] - [place]

## 2021-01-27 ENCOUNTER — HOSPITAL ENCOUNTER (OUTPATIENT)
Dept: NON INVASIVE DIAGNOSTICS | Age: 81
Discharge: HOME | End: 2021-01-27
Payer: MEDICARE

## 2021-01-27 DIAGNOSIS — Z98.890 S/P CARDIAC CATH: ICD-10-CM

## 2021-01-27 DIAGNOSIS — I10 ESSENTIAL HYPERTENSION: ICD-10-CM

## 2021-01-27 DIAGNOSIS — Z95.820 S/P ANGIOPLASTY WITH STENT: ICD-10-CM

## 2021-01-27 DIAGNOSIS — R07.89 CHEST PAIN, ATYPICAL: ICD-10-CM

## 2021-01-27 DIAGNOSIS — R06.02 SOB (SHORTNESS OF BREATH) ON EXERTION: ICD-10-CM

## 2021-01-27 DIAGNOSIS — I25.10 CORONARY ARTERY DISEASE INVOLVING NATIVE CORONARY ARTERY OF NATIVE HEART WITHOUT ANGINA PECTORIS: ICD-10-CM

## 2021-01-27 DIAGNOSIS — E78.00 PURE HYPERCHOLESTEROLEMIA: ICD-10-CM

## 2021-01-27 PROCEDURE — 93017 CV STRESS TEST TRACING ONLY: CPT | Performed by: INTERNAL MEDICINE

## 2021-01-27 PROCEDURE — 6360000002 HC RX W HCPCS

## 2021-01-27 PROCEDURE — 78452 HT MUSCLE IMAGE SPECT MULT: CPT

## 2021-01-27 PROCEDURE — A9500 TC99M SESTAMIBI: HCPCS | Performed by: INTERNAL MEDICINE

## 2021-01-27 PROCEDURE — 3430000000 HC RX DIAGNOSTIC RADIOPHARMACEUTICAL: Performed by: INTERNAL MEDICINE

## 2021-01-27 RX ADMIN — Medication 31 MILLICURIE: at 14:16

## 2021-01-27 RX ADMIN — Medication 9.3 MILLICURIE: at 13:15

## 2021-01-29 ENCOUNTER — HOSPITAL ENCOUNTER (OUTPATIENT)
Age: 81
Discharge: HOME | End: 2021-01-29
Payer: MEDICARE

## 2021-01-29 DIAGNOSIS — Z98.890 S/P CARDIAC CATH: ICD-10-CM

## 2021-01-29 DIAGNOSIS — I10 ESSENTIAL HYPERTENSION: ICD-10-CM

## 2021-01-29 DIAGNOSIS — I25.10 CORONARY ARTERY DISEASE INVOLVING NATIVE CORONARY ARTERY OF NATIVE HEART WITHOUT ANGINA PECTORIS: ICD-10-CM

## 2021-01-29 DIAGNOSIS — Z95.820 S/P ANGIOPLASTY WITH STENT: ICD-10-CM

## 2021-01-29 DIAGNOSIS — E78.00 PURE HYPERCHOLESTEROLEMIA: ICD-10-CM

## 2021-01-29 LAB
ALBUMIN SERPL-MCNC: 4.4 G/DL (ref 3.5–5.1)
ALP BLD-CCNC: 95 U/L (ref 38–126)
ALT SERPL-CCNC: 21 U/L (ref 11–66)
AST SERPL-CCNC: 21 U/L (ref 5–40)
BILIRUB SERPL-MCNC: 0.5 MG/DL (ref 0.3–1.2)
BILIRUBIN DIRECT: < 0.2 MG/DL (ref 0–0.3)
CHOLESTEROL, TOTAL: 161 MG/DL (ref 100–199)
HDLC SERPL-MCNC: 50 MG/DL
LDL CHOLESTEROL CALCULATED: 81 MG/DL
TOTAL PROTEIN: 7.5 G/DL (ref 6.1–8)
TRIGL SERPL-MCNC: 148 MG/DL (ref 0–199)

## 2021-01-29 PROCEDURE — 36415 COLL VENOUS BLD VENIPUNCTURE: CPT

## 2021-01-29 PROCEDURE — 80061 LIPID PANEL: CPT

## 2021-01-29 PROCEDURE — 80076 HEPATIC FUNCTION PANEL: CPT

## 2021-02-23 ENCOUNTER — IMMUNIZATION (OUTPATIENT)
Dept: PRIMARY CARE CLINIC | Age: 81
End: 2021-02-23
Payer: MEDICARE

## 2021-02-23 PROCEDURE — 91301 COVID-19, MODERNA VACCINE 100MCG/0.5ML DOSE: CPT | Performed by: FAMILY MEDICINE

## 2021-02-23 PROCEDURE — 0012A COVID-19, MODERNA VACCINE 100MCG/0.5ML DOSE: CPT | Performed by: FAMILY MEDICINE

## 2021-07-13 RX ORDER — LISINOPRIL 10 MG/1
TABLET ORAL
Qty: 90 TABLET | Refills: 0 | Status: SHIPPED | OUTPATIENT
Start: 2021-07-13 | End: 2021-07-20 | Stop reason: SDUPTHER

## 2021-07-13 NOTE — TELEPHONE ENCOUNTER
Varinder Hung called requesting a refill on the following medications:  Requested Prescriptions     Pending Prescriptions Disp Refills    ticagrelor (BRILINTA) 90 MG TABS tablet 180 tablet 3     Sig: Take 1 tablet by mouth 2 times daily     Pharmacy verified:  [email or web address]      Date of last visit: 07-08-20  Date of next visit (if applicable): 3/26/3333

## 2021-07-20 ENCOUNTER — OFFICE VISIT (OUTPATIENT)
Dept: CARDIOLOGY CLINIC | Age: 81
End: 2021-07-20
Payer: MEDICARE

## 2021-07-20 VITALS
WEIGHT: 164.8 LBS | BODY MASS INDEX: 32.35 KG/M2 | HEIGHT: 60 IN | DIASTOLIC BLOOD PRESSURE: 63 MMHG | HEART RATE: 63 BPM | SYSTOLIC BLOOD PRESSURE: 125 MMHG

## 2021-07-20 DIAGNOSIS — Z95.820 S/P ANGIOPLASTY WITH STENT: ICD-10-CM

## 2021-07-20 DIAGNOSIS — I25.10 CORONARY ARTERY DISEASE INVOLVING NATIVE CORONARY ARTERY OF NATIVE HEART WITHOUT ANGINA PECTORIS: Primary | ICD-10-CM

## 2021-07-20 DIAGNOSIS — E78.00 PURE HYPERCHOLESTEROLEMIA: ICD-10-CM

## 2021-07-20 DIAGNOSIS — Z98.890 S/P CARDIAC CATH: ICD-10-CM

## 2021-07-20 DIAGNOSIS — I10 ESSENTIAL HYPERTENSION: ICD-10-CM

## 2021-07-20 PROCEDURE — 1036F TOBACCO NON-USER: CPT | Performed by: INTERNAL MEDICINE

## 2021-07-20 PROCEDURE — 4040F PNEUMOC VAC/ADMIN/RCVD: CPT | Performed by: INTERNAL MEDICINE

## 2021-07-20 PROCEDURE — G8427 DOCREV CUR MEDS BY ELIG CLIN: HCPCS | Performed by: INTERNAL MEDICINE

## 2021-07-20 PROCEDURE — G8400 PT W/DXA NO RESULTS DOC: HCPCS | Performed by: INTERNAL MEDICINE

## 2021-07-20 PROCEDURE — 99214 OFFICE O/P EST MOD 30 MIN: CPT | Performed by: INTERNAL MEDICINE

## 2021-07-20 PROCEDURE — G8417 CALC BMI ABV UP PARAM F/U: HCPCS | Performed by: INTERNAL MEDICINE

## 2021-07-20 PROCEDURE — 1123F ACP DISCUSS/DSCN MKR DOCD: CPT | Performed by: INTERNAL MEDICINE

## 2021-07-20 PROCEDURE — 1090F PRES/ABSN URINE INCON ASSESS: CPT | Performed by: INTERNAL MEDICINE

## 2021-07-20 RX ORDER — ATORVASTATIN CALCIUM 20 MG/1
20 TABLET, FILM COATED ORAL DAILY
Qty: 90 TABLET | Refills: 3 | Status: SHIPPED | OUTPATIENT
Start: 2021-07-20 | End: 2022-05-09 | Stop reason: SDUPTHER

## 2021-07-20 RX ORDER — LISINOPRIL 10 MG/1
TABLET ORAL
Qty: 90 TABLET | Refills: 3 | Status: SHIPPED | OUTPATIENT
Start: 2021-07-20 | End: 2022-05-09 | Stop reason: SDUPTHER

## 2021-07-20 RX ORDER — DILTIAZEM HYDROCHLORIDE 120 MG/1
CAPSULE, COATED, EXTENDED RELEASE ORAL
Qty: 90 CAPSULE | Refills: 3 | Status: SHIPPED | OUTPATIENT
Start: 2021-07-20 | End: 2022-01-25 | Stop reason: SDUPTHER

## 2021-07-20 NOTE — PROGRESS NOTES
Subjective:      Patient ID: Tatiana Grant is a [de-identified] y.o. female. HPI  Chief Complaint   Patient presents with    6 Month Follow-Up    Coronary Artery Disease     Originally  Has some \"dull pain in her Rt breast when she touches it\". NONtender and had Mamogram and was okay   The Chest discomfort on the RT siae last 2 sec once in a while   The chest discomfort is not related to exercise  Awaiting for Barium eval  Has dysphagia at times  Has gotten better in last couple months. She is not sure if it is heartburn. Pt here for 6 months Follow up for CAD      EKG done 1-. Patient denies cp,  Palpitation, dizziness and swelling     Palpitation  resolved after metoprolol    Complains of bruises after bump    NO sob    Feel good    Started on lopressor for HTN and palpit and could not tolerate - hence stop it. The pyrosis got better after nexium 40 mg po qd    Patient Seen, Chart, Consults notes, Labs, Radiology studies reviewed.     [id number] [facility],[address]  Father had stent at age -63's    Patient Active Problem List   Diagnosis    Multinodular goiter    Hyperlipidemia    Hypertension    Abnormal LFTs- may 2014- resolved    GERD (gastroesophageal reflux disease)    Hepatic cyst-under F/u with GI    Heart palpitations in the ears only on waking up in the AM    Sinus tachycardia    S/P cardiac cath- 5/3/17- LM-P, LAD MID 99% STENOSIS , LCX OSTIAL 30% STENOSIS, RCA MILD LUMINAL IRREGULARITIES, EDP 4 , EF 60%, MODERATE SIZED ANTEROLATERAL ANEURYSM, - PCI OF THE MID LAD DONE    S/P angioplasty with stent OF THE MID LAD    Coronary artery disease involving native coronary artery of native heart without angina pectoris    Sinus bradycardia    SOB (shortness of breath) on exertion    Chest pain, atypical       Current Outpatient Medications   Medication Sig Dispense Refill    ticagrelor (BRILINTA) 60 MG TABS tablet Take 1 tablet by mouth 2 times daily 180 tablet 3    metoprolol tartrate (LOPRESSOR) 25 MG tablet Take 1 tablet by mouth 2 times daily 180 tablet 3    lisinopril (PRINIVIL;ZESTRIL) 10 MG tablet Take 1 tablet by mouth once daily 90 tablet 3    dilTIAZem (CARTIA XT) 120 MG extended release capsule Take 1 capsule by mouth once daily 90 capsule 3    atorvastatin (LIPITOR) 20 MG tablet Take 1 tablet by mouth daily 90 tablet 3    nitroGLYCERIN (NITROSTAT) 0.4 MG SL tablet up to max of 3 total doses. If no relief after 3rd dose, call 911. 25 tablet 3    aspirin 81 MG tablet Take 81 mg by mouth daily       Multiple Vitamins-Minerals (MULTIVITAMIN PO) Take 1 tablet by mouth daily.  Calcium Carbonate-Vitamin D (CALCIUM 500 + D PO) Take 1 tablet by mouth daily.  oxybutynin (DITROPAN) 5 MG tablet Take 5 mg by mouth as needed. No current facility-administered medications for this visit. Allergies   Allergen Reactions    Codeine Anxiety        Family History   Problem Relation Age of Onset    Arthritis Mother     Mental Illness Mother     Arthritis Father     High Blood Pressure Father     High Cholesterol Father     Stroke Father     Mental Illness Father         Social History     Socioeconomic History    Marital status:       Spouse name: Not on file    Number of children: Not on file    Years of education: Not on file    Highest education level: Not on file   Occupational History    Not on file   Tobacco Use    Smoking status: Never Smoker    Smokeless tobacco: Never Used   Vaping Use    Vaping Use: Never used   Substance and Sexual Activity    Alcohol use: No     Alcohol/week: 0.0 standard drinks     Comment: 2 drinks per year     Drug use: No    Sexual activity: Never   Other Topics Concern    Not on file   Social History Narrative    Not on file     Social Determinants of Health     Financial Resource Strain:     Difficulty of Paying Living Expenses:    Food Insecurity:     Worried About Running Out of Food in the Last Year:     [address] in the Last Year: Transportation Needs:     Lack of Transportation (Medical):  Lack of Transportation (Non-Medical):    Physical Activity:     Days of Exercise per Week:     Minutes of Exercise per Session:    Stress:     Feeling of Stress :    Social Connections:     Frequency of Communication with Friends and Family:     Frequency of Social Gatherings with Friends and Family:     Attends Sabianism Services:     Active Member of Clubs or Organizations:     Attends Club or Organization Meetings:     Marital Status:    Intimate Partner Violence:     Fear of Current or Ex-Partner:     Emotionally Abused:     Physically Abused:     Sexually Abused:        Blood pressure 125/63, pulse 63, height 5' (1.524 m), weight 164 lb 12.8 oz (74.8 kg), not currently breastfeeding. Review of Systems   Constitutional: Negative. HENT: Negative. Eyes: Negative. Respiratory: Negative. Cardiovascular: Negative. Gastrointestinal: Negative. Genitourinary: Negative. Musculoskeletal: Negative. Skin: Negative. Neurological: Negative. Hematological: Negative. Psychiatric/Behavioral: Negative. Objective:   Physical Exam   [nursing notereviewed. Constitutional: She is oriented to person, place, and time. Vital signs are normal. She appears well-developed and well-nourished. [unfilled]   HENT:   Head: Normocephalic and atraumatic. Right Ear: Hearing and external ear normal.   Left Ear: Hearing and external ear normal.   Nose: Nose normal.   Mouth/Throat: Oropharynx is clear and moist and mucous membranes are normal.   Eyes: Conjunctivae, EOM and lids are normal. Pupils are equal, round, and reactive to light. Right eye exhibits no discharge. Left eye exhibits no discharge. No scleral icterus. Neck: Normal range of motion. Neck supple. No JVD present. No muscular tenderness present. Carotid bruit is not present. No edema and no erythema present. No thyromegaly present.    Cardiovascular: Normal rate, regular rhythm, S1 normal, S2 normal, normal heart sounds, intact distal pulses and normal pulses. No extrasystoles are present. PMI is not displaced. Exam reveals no gallop, no S3, no S4, no distant heart sounds and no friction rub. No murmur heard. Pulmonary/Chest: Effort normal and breath sounds normal. No stridor. No respiratory distress. She has no wheezes. She has no rales. Chest wall is not dull to percussion. She exhibits no mass, no tenderness, no bony tenderness, no crepitus, no edema, no deformity and no swelling. Abdominal: Soft. Normal appearance and bowel sounds are normal. There is no hepatosplenomegaly. No tenderness. She has no CVA tenderness. Musculoskeletal: Normal range of motion. She exhibits no edema and no tenderness. Lymphadenopathy:     She has no cervical adenopathy. She has no axillary adenopathy. Right: No inguinal adenopathy present. Left: No inguinal adenopathy present. Neurological: She is alert and oriented to person, place, and time. No cranial nerve deficit or sensory deficit. She exhibits normal muscle tone. Coordination and gait normal.   Skin: Skin is warm and dry. No abrasion, no bruising and no rash noted. She is not diaphoretic. No cyanosis. No pallor. Nails show no clubbing. Psychiatric: She has a normal mood and affect.  Her speech is normal and behavior is normal. Judgment normal.     No components found for: CHLPL  Lab Results   Component Value Date    TRIG 148 01/29/2021    TRIG 165 06/16/2020    TRIG 209 (H) 06/25/2019     Lab Results   Component Value Date    HDL 50 01/29/2021    HDL 48 06/16/2020    HDL 44 06/25/2019     Lab Results   Component Value Date    LDLCALC 81 01/29/2021    LDLCALC 70 06/16/2020    LDLCALC 60 06/25/2019     No results found for: LABVLDL  Lab Results   Component Value Date    ALT 21 01/29/2021    AST 21 01/29/2021    ALKPHOS 95 01/29/2021    BILITOT 0.5 01/29/2021       Chemistry        Component Value Date/Time     05/04/2017 0552    K 4.0 05/04/2017 0552     05/04/2017 0552    CO2 19 (L) 05/04/2017 0552    BUN 13 05/04/2017 0552    CREATININE 0.4 05/04/2017 0552        Component Value Date/Time    CALCIUM 9.3 05/04/2017 0552    ALKPHOS 95 01/29/2021 0632    AST 21 01/29/2021 0632    ALT 21 01/29/2021 0632    BILITOT 0.5 01/29/2021 3040        Conclusions      Summary   Lexiscan EKG stress test is not suggestive for ischemia. This Nuclear Medicine study was negative for ischemia . Signatures      ----------------------------------------------------------------   Electronically signed by Maxine Montgomery MD (Interpreting   Cardiologist) on 01/27/2021 at 19:39   ----------------------------------------------------------------     Medical History    EKG: NSR, NO ST-T CHANGES,  Stress test nuc negative   ECHO ef 60%    EKG 07/18/13-Sinus  Rhythm WITHIN NORMAL LIMITS    EKG 5/1/14/-Sinus  Rhythm   WITHIN NORMAL LIMITS    Holter  CONCLUSION:   1. Predominantly sinus rhythm. 2. Rare PACs and PVCs. 3. No obvious sustained arrhythmias. Minimum heart rate was 47 beats per minute, maximum   heart rate was 151 beats per minute. Average heart rate was 75 beats per   minute  Fabienne Frye M.D.   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 Coronary artery disease involving native coronary artery of native heart without angina pectoris     2. Pure hypercholesterolemia     3. Essential hypertension     4.  S/P cardiac cath- 5/3/17- LM-P, LAD MID 99% STENOSIS , LCX OSTIAL 30% STENOSIS, RCA MILD LUMINAL IRREGULARITIES, EDP 4 , EF 60%, MODERATE SIZED ANTEROLATERAL ANEURYSM, - PCI OF THE MID LAD DONE 5. S/P angioplasty with stent OF THE MID LAD             Plan:       The Current meds and labs reviewed    Continue the current treatment and with constant vigilance to changes in symptoms and also any potential side effects. Return for care or seek medical attention immediately if symptoms got worse and/or develop new symptoms. Sinus bradycardia- better  Holter  Is okay No significant bradycardia-   cont lopressor  25 po bid      Coronary artery disease, seems to be stable. Denies angina or change in breathing pattern   S/p stent of LAD in 2017  Cont asa and brilinta  Decrease brilinta 60 po bid  lexisc nuc- negative  Pat advised to call or go to ER if any recurrence of cp      Hx of Palpitations- improved with  lopressor 25  po bid-  pat feel good  Palpitation getting better after cardizem cd  Still has palpitation in the ears on waking up only      Hx of ABN LFT- off  lipitor- Repeat LFT- resolved  Tolerate crestor but expensive want to go back to lipitor  Cont  lipitor 20 mg po qd\  LFT repeat after lipitor remains good  Lipid panel and liver function test before next appointment    GERD got better after nexium  Off  nexium 20    Hyperlipidemia: on statins, followed periodically. Patient need periodic lipid and liver profile. Hypertension, on medical treatment. Seems to be under GOOD  control control. Patient is compliant with medical treatment. cont lisinopril to 20 mg po qd  Home BP measurement  sbp in the 135 to 140  And sinus tachy and palpitation in her ears- resolved after lopressor-  Could now tolerate high dose BB  Cont  Cardizem  po qd  Cont lopressor 25 bid      Discussed use, benefit, and side effects of prescribed medications. All patient questions answered. Pt voiced understanding. Instructed to continue current medications, diet and exercise. Continue risk factor modification and medical management. Patient agreed with treatment plan. Follow up as directed.     patient is advised to exercise 30 min s a day three times a week and about weight loss ,balance diet and     More fruits and vegetables .     D/w the pat plan of care and doing well      F/U 6 months    Guero Zhang MD, [facility] [place]

## 2021-10-26 ENCOUNTER — TELEPHONE (OUTPATIENT)
Dept: CARDIOLOGY CLINIC | Age: 81
End: 2021-10-26

## 2021-11-17 ENCOUNTER — HOSPITAL ENCOUNTER (OUTPATIENT)
Age: 81
Discharge: HOME | End: 2021-11-17
Payer: MEDICARE

## 2021-11-17 DIAGNOSIS — I25.10 CORONARY ARTERY DISEASE INVOLVING NATIVE CORONARY ARTERY OF NATIVE HEART WITHOUT ANGINA PECTORIS: ICD-10-CM

## 2021-11-17 DIAGNOSIS — E78.00 PURE HYPERCHOLESTEROLEMIA: ICD-10-CM

## 2021-11-17 DIAGNOSIS — Z95.820 S/P ANGIOPLASTY WITH STENT: ICD-10-CM

## 2021-11-17 DIAGNOSIS — I10 ESSENTIAL HYPERTENSION: ICD-10-CM

## 2021-11-17 DIAGNOSIS — Z98.890 S/P CARDIAC CATH: ICD-10-CM

## 2021-11-17 LAB
ALBUMIN SERPL-MCNC: 4.5 G/DL (ref 3.5–5.1)
ALP BLD-CCNC: 97 U/L (ref 38–126)
ALT SERPL-CCNC: 18 U/L (ref 11–66)
AST SERPL-CCNC: 20 U/L (ref 5–40)
BILIRUB SERPL-MCNC: 0.5 MG/DL (ref 0.3–1.2)
BILIRUBIN DIRECT: < 0.2 MG/DL (ref 0–0.3)
CHOLESTEROL, TOTAL: 152 MG/DL (ref 100–199)
HDLC SERPL-MCNC: 42 MG/DL
LDL CHOLESTEROL CALCULATED: 75 MG/DL
TOTAL PROTEIN: 7.3 G/DL (ref 6.1–8)
TRIGL SERPL-MCNC: 175 MG/DL (ref 0–199)

## 2021-11-17 PROCEDURE — 36415 COLL VENOUS BLD VENIPUNCTURE: CPT

## 2021-11-17 PROCEDURE — 80076 HEPATIC FUNCTION PANEL: CPT

## 2021-11-17 PROCEDURE — 80061 LIPID PANEL: CPT

## 2021-11-23 ENCOUNTER — OFFICE VISIT (OUTPATIENT)
Dept: CARDIOLOGY CLINIC | Age: 81
End: 2021-11-23
Payer: MEDICARE

## 2021-11-23 VITALS
SYSTOLIC BLOOD PRESSURE: 139 MMHG | DIASTOLIC BLOOD PRESSURE: 74 MMHG | HEIGHT: 60 IN | WEIGHT: 166 LBS | BODY MASS INDEX: 32.59 KG/M2 | HEART RATE: 62 BPM

## 2021-11-23 DIAGNOSIS — E78.00 PURE HYPERCHOLESTEROLEMIA: ICD-10-CM

## 2021-11-23 DIAGNOSIS — R06.02 SOB (SHORTNESS OF BREATH) ON EXERTION: ICD-10-CM

## 2021-11-23 DIAGNOSIS — Z01.818 PRE-OP EVALUATION: ICD-10-CM

## 2021-11-23 DIAGNOSIS — I10 PRIMARY HYPERTENSION: ICD-10-CM

## 2021-11-23 DIAGNOSIS — I25.10 CORONARY ARTERY DISEASE INVOLVING NATIVE CORONARY ARTERY OF NATIVE HEART WITHOUT ANGINA PECTORIS: Primary | ICD-10-CM

## 2021-11-23 DIAGNOSIS — Z98.890 S/P CARDIAC CATH: ICD-10-CM

## 2021-11-23 PROBLEM — R07.89 CHEST PAIN, ATYPICAL: Status: RESOLVED | Noted: 2021-01-19 | Resolved: 2021-11-23

## 2021-11-23 PROCEDURE — 1123F ACP DISCUSS/DSCN MKR DOCD: CPT | Performed by: INTERNAL MEDICINE

## 2021-11-23 PROCEDURE — G8427 DOCREV CUR MEDS BY ELIG CLIN: HCPCS | Performed by: INTERNAL MEDICINE

## 2021-11-23 PROCEDURE — G8400 PT W/DXA NO RESULTS DOC: HCPCS | Performed by: INTERNAL MEDICINE

## 2021-11-23 PROCEDURE — 4040F PNEUMOC VAC/ADMIN/RCVD: CPT | Performed by: INTERNAL MEDICINE

## 2021-11-23 PROCEDURE — G8417 CALC BMI ABV UP PARAM F/U: HCPCS | Performed by: INTERNAL MEDICINE

## 2021-11-23 PROCEDURE — 99214 OFFICE O/P EST MOD 30 MIN: CPT | Performed by: INTERNAL MEDICINE

## 2021-11-23 PROCEDURE — 1036F TOBACCO NON-USER: CPT | Performed by: INTERNAL MEDICINE

## 2021-11-23 PROCEDURE — 93000 ELECTROCARDIOGRAM COMPLETE: CPT | Performed by: INTERNAL MEDICINE

## 2021-11-23 PROCEDURE — G8484 FLU IMMUNIZE NO ADMIN: HCPCS | Performed by: INTERNAL MEDICINE

## 2021-11-23 PROCEDURE — 1090F PRES/ABSN URINE INCON ASSESS: CPT | Performed by: INTERNAL MEDICINE

## 2021-11-23 NOTE — PROGRESS NOTES
Subjective:      Patient ID: Brett Hansen is a 80 y.o. female. HPI  Chief Complaint   Patient presents with    Pre-op Exam    Coronary Artery Disease     Originally  Has some \"dull pain in her Rt breast when she touches it\". NONtender and had Mamogram and was okay   The Chest discomfort on the RT siae last 2 sec once in a while   The chest discomfort is not related to exercise  Awaiting for Barium eval  Has dysphagia at times  Has gotten better in last couple months. She is not sure if it is heartburn. 4 MONTH F/U    PRE-OP CLEARANCE FOR RIGHT CARPAL TUNNEL RELEASE AT O 12/2/2021. EKG DONE TODAY. Patient denies cp,  Palpitation, dizziness and swelling     Palpitation  resolved after metoprolol    Complains of bruises after bump    NO sob    Feel good    Started on lopressor for HTN and palpit and could not tolerate - hence stop it. The pyrosis got better after nexium 40 mg po qd    Patient Seen, Chart, Consults notes, Labs, Radiology studies reviewed.     [address] [facility],[address]  Father had stent at age -63's    Patient Active Problem List   Diagnosis    Multinodular goiter    Hyperlipidemia    Hypertension    Abnormal LFTs- may 2014- resolved    GERD (gastroesophageal reflux disease)    Hepatic cyst-under F/u with GI    Heart palpitations in the ears only on waking up in the AM    Sinus tachycardia    S/P cardiac cath- 5/3/17- LM-P, LAD MID 99% STENOSIS , LCX OSTIAL 30% STENOSIS, RCA MILD LUMINAL IRREGULARITIES, EDP 4 , EF 60%, MODERATE SIZED ANTEROLATERAL ANEURYSM, - PCI OF THE MID LAD DONE    S/P angioplasty with stent OF THE MID LAD    Coronary artery disease involving native coronary artery of native heart without angina pectoris    Sinus bradycardia    SOB (shortness of breath) on exertion    Pre-op evaluation FOR CARPAL TUNNEL SURGERY       Current Outpatient Medications   Medication Sig Dispense Refill    metoprolol tartrate (LOPRESSOR) 25 MG tablet Take 1 tablet by mouth twice daily 180 tablet 1    ticagrelor (BRILINTA) 60 MG TABS tablet Take 1 tablet by mouth 2 times daily 180 tablet 3    lisinopril (PRINIVIL;ZESTRIL) 10 MG tablet Take 1 tablet by mouth once daily 90 tablet 3    dilTIAZem (CARTIA XT) 120 MG extended release capsule Take 1 capsule by mouth once daily 90 capsule 3    atorvastatin (LIPITOR) 20 MG tablet Take 1 tablet by mouth daily 90 tablet 3    nitroGLYCERIN (NITROSTAT) 0.4 MG SL tablet up to max of 3 total doses. If no relief after 3rd dose, call 911. 25 tablet 3    aspirin 81 MG tablet Take 81 mg by mouth daily       Multiple Vitamins-Minerals (MULTIVITAMIN PO) Take 1 tablet by mouth daily.  Calcium Carbonate-Vitamin D (CALCIUM 500 + D PO) Take 1 tablet by mouth daily.  oxybutynin (DITROPAN) 5 MG tablet Take 5 mg by mouth as needed. No current facility-administered medications for this visit. Allergies   Allergen Reactions    Codeine Anxiety        Family History   Problem Relation Age of Onset    Arthritis Mother     Mental Illness Mother     Arthritis Father     High Blood Pressure Father     High Cholesterol Father     Stroke Father     Mental Illness Father         Social History     Socioeconomic History    Marital status:       Spouse name: Not on file    Number of children: Not on file    Years of education: Not on file    Highest education level: Not on file   Occupational History    Not on file   Tobacco Use    Smoking status: Never Smoker    Smokeless tobacco: Never Used   Vaping Use    Vaping Use: Never used   Substance and Sexual Activity    Alcohol use: No     Alcohol/week: 0.0 standard drinks     Comment: 2 drinks per year     Drug use: No    Sexual activity: Never   Other Topics Concern    Not on file   Social History Narrative    Not on file     Social Determinants of Health     Financial Resource Strain:     Difficulty of Paying Living Expenses: Not on file   Food Insecurity:     Worried About Running Out of Food in the Last Year: Not on file    Ran Out of Food in the Last Year: Not on file   Transportation Needs:     Lack of Transportation (Medical): Not on file    Lack of Transportation (Non-Medical): Not on file   Physical Activity:     Days of Exercise per Week: Not on file    Minutes of Exercise per Session: Not on file   Stress:     Feeling of Stress : Not on file   Social Connections:     Frequency of Communication with Friends and Family: Not on file    Frequency of Social Gatherings with Friends and Family: Not on file    Attends Sabianism Services: Not on file    Active Member of [address] [place] [address] [employer] or Organizations: Not on file    Attends Club or Organization Meetings: Not on file    Marital Status: Not on file   Intimate Partner Violence:     Fear of Current or Ex-Partner: Not on file    Emotionally Abused: Not on file    Physically Abused: Not on file    Sexually Abused: Not on file   Housing Stability:     Unable to Pay for Housing in the Last Year: Not on file    Number of [place] in the Last Year: Not on file    Unstable Housing in the Last Year: Not on file       Blood pressure 139/74, pulse 62, height 5' (1.524 m), weight 166 lb (75.3 kg), not currently breastfeeding. Review of Systems   Constitutional: Negative. HENT: Negative. Eyes: Negative. Respiratory: Negative. Cardiovascular: Negative. Gastrointestinal: Negative. Genitourinary: Negative. Musculoskeletal: Negative. Skin: Negative. Neurological: Negative. Hematological: Negative. Psychiatric/Behavioral: Negative. Objective:   Physical Exam   [nursing notereviewed. Constitutional: She is oriented to person, place, and time. Vital signs are normal. She appears well-developed and well-nourished. [unfilled]   HENT:   Head: Normocephalic and atraumatic.    Right Ear: Hearing and external ear normal.   Left Ear: Hearing and external ear normal.   Nose: Nose normal.   Mouth/Throat: Oropharynx is clear and moist and mucous membranes are normal.   Eyes: Conjunctivae, EOM and lids are normal. Pupils are equal, round, and reactive to light. Right eye exhibits no discharge. Left eye exhibits no discharge. No scleral icterus. Neck: Normal range of motion. Neck supple. No JVD present. No muscular tenderness present. Carotid bruit is not present. No edema and no erythema present. No thyromegaly present. Cardiovascular: Normal rate, regular rhythm, S1 normal, S2 normal, normal heart sounds, intact distal pulses and normal pulses. No extrasystoles are present. PMI is not displaced. Exam reveals no gallop, no S3, no S4, no distant heart sounds and no friction rub. No murmur heard. Pulmonary/Chest: Effort normal and breath sounds normal. No stridor. No respiratory distress. She has no wheezes. She has no rales. Chest wall is not dull to percussion. She exhibits no mass, no tenderness, no bony tenderness, no crepitus, no edema, no deformity and no swelling. Abdominal: Soft. Normal appearance and bowel sounds are normal. There is no hepatosplenomegaly. No tenderness. She has no CVA tenderness. Musculoskeletal: Normal range of motion. She exhibits no edema and no tenderness. Lymphadenopathy:     She has no cervical adenopathy. She has no axillary adenopathy. Right: No inguinal adenopathy present. Left: No inguinal adenopathy present. Neurological: She is alert and oriented to person, place, and time. No cranial nerve deficit or sensory deficit. She exhibits normal muscle tone. Coordination and gait normal.   Skin: Skin is warm and dry. No abrasion, no bruising and no rash noted. She is not diaphoretic. No cyanosis. No pallor. Nails show no clubbing. Psychiatric: She has a normal mood and affect.  Her speech is normal and behavior is normal. Judgment normal.     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 Coronary artery disease involving native coronary artery of native heart without angina pectoris  EKG 12 Lead    ECHO Complete 2D W Doppler W Color   2. Pre-op evaluation FOR CARPAL TUNNEL SURGERY  ECHO Complete 2D W Doppler W Color   3. Pure hypercholesterolemia     4. Primary hypertension     5. S/P cardiac cath- 5/3/17- LM-P, LAD MID 99% STENOSIS , LCX OSTIAL 30% STENOSIS, RCA MILD LUMINAL IRREGULARITIES, EDP 4 , EF 60%, MODERATE SIZED ANTEROLATERAL ANEURYSM, - PCI OF THE MID LAD DONE     6. SOB (shortness of breath) on exertion  ECHO Complete 2D W Doppler W Color           Plan:       The  MOST Current meds and labs reviewed    Continue the current treatment and with constant vigilance to changes in symptoms and also any potential side effects. Return for care or seek medical attention immediately if symptoms got worse and/or develop new symptoms. Sinus bradycardia- better  Holter  Is okay No significant bradycardia-   cont lopressor  25 po bid      Coronary artery disease, seems to be stable.  Denies angina or change in breathing pattern   S/p stent of LAD in 2017  Cont asa and brilinta  CONT  brilinta 60 po bid  lexisc nuc- negative JAN 2021    PRE OP EVAL  ECHO  MAY PROCEED WITH MOD RISK    Hx of Palpitations- improved with  lopressor 25  po bid-  pat feel good  Palpitation getting better after cardizem cd  Still has palpitation in the ears on waking up only      Hx of ABN LFT- off  lipitor- Repeat LFT- resolved  Tolerate crestor but expensive want to go back to lipitor  Cont  lipitor 20 mg po qd\  LFT repeat after lipitor remains good  Lipid panel and liver function test before next appointment    GERD got better after nexium  Off  nexium 20    Hyperlipidemia: on statins, followed periodically. Patient need periodic lipid and liver profile. Hypertension, on medical treatment. Seems to be under GOOD  control control. Patient is compliant with medical treatment. cont lisinopril to 20 mg po qd  Home BP measurement  sbp in the 135 to 140  And sinus tachy and palpitation in her ears- resolved after lopressor-  Could now tolerate high dose BB  Cont  Cardizem  po qd  Cont lopressor 25 bid    patient is advised to exercise 30 min s a day three times a week and about weight loss ,balance diet and     More fruits and vegetables . D/w the pat plan of care and doing well    Discussed use, benefit, and side effects of prescribed medications. All patient questions answered. Pt voiced understanding. Instructed to continue current medications, diet and exercise. Continue risk factor modification and medical management. Patient agreed with treatment plan. Follow up as directed.     F/U 6 months    Pricila Dill MD, [facility] [place]

## 2021-11-24 ENCOUNTER — HOSPITAL ENCOUNTER (OUTPATIENT)
Dept: NON INVASIVE DIAGNOSTICS | Age: 81
Discharge: HOME | End: 2021-11-24
Payer: MEDICARE

## 2021-11-24 DIAGNOSIS — Z01.818 PRE-OP EVALUATION: ICD-10-CM

## 2021-11-24 DIAGNOSIS — R06.02 SOB (SHORTNESS OF BREATH) ON EXERTION: ICD-10-CM

## 2021-11-24 DIAGNOSIS — I25.10 CORONARY ARTERY DISEASE INVOLVING NATIVE CORONARY ARTERY OF NATIVE HEART WITHOUT ANGINA PECTORIS: ICD-10-CM

## 2021-11-24 LAB
LV EF: 60 %
LVEF MODALITY: NORMAL

## 2021-11-24 PROCEDURE — 93306 TTE W/DOPPLER COMPLETE: CPT

## 2021-12-15 ENCOUNTER — OFFICE VISIT (OUTPATIENT)
Dept: INTERNAL MEDICINE CLINIC | Age: 81
End: 2021-12-15
Payer: MEDICARE

## 2021-12-15 VITALS
TEMPERATURE: 97.8 F | HEIGHT: 60 IN | SYSTOLIC BLOOD PRESSURE: 153 MMHG | DIASTOLIC BLOOD PRESSURE: 76 MMHG | WEIGHT: 164 LBS | HEART RATE: 64 BPM | BODY MASS INDEX: 32.2 KG/M2

## 2021-12-15 DIAGNOSIS — I10 ESSENTIAL HYPERTENSION: ICD-10-CM

## 2021-12-15 DIAGNOSIS — I25.10 ASCVD (ARTERIOSCLEROTIC CARDIOVASCULAR DISEASE): ICD-10-CM

## 2021-12-15 DIAGNOSIS — E04.2 MULTINODULAR GOITER: Primary | ICD-10-CM

## 2021-12-15 PROCEDURE — 99214 OFFICE O/P EST MOD 30 MIN: CPT | Performed by: INTERNAL MEDICINE

## 2021-12-15 PROCEDURE — G8417 CALC BMI ABV UP PARAM F/U: HCPCS | Performed by: INTERNAL MEDICINE

## 2021-12-15 PROCEDURE — G8484 FLU IMMUNIZE NO ADMIN: HCPCS | Performed by: INTERNAL MEDICINE

## 2021-12-15 PROCEDURE — G8400 PT W/DXA NO RESULTS DOC: HCPCS | Performed by: INTERNAL MEDICINE

## 2021-12-15 PROCEDURE — 1123F ACP DISCUSS/DSCN MKR DOCD: CPT | Performed by: INTERNAL MEDICINE

## 2021-12-15 PROCEDURE — 4040F PNEUMOC VAC/ADMIN/RCVD: CPT | Performed by: INTERNAL MEDICINE

## 2021-12-15 PROCEDURE — 1036F TOBACCO NON-USER: CPT | Performed by: INTERNAL MEDICINE

## 2021-12-15 PROCEDURE — G8427 DOCREV CUR MEDS BY ELIG CLIN: HCPCS | Performed by: INTERNAL MEDICINE

## 2021-12-15 PROCEDURE — 1090F PRES/ABSN URINE INCON ASSESS: CPT | Performed by: INTERNAL MEDICINE

## 2021-12-15 NOTE — PROGRESS NOTES
[facility] PROFESSIONAL SERVS  PHYSICIANS [facility]. [facility] [address], [address]  Dept: [phone]  Dept Fax: [phone]      Chief Complaint   Patient presents with    1 Year Follow Up     ASCVD    Goiter    Hypertension       Patient presents for evaluation of multinodular goiter. I saw her 12 months ago  This patient was followed regularly by Dr. Maribel Cartagena. Patient has a multinodular goiter for the past 5 years. She has had several biopsies with benign findings. A biopsy was scheduled last April. Patient said the radiologist told her that he could not find any nodule  She says she feels fine. No major issues  Past Medical History:   Diagnosis Date    Coronary artery disease involving native coronary artery of native heart without angina pectoris 5/5/2017    Gall stones     gall bladder removed in the 1970s    GERD (gastroesophageal reflux disease)     Hyperlipidemia     Hypertension     Osteoporosis        Current Outpatient Medications   Medication Sig Dispense Refill    metoprolol tartrate (LOPRESSOR) 25 MG tablet Take 1 tablet by mouth twice daily 180 tablet 1    ticagrelor (BRILINTA) 60 MG TABS tablet Take 1 tablet by mouth 2 times daily 180 tablet 3    lisinopril (PRINIVIL;ZESTRIL) 10 MG tablet Take 1 tablet by mouth once daily 90 tablet 3    dilTIAZem (CARTIA XT) 120 MG extended release capsule Take 1 capsule by mouth once daily 90 capsule 3    atorvastatin (LIPITOR) 20 MG tablet Take 1 tablet by mouth daily 90 tablet 3    nitroGLYCERIN (NITROSTAT) 0.4 MG SL tablet up to max of 3 total doses. If no relief after 3rd dose, call 911. 25 tablet 3    aspirin 81 MG tablet Take 81 mg by mouth daily       Multiple Vitamins-Minerals (MULTIVITAMIN PO) Take 1 tablet by mouth daily.  Calcium Carbonate-Vitamin D (CALCIUM 500 + D PO) Take 1 tablet by mouth daily.  oxybutynin (DITROPAN) 5 MG tablet Take 5 mg by mouth as needed.        No current facility-administered medications for this visit. Review of Systems - General ROS: negative  Psychological ROS: negative  Hematological and Lymphatic ROS: negative  Respiratory ROS: no cough, shortness of breath, or wheezing  Cardiovascular ROS: no chest pain or dyspnea on exertion  Gastrointestinal ROS: no abdominal pain, change in bowel habits, or black or bloody stools  Genito-Urinary ROS: no dysuria, trouble voiding, or hematuria  Musculoskeletal ROS: negative  Neurological ROS: no TIA or stroke symptoms  Dermatological ROS: negative    Blood pressure (!) 153/76, pulse 64, temperature 97.8 °F (36.6 °C), height 5' (1.524 m), weight 164 lb (74.4 kg), not currently breastfeeding. Physical Examination: General appearance - alert, well appearing, and in no distress  HEENT-head normocephalic, atraumatic  Mental status - alert, oriented to person, place, and time  Neck - supple, no significant adenopathy, no thyromegaly  Chest - clear to auscultation, no wheezes, rales or rhonchi, symmetric air entry  Heart - normal rate, regular rhythm, normal S1, S2, no murmurs, rubs, clicks or gallops  Abdomen - soft, nontender, nondistended, no masses or organomegaly  Neurological - alert, oriented, normal speech, no focal findings or movement disorder noted  Musculoskeletal - no joint tenderness, deformity or swelling  Extremities - peripheral pulses normal, no pedal edema, no clubbing or cyanosis  Skin - normal coloration and turgor, no rashes, no suspicious skin lesions noted      Impression       Stable nodules bilaterally, since at least 4/11/2018. Continued yearly follow-up recommended     Thyroid ultrasound (1/28/20}     Diagnosis Orders   1. Multinodular goiter  US THYROID   2. ASCVD (arteriosclerotic cardiovascular disease)     3. Essential hypertension             thyroid ultrasound shows a multinodular goiter.   Ultrasound January of this year showed all nodules are of  low suspicion for malignancy  I reviewed patient's medications and possible interactions and side effects. I refilled those that were needed.       I told her that I could see her yearly  She has history of ASCVD sees a cardiologist  Had a stent in the LAD

## 2021-12-23 PROBLEM — Z01.818 PRE-OP EVALUATION: Status: RESOLVED | Noted: 2021-11-23 | Resolved: 2021-12-23

## 2022-01-25 RX ORDER — DILTIAZEM HYDROCHLORIDE 120 MG/1
CAPSULE, COATED, EXTENDED RELEASE ORAL
Qty: 90 CAPSULE | Refills: 1 | Status: SHIPPED | OUTPATIENT
Start: 2022-01-25 | End: 2022-05-09 | Stop reason: SDUPTHER

## 2022-01-25 NOTE — TELEPHONE ENCOUNTER
Nir Nunez called requesting a refill on the following medications:  Requested Prescriptions     Pending Prescriptions Disp Refills    dilTIAZem (CARTIA XT) 120 MG extended release capsule 90 capsule 3     Sig: Take 1 capsule by mouth once daily     Pharmacy verified:  .pv  [address] [phone]    Date of last visit: 11/23/2021  Date of next visit (if applicable): 9/7/6278

## 2022-04-15 ENCOUNTER — HOSPITAL ENCOUNTER (OUTPATIENT)
Dept: ULTRASOUND IMAGING | Age: 82
Discharge: HOME | End: 2022-04-15
Payer: MEDICARE

## 2022-04-15 DIAGNOSIS — E04.2 MULTINODULAR GOITER: ICD-10-CM

## 2022-04-15 PROCEDURE — 76536 US EXAM OF HEAD AND NECK: CPT

## 2022-05-09 ENCOUNTER — OFFICE VISIT (OUTPATIENT)
Dept: CARDIOLOGY CLINIC | Age: 82
End: 2022-05-09
Payer: MEDICARE

## 2022-05-09 VITALS
DIASTOLIC BLOOD PRESSURE: 83 MMHG | HEIGHT: 60 IN | HEART RATE: 63 BPM | BODY MASS INDEX: 32.47 KG/M2 | SYSTOLIC BLOOD PRESSURE: 147 MMHG | WEIGHT: 165.4 LBS

## 2022-05-09 DIAGNOSIS — Z95.820 S/P ANGIOPLASTY WITH STENT: ICD-10-CM

## 2022-05-09 DIAGNOSIS — R06.02 SOB (SHORTNESS OF BREATH) ON EXERTION: ICD-10-CM

## 2022-05-09 DIAGNOSIS — I25.10 CORONARY ARTERY DISEASE INVOLVING NATIVE CORONARY ARTERY OF NATIVE HEART WITHOUT ANGINA PECTORIS: Primary | ICD-10-CM

## 2022-05-09 DIAGNOSIS — Z98.890 S/P CARDIAC CATH: ICD-10-CM

## 2022-05-09 DIAGNOSIS — E78.00 PURE HYPERCHOLESTEROLEMIA: ICD-10-CM

## 2022-05-09 DIAGNOSIS — I10 PRIMARY HYPERTENSION: ICD-10-CM

## 2022-05-09 PROCEDURE — G8417 CALC BMI ABV UP PARAM F/U: HCPCS | Performed by: INTERNAL MEDICINE

## 2022-05-09 PROCEDURE — 1090F PRES/ABSN URINE INCON ASSESS: CPT | Performed by: INTERNAL MEDICINE

## 2022-05-09 PROCEDURE — 99214 OFFICE O/P EST MOD 30 MIN: CPT | Performed by: INTERNAL MEDICINE

## 2022-05-09 PROCEDURE — G8427 DOCREV CUR MEDS BY ELIG CLIN: HCPCS | Performed by: INTERNAL MEDICINE

## 2022-05-09 PROCEDURE — G8400 PT W/DXA NO RESULTS DOC: HCPCS | Performed by: INTERNAL MEDICINE

## 2022-05-09 PROCEDURE — 1036F TOBACCO NON-USER: CPT | Performed by: INTERNAL MEDICINE

## 2022-05-09 PROCEDURE — 1123F ACP DISCUSS/DSCN MKR DOCD: CPT | Performed by: INTERNAL MEDICINE

## 2022-05-09 PROCEDURE — 4040F PNEUMOC VAC/ADMIN/RCVD: CPT | Performed by: INTERNAL MEDICINE

## 2022-05-09 RX ORDER — ATORVASTATIN CALCIUM 20 MG/1
20 TABLET, FILM COATED ORAL DAILY
Qty: 90 TABLET | Refills: 3 | Status: SHIPPED | OUTPATIENT
Start: 2022-05-09

## 2022-05-09 RX ORDER — NITROGLYCERIN 0.4 MG/1
TABLET SUBLINGUAL
Qty: 25 TABLET | Refills: 3 | Status: SHIPPED | OUTPATIENT
Start: 2022-05-09

## 2022-05-09 RX ORDER — DILTIAZEM HYDROCHLORIDE 120 MG/1
CAPSULE, COATED, EXTENDED RELEASE ORAL
Qty: 90 CAPSULE | Refills: 3 | Status: SHIPPED | OUTPATIENT
Start: 2022-05-09

## 2022-05-09 RX ORDER — LISINOPRIL 10 MG/1
TABLET ORAL
Qty: 90 TABLET | Refills: 3 | Status: SHIPPED | OUTPATIENT
Start: 2022-05-09

## 2022-05-09 NOTE — PROGRESS NOTES
Subjective:      Patient ID: Martha Sales is a 80 y.o. female. HPI  Chief Complaint   Patient presents with    6 Month Follow-Up    Coronary Artery Disease    Check-Up     Originally  Has some \"dull pain in her Rt breast when she touches it\". NONtender and had Mamogram and was okay   The Chest discomfort on the RT siae last 2 sec once in a while   The chest discomfort is not related to exercise  Awaiting for Barium eval  Has dysphagia at times  Has gotten better in last couple months. She is not sure if it is heartburn. Pt here for a 6 month f/u    EKG done 11-23-21    Patient denies cp,  Palpitation, dizziness and swelling     Palpitation  resolved after metoprolol    Complains of bruises after bump    NO sob    Feel good    Started on lopressor for HTN and palpit and could not tolerate - hence stop it. The pyrosis got better after nexium 40 mg po qd    Patient Seen, Chart, Consults notes, Labs, Radiology studies reviewed.     [address] [facility],[address]  Father had stent at age -63's    Patient Active Problem List   Diagnosis    Multinodular goiter    Hyperlipidemia    Hypertension    Abnormal LFTs- may 2014- resolved    GERD (gastroesophageal reflux disease)    Hepatic cyst-under F/u with GI    Heart palpitations in the ears only on waking up in the AM    Sinus tachycardia    S/P cardiac cath- 5/3/17- LM-P, LAD MID 99% STENOSIS , LCX OSTIAL 30% STENOSIS, RCA MILD LUMINAL IRREGULARITIES, EDP 4 , EF 60%, MODERATE SIZED ANTEROLATERAL ANEURYSM, - PCI OF THE MID LAD DONE    S/P angioplasty with stent OF THE MID LAD    Coronary artery disease involving native coronary artery of native heart without angina pectoris    Sinus bradycardia    SOB (shortness of breath) on exertion       Current Outpatient Medications   Medication Sig Dispense Refill    dilTIAZem (CARTIA XT) 120 MG extended release capsule Take 1 capsule by mouth once daily 90 capsule 3    metoprolol tartrate (LOPRESSOR) 25 MG tablet Take 1 tablet by mouth twice daily 180 tablet 3    ticagrelor (BRILINTA) 60 MG TABS tablet Take 1 tablet by mouth 2 times daily 180 tablet 3    lisinopril (PRINIVIL;ZESTRIL) 10 MG tablet Take 1 tablet by mouth once daily 90 tablet 3    atorvastatin (LIPITOR) 20 MG tablet Take 1 tablet by mouth daily 90 tablet 3    nitroGLYCERIN (NITROSTAT) 0.4 MG SL tablet up to max of 3 total doses. If no relief after 3rd dose, call 911. 25 tablet 3    aspirin 81 MG tablet Take 81 mg by mouth daily       Multiple Vitamins-Minerals (MULTIVITAMIN PO) Take 1 tablet by mouth daily.  Calcium Carbonate-Vitamin D (CALCIUM 500 + D PO) Take 1 tablet by mouth daily.  oxybutynin (DITROPAN) 5 MG tablet Take 5 mg by mouth as needed. No current facility-administered medications for this visit. Allergies   Allergen Reactions    Codeine Anxiety        Family History   Problem Relation Age of Onset    Arthritis Mother     Mental Illness Mother     Arthritis Father     High Blood Pressure Father     High Cholesterol Father     Stroke Father     Mental Illness Father         Social History     Socioeconomic History    Marital status:       Spouse name: Not on file    Number of children: Not on file    Years of education: Not on file    Highest education level: Not on file   Occupational History    Not on file   Tobacco Use    Smoking status: Never Smoker    Smokeless tobacco: Never Used   Vaping Use    Vaping Use: Never used   Substance and Sexual Activity    Alcohol use: No     Alcohol/week: 0.0 standard drinks     Comment: 2 drinks per year     Drug use: No    Sexual activity: Never   Other Topics Concern    Not on file   Social History Narrative    Not on file     Social Determinants of Health     Financial Resource Strain:     Difficulty of Paying Living Expenses: Not on file   Food Insecurity:     Worried About Running Out of Food in the Last Year: Not on file    Tobais of Food in the Last Year: Not on file   Transportation Needs:     Lack of Transportation (Medical): Not on file    Lack of Transportation (Non-Medical): Not on file   Physical Activity:     Days of Exercise per Week: Not on file    Minutes of Exercise per Session: Not on file   Stress:     Feeling of Stress : Not on file   Social Connections:     Frequency of Communication with Friends and Family: Not on file    Frequency of Social Gatherings with Friends and Family: Not on file    Attends [employer] Services: Not on file    Active Member of [address] [place] [address] [employer] or Organizations: Not on file    Attends Club or Organization Meetings: Not on file    Marital Status: Not on file   Intimate Partner Violence:     Fear of Current or Ex-Partner: Not on file    Emotionally Abused: Not on file    Physically Abused: Not on file    Sexually Abused: Not on file   Housing Stability:     Unable to Pay for Housing in the Last Year: Not on file    Number of [place] in the Last Year: Not on file    Unstable Housing in the Last Year: Not on file       Blood pressure (!) 151/75, pulse 68, height 5' (1.524 m), weight 165 lb 6.4 oz (75 kg), not currently breastfeeding. Review of Systems   Constitutional: Negative. HENT: Negative. Eyes: Negative. Respiratory: Negative. Cardiovascular: Negative. Gastrointestinal: Negative. Genitourinary: Negative. Musculoskeletal: Negative. Skin: Negative. Neurological: Negative. Hematological: Negative. Psychiatric/Behavioral: Negative. Objective:   Physical Exam   [nursing notereviewed. Constitutional: She is oriented to person, place, and time. Vital signs are normal. She appears well-developed and well-nourished. [unfilled]   HENT:   Head: Normocephalic and atraumatic.    Right Ear: Hearing and external ear normal.   Left Ear: Hearing and external ear normal.   Nose: Nose normal.   Mouth/Throat: Oropharynx is clear and moist and mucous membranes are normal.   Eyes: Conjunctivae, EOM and lids are normal. Pupils are equal, round, and reactive to light. Right eye exhibits no discharge. Left eye exhibits no discharge. No scleral icterus. Neck: Normal range of motion. Neck supple. No JVD present. No muscular tenderness present. Carotid bruit is not present. No edema and no erythema present. No thyromegaly present. Cardiovascular: Normal rate, regular rhythm, S1 normal, S2 normal, normal heart sounds, intact distal pulses and normal pulses. No extrasystoles are present. PMI is not displaced. Exam reveals no gallop, no S3, no S4, no distant heart sounds and no friction rub. No murmur heard. Pulmonary/Chest: Effort normal and breath sounds normal. No stridor. No respiratory distress. She has no wheezes. She has no rales. Chest wall is not dull to percussion. She exhibits no mass, no tenderness, no bony tenderness, no crepitus, no edema, no deformity and no swelling. Abdominal: Soft. Normal appearance and bowel sounds are normal. There is no hepatosplenomegaly. No tenderness. She has no CVA tenderness. Musculoskeletal: Normal range of motion. She exhibits no edema and no tenderness. Lymphadenopathy:     She has no cervical adenopathy. She has no axillary adenopathy. Right: No inguinal adenopathy present. Left: No inguinal adenopathy present. Neurological: She is alert and oriented to person, place, and time. No cranial nerve deficit or sensory deficit. She exhibits normal muscle tone. Coordination and gait normal.   Skin: Skin is warm and dry. No abrasion, no bruising and no rash noted. She is not diaphoretic. No cyanosis. No pallor. Nails show no clubbing. Psychiatric: She has a normal mood and affect.  Her speech is normal and behavior is normal. Judgment normal.     No components found for: CHLPL  Lab Results   Component Value Date    TRIG 175 11/17/2021    TRIG 148 01/29/2021    TRIG 165 06/16/2020     Lab Results   Component Value Date    HDL 42 11/17/2021 HDL 50 01/29/2021    HDL 48 06/16/2020     Lab Results   Component Value Date    LDLCALC 75 11/17/2021    LDLCALC 81 01/29/2021    LDLCALC 70 06/16/2020     No results found for: LABVLDL  Lab Results   Component Value Date    ALT 18 11/17/2021    AST 20 11/17/2021    ALKPHOS 97 11/17/2021    BILITOT 0.5 11/17/2021       Chemistry        Component Value Date/Time     05/04/2017 0552    K 4.0 05/04/2017 0552     05/04/2017 0552    CO2 19 (L) 05/04/2017 0552    BUN 13 05/04/2017 0552    CREATININE 0.4 05/04/2017 0552        Component Value Date/Time    CALCIUM 9.3 05/04/2017 0552    ALKPHOS 97 11/17/2021 0641    AST 20 11/17/2021 0641    ALT 18 11/17/2021 0641    BILITOT 0.5 11/17/2021 0641        Conclusions      Summary   Lexiscan EKG stress test is not suggestive for ischemia. This Nuclear Medicine study was negative for ischemia . Signatures      ----------------------------------------------------------------   Electronically signed by Shauna Long MD (Interpreting   Cardiologist) on 01/27/2021 at 19:39   ----------------------------------------------------------------     Medical History    EKG: NSR, NO ST-T CHANGES,  Stress test nuc negative   ECHO ef 60%    EKG 07/18/13-Sinus  Rhythm WITHIN NORMAL LIMITS    EKG 5/1/14/-Sinus  Rhythm   WITHIN NORMAL LIMITS    Holter  CONCLUSION:   1. Predominantly sinus rhythm. 2. Rare PACs and PVCs. 3. No obvious sustained arrhythmias. Minimum heart rate was 47 beats per minute, maximum   heart rate was 151 beats per minute. Average heart rate was 75 beats per   minute  Marge Hinds M.D.   D: 05/15/2014 13:02 T: 05/15/2014 13:25 ks     ECHO EF 60% 2014         Conclusions      Summary   Lexiscan EKG stress test is not suggestive for ischemia. This Nuclear Medicine study was negative for ischemia .       Signatures      ----------------------------------------------------------------   Electronically signed by Shauna Long MD (Interpreting Cardiologist) on 01/27/2021 at 19:39   ----------------------------------------------------------------           EKG 12/11/15  Sinus  Rhythm   WITHIN NORMAL LIMITS    EKG 7/9/19  NSR, NO acute abn       ekg 7/8/2020  Sinus  Bradycardia   -Old anterior infarct. 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 Coronary artery disease involving native coronary artery of native heart without angina pectoris     2. Pure hypercholesterolemia     3. Primary hypertension     4. S/P angioplasty with stent OF THE MID LAD     5. S/P cardiac cath- 5/3/17- LM-P, LAD MID 99% STENOSIS , LCX OSTIAL 30% STENOSIS, RCA MILD LUMINAL IRREGULARITIES, EDP 4 , EF 60%, MODERATE SIZED ANTEROLATERAL ANEURYSM, - PCI OF THE MID LAD DONE     6. SOB (shortness of breath) on exertion             Plan:       The   Current meds and labs reviewed    Continue the current treatment and with constant vigilance to changes in symptoms and also any potential side effects. Return for care or seek medical attention immediately if symptoms got worse and/or develop new symptoms. Sinus bradycardia- better  Holter  Is okay No significant bradycardia-   cont lopressor  25 po bid    Coronary artery disease, seems to be stable.  Denies angina or change in breathing pattern   S/p stent of LAD in 2017  Cont asa and brilinta  CONT  brilinta 60 po bid  lexisc nuc- negative JAN 2021    Hx of Palpitations- improved with  lopressor 25  po bid-  pat feel good  Palpitation getting better after cardizem cd  Still has palpitation in the ears on waking up only      Hx of ABN LFT- off  lipitor- Repeat LFT- resolved  Tolerate crestor but expensive want to go back to lipitor  Cont  lipitor 20 mg po qd\  LFT repeat after lipitor remains good  Lipid panel and liver function test before next appointment    GERD got better after nexium  Off  nexium 20    Hyperlipidemia: on statins, followed periodically. Patient need periodic lipid and liver profile. Hypertension, on medical treatment. Seems to be under GOOD  control control. Patient is compliant with medical treatment. cont lisinopril to 20 mg po qd  Home BP measurement  sbp in the 135 to 140  And sinus tachy and palpitation in her ears- resolved after lopressor-  Could now tolerate high dose BB  Cont  Cardizem  po qd  Cont lopressor 25 bid    patient is advised to exercise 30 min s a day three times a week and about weight loss ,balance diet and     More fruits and vegetables . D/w the pat plan of care    Romeo Christine is stable and doing well    Discussed use, benefit, and side effects of prescribed medications. All patient questions answered. Pt voiced understanding. Instructed to continue current medications, diet and exercise. Continue risk factor modification and medical management. Patient agreed with treatment plan. Follow up as directed.     F/U 6 months    Chelsey Michelle MD, [facility] [place]

## 2022-08-02 RX ORDER — DILTIAZEM HYDROCHLORIDE 120 MG/1
CAPSULE, COATED, EXTENDED RELEASE ORAL
Qty: 90 CAPSULE | Refills: 0 | OUTPATIENT
Start: 2022-08-02

## 2022-09-22 ENCOUNTER — TELEPHONE (OUTPATIENT)
Dept: CARDIOLOGY CLINIC | Age: 82
End: 2022-09-22

## 2022-09-22 NOTE — TELEPHONE ENCOUNTER
Pt needs 1 tooth extracted, asking if ok to do so or do you need to see pt first. Pt states she is feeling fine and need that tooth  taken care of.?

## 2022-09-22 NOTE — TELEPHONE ENCOUNTER
LM for patient to call office back. Patient scheduled for 11-7-22. Needs cardiac clearance. Does patient want to be seen sooner? Area L Indication Text: Tumors in this location are included in Area L (trunk and extremities).  Mohs surgery is indicated for larger tumors, 2 cm or larger, in these anatomic locations.

## 2022-09-23 ENCOUNTER — HOSPITAL ENCOUNTER (OUTPATIENT)
Age: 82
Discharge: HOME | End: 2022-09-23
Payer: MEDICARE

## 2022-09-23 LAB
ALBUMIN SERPL-MCNC: 4.4 G/DL (ref 3.5–5.1)
ALP BLD-CCNC: 90 U/L (ref 38–126)
ALT SERPL-CCNC: 19 U/L (ref 11–66)
AST SERPL-CCNC: 23 U/L (ref 5–40)
BILIRUB SERPL-MCNC: 0.4 MG/DL (ref 0.3–1.2)
BILIRUBIN DIRECT: < 0.2 MG/DL (ref 0–0.3)
CHOLESTEROL, TOTAL: 151 MG/DL (ref 100–199)
HDLC SERPL-MCNC: 48 MG/DL
LDL CHOLESTEROL CALCULATED: 75 MG/DL
TOTAL PROTEIN: 6.8 G/DL (ref 6.1–8)
TRIGL SERPL-MCNC: 142 MG/DL (ref 0–199)

## 2022-09-23 PROCEDURE — 36415 COLL VENOUS BLD VENIPUNCTURE: CPT

## 2022-09-23 PROCEDURE — 80076 HEPATIC FUNCTION PANEL: CPT

## 2022-09-23 PROCEDURE — 80061 LIPID PANEL: CPT

## 2022-09-26 NOTE — TELEPHONE ENCOUNTER
May proceed for tooth extr- low risk  May  hold brilinta for 5 days  Cont asa 81 unless surgeon insist

## 2022-11-07 ENCOUNTER — OFFICE VISIT (OUTPATIENT)
Dept: CARDIOLOGY CLINIC | Age: 82
End: 2022-11-07
Payer: MEDICARE

## 2022-11-07 VITALS
HEART RATE: 59 BPM | SYSTOLIC BLOOD PRESSURE: 146 MMHG | WEIGHT: 162.2 LBS | BODY MASS INDEX: 31.84 KG/M2 | HEIGHT: 60 IN | DIASTOLIC BLOOD PRESSURE: 63 MMHG

## 2022-11-07 DIAGNOSIS — E78.00 PURE HYPERCHOLESTEROLEMIA: ICD-10-CM

## 2022-11-07 DIAGNOSIS — Z98.890 S/P CARDIAC CATH: ICD-10-CM

## 2022-11-07 DIAGNOSIS — Z95.820 S/P ANGIOPLASTY WITH STENT: ICD-10-CM

## 2022-11-07 DIAGNOSIS — I25.10 CORONARY ARTERY DISEASE INVOLVING NATIVE CORONARY ARTERY OF NATIVE HEART WITHOUT ANGINA PECTORIS: Primary | ICD-10-CM

## 2022-11-07 DIAGNOSIS — I10 PRIMARY HYPERTENSION: ICD-10-CM

## 2022-11-07 PROCEDURE — G8427 DOCREV CUR MEDS BY ELIG CLIN: HCPCS | Performed by: INTERNAL MEDICINE

## 2022-11-07 PROCEDURE — 1036F TOBACCO NON-USER: CPT | Performed by: INTERNAL MEDICINE

## 2022-11-07 PROCEDURE — G8417 CALC BMI ABV UP PARAM F/U: HCPCS | Performed by: INTERNAL MEDICINE

## 2022-11-07 PROCEDURE — 1123F ACP DISCUSS/DSCN MKR DOCD: CPT | Performed by: INTERNAL MEDICINE

## 2022-11-07 PROCEDURE — 3074F SYST BP LT 130 MM HG: CPT | Performed by: INTERNAL MEDICINE

## 2022-11-07 PROCEDURE — 99214 OFFICE O/P EST MOD 30 MIN: CPT | Performed by: INTERNAL MEDICINE

## 2022-11-07 PROCEDURE — 1090F PRES/ABSN URINE INCON ASSESS: CPT | Performed by: INTERNAL MEDICINE

## 2022-11-07 PROCEDURE — G8400 PT W/DXA NO RESULTS DOC: HCPCS | Performed by: INTERNAL MEDICINE

## 2022-11-07 PROCEDURE — 3078F DIAST BP <80 MM HG: CPT | Performed by: INTERNAL MEDICINE

## 2022-11-07 PROCEDURE — G8484 FLU IMMUNIZE NO ADMIN: HCPCS | Performed by: INTERNAL MEDICINE

## 2022-11-07 RX ORDER — DILTIAZEM HYDROCHLORIDE 120 MG/1
CAPSULE, COATED, EXTENDED RELEASE ORAL
Qty: 90 CAPSULE | Refills: 3 | Status: SHIPPED | OUTPATIENT
Start: 2022-11-07

## 2022-11-07 RX ORDER — ATORVASTATIN CALCIUM 20 MG/1
20 TABLET, FILM COATED ORAL DAILY
Qty: 90 TABLET | Refills: 3 | Status: SHIPPED | OUTPATIENT
Start: 2022-11-07

## 2022-11-07 RX ORDER — LISINOPRIL 10 MG/1
TABLET ORAL
Qty: 90 TABLET | Refills: 3 | Status: SHIPPED | OUTPATIENT
Start: 2022-11-07

## 2022-11-07 NOTE — PROGRESS NOTES
Subjective:      Patient ID: Vinita Corral is a 80 y.o. female. HPI  Chief Complaint   Patient presents with    6 Month Follow-Up    Coronary Artery Disease     Originally  Has some \"dull pain in her Rt breast when she touches it\". NONtender and had Mamogram and was okay   The Chest discomfort on the RT siae last 2 sec once in a while   The chest discomfort is not related to exercise  Awaiting for Barium eval  Has dysphagia at times  Has gotten better in last couple months. She is not sure if it is heartburn. [facility]    EKG DONE 11-    Patient denies cp,  Palpitation, dizziness and swelling     Palpitation  resolved after metoprolol    Complains of bruises after bump    NO sob    Feel good    Started on lopressor for HTN and palpit and could not tolerate - hence stop it. The pyrosis got better after nexium 40 mg po qd    Patient Seen, Chart, Consults notes, Labs, Radiology studies reviewed.     [id number] [facility],[address]  Father had stent at age -63's    Patient Active Problem List   Diagnosis    Multinodular goiter    Hyperlipidemia    Hypertension    Abnormal LFTs- may 2014- resolved    GERD (gastroesophageal reflux disease)    Hepatic cyst-under F/u with GI    Heart palpitations in the ears only on waking up in the AM    Sinus tachycardia    S/P cardiac cath- 5/3/17- LM-P, LAD MID 99% STENOSIS , LCX OSTIAL 30% STENOSIS, RCA MILD LUMINAL IRREGULARITIES, EDP 4 , EF 60%, MODERATE SIZED ANTEROLATERAL ANEURYSM, - PCI OF THE MID LAD DONE    S/P angioplasty with stent OF THE MID LAD    Coronary artery disease involving native coronary artery of native heart without angina pectoris    Sinus bradycardia    SOB (shortness of breath) on exertion       Current Outpatient Medications   Medication Sig Dispense Refill    atorvastatin (LIPITOR) 20 MG tablet Take 1 tablet by mouth daily 90 tablet 3    dilTIAZem (CARTIA XT) 120 MG extended release capsule Take 1 capsule by mouth once daily 90 capsule 3    lisinopril (PRINIVIL;ZESTRIL) 10 MG tablet Take 1 tablet by mouth once daily 90 tablet 3    metoprolol tartrate (LOPRESSOR) 25 MG tablet Take 1 tablet by mouth twice daily 180 tablet 3    ticagrelor (BRILINTA) 60 MG TABS tablet Take 1 tablet by mouth 2 times daily 180 tablet 3    nitroGLYCERIN (NITROSTAT) 0.4 MG SL tablet up to max of 3 total doses. If no relief after 3rd dose, call 911. 25 tablet 3    aspirin 81 MG tablet Take 81 mg by mouth daily       Multiple Vitamins-Minerals (MULTIVITAMIN PO) Take 1 tablet by mouth daily. Calcium Carbonate-Vitamin D (CALCIUM 500 + D PO) Take 1 tablet by mouth daily. oxybutynin (DITROPAN) 5 MG tablet Take 5 mg by mouth as needed. No current facility-administered medications for this visit. Allergies   Allergen Reactions    Codeine Anxiety        Family History   Problem Relation Age of Onset    Arthritis Mother     Mental Illness Mother     Arthritis Father     High Blood Pressure Father     High Cholesterol Father     Stroke Father     Mental Illness Father         Social History     Socioeconomic History    Marital status:       Spouse name: Not on file    Number of children: Not on file    Years of education: Not on file    Highest education level: Not on file   Occupational History    Not on file   Tobacco Use    Smoking status: Never    Smokeless tobacco: Never   Vaping Use    Vaping Use: Never used   Substance and Sexual Activity    Alcohol use: No     Alcohol/week: 0.0 standard drinks     Comment: 2 drinks per year     Drug use: No    Sexual activity: Never   Other Topics Concern    Not on file   Social History Narrative    Not on file     Social Determinants of Health     Financial Resource Strain: Not on file   Food Insecurity: Not on file   Transportation Needs: Not on file   Physical Activity: Not on file   Stress: Not on file   Social Connections: Not on file   Intimate Partner Violence: Not on file   Housing Stability: Not on file       Blood pressure (!) 146/63, pulse 59, height 5' (1.524 m), weight 162 lb 3.2 oz (73.6 kg), not currently breastfeeding. Review of Systems   Constitutional: Negative. HENT: Negative. Eyes: Negative. Respiratory: Negative. Cardiovascular: Negative. Gastrointestinal: Negative. Genitourinary: Negative. Musculoskeletal: Negative. Skin: Negative. Neurological: Negative. Hematological: Negative. Psychiatric/Behavioral: Negative. Objective:   Physical Exam   [nursing notereviewed. Constitutional: She is oriented to person, place, and time. Vital signs are normal. She appears well-developed and well-nourished. [unfilled]   HENT:   Head: Normocephalic and atraumatic. Right Ear: Hearing and external ear normal.   Left Ear: Hearing and external ear normal.   Nose: Nose normal.   Mouth/Throat: Oropharynx is clear and moist and mucous membranes are normal.   Eyes: Conjunctivae, EOM and lids are normal. Pupils are equal, round, and reactive to light. Right eye exhibits no discharge. Left eye exhibits no discharge. No scleral icterus. Neck: Normal range of motion. Neck supple. No JVD present. No muscular tenderness present. Carotid bruit is not present. No edema and no erythema present. No thyromegaly present. Cardiovascular: Normal rate, regular rhythm, S1 normal, S2 normal, normal heart sounds, intact distal pulses and normal pulses. No extrasystoles are present. PMI is not displaced. Exam reveals no gallop, no S3, no S4, no distant heart sounds and no friction rub. No murmur heard. Pulmonary/Chest: Effort normal and breath sounds normal. No stridor. No respiratory distress. She has no wheezes. She has no rales. Chest wall is not dull to percussion. She exhibits no mass, no tenderness, no bony tenderness, no crepitus, no edema, no deformity and no swelling. Abdominal: Soft. Normal appearance and bowel sounds are normal. There is no hepatosplenomegaly. No tenderness.  She has no CVA tenderness. Musculoskeletal: Normal range of motion. She exhibits no edema and no tenderness. Lymphadenopathy:     She has no cervical adenopathy. She has no axillary adenopathy. Right: No inguinal adenopathy present. Left: No inguinal adenopathy present. Neurological: She is alert and oriented to person, place, and time. No cranial nerve deficit or sensory deficit. She exhibits normal muscle tone. Coordination and gait normal.   Skin: Skin is warm and dry. No abrasion, no bruising and no rash noted. She is not diaphoretic. No cyanosis. No pallor. Nails show no clubbing. Psychiatric: She has a normal mood and affect. Her speech is normal and behavior is normal. Judgment normal.     No components found for: CHLPL  Lab Results   Component Value Date    TRIG 142 09/23/2022    TRIG 175 11/17/2021    TRIG 148 01/29/2021     Lab Results   Component Value Date    HDL 48 09/23/2022    HDL 42 11/17/2021    HDL 50 01/29/2021     Lab Results   Component Value Date    LDLCALC 75 09/23/2022    LDLCALC 75 11/17/2021    LDLCALC 81 01/29/2021     No results found for: LABVLDL  Lab Results   Component Value Date    ALT 19 09/23/2022    AST 23 09/23/2022    ALKPHOS 90 09/23/2022    BILITOT 0.4 09/23/2022       Chemistry        Component Value Date/Time     05/04/2017 0552    K 4.0 05/04/2017 0552     05/04/2017 0552    CO2 19 (L) 05/04/2017 0552    BUN 13 05/04/2017 0552    CREATININE 0.4 05/04/2017 0552        Component Value Date/Time    CALCIUM 9.3 05/04/2017 0552    ALKPHOS 90 09/23/2022 0701    AST 23 09/23/2022 0701    ALT 19 09/23/2022 0701    BILITOT 0.4 09/23/2022 0701        Conclusions      Summary   Lexiscan EKG stress test is not suggestive for ischemia. This Nuclear Medicine study was negative for ischemia .       Signatures      ----------------------------------------------------------------   Electronically signed by Elbert Vera MD (Interpreting   Cardiologist) on 01/27/2021 at 19:39   ----------------------------------------------------------------     Medical History    EKG: NSR, NO ST-T CHANGES,  Stress test nuc negative   ECHO ef 60%    EKG 07/18/13-Sinus  Rhythm WITHIN NORMAL LIMITS    EKG 5/1/14/-Sinus  Rhythm   WITHIN NORMAL LIMITS    Holter  CONCLUSION:   1. Predominantly sinus rhythm. 2. Rare PACs and PVCs. 3. No obvious sustained arrhythmias. Minimum heart rate was 47 beats per minute, maximum   heart rate was 151 beats per minute. Average heart rate was 75 beats per   minute  Maryann Buitrago M.D.   D: 05/15/2014 13:02 T: 05/15/2014 13:25 ks     ECHO EF 60% 2014         Conclusions      Summary   Lexiscan EKG stress test is not suggestive for ischemia. This Nuclear Medicine study was negative for ischemia . Signatures      ----------------------------------------------------------------   Electronically signed by Tona Alaniz MD (Interpreting   Cardiologist) on 01/27/2021 at 19:39   ----------------------------------------------------------------           EKG 12/11/15  Sinus  Rhythm   WITHIN NORMAL LIMITS    EKG 7/9/19  NSR, NO acute abn       ekg 7/8/2020  Sinus  Bradycardia   -Old anterior infarct. 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 Coronary artery disease involving native coronary artery of native heart without angina pectoris        2. Pure hypercholesterolemia        3. Primary hypertension        4. S/P cardiac cath- 5/3/17- LM-P, LAD MID 99% STENOSIS , LCX OSTIAL 30% STENOSIS, RCA MILD LUMINAL IRREGULARITIES, EDP 4 , EF 60%, MODERATE SIZED ANTEROLATERAL ANEURYSM, - PCI OF THE MID LAD DONE        5.  S/P angioplasty with stent OF THE MID LAD                  Plan:       The  MOST  Current meds and labs reviewed    Continue the current treatment and with constant vigilance to changes months    Anahi Sargent MD, [facility] [place]

## 2022-12-07 ENCOUNTER — HOSPITAL ENCOUNTER (EMERGENCY)
Age: 82
Discharge: HOME | End: 2022-12-07
Attending: EMERGENCY MEDICINE
Payer: MEDICARE

## 2022-12-07 ENCOUNTER — TELEPHONE (OUTPATIENT)
Dept: CARDIOLOGY CLINIC | Age: 82
End: 2022-12-07

## 2022-12-07 VITALS
HEIGHT: 60 IN | BODY MASS INDEX: 31.02 KG/M2 | RESPIRATION RATE: 10 BRPM | OXYGEN SATURATION: 100 % | HEART RATE: 54 BPM | WEIGHT: 158 LBS | TEMPERATURE: 97.7 F | DIASTOLIC BLOOD PRESSURE: 81 MMHG | SYSTOLIC BLOOD PRESSURE: 137 MMHG

## 2022-12-07 DIAGNOSIS — R00.1 BRADYCARDIA: Primary | ICD-10-CM

## 2022-12-07 LAB
ANION GAP SERPL CALCULATED.3IONS-SCNC: 11 MEQ/L (ref 8–16)
APTT: 32.5 SECONDS (ref 22–38)
BACTERIA: ABNORMAL /HPF
BASOPHILS # BLD: 0.8 %
BASOPHILS ABSOLUTE: 0.1 THOU/MM3 (ref 0–0.1)
BILIRUBIN URINE: NEGATIVE
BLOOD, URINE: ABNORMAL
BUN BLDV-MCNC: 10 MG/DL (ref 7–22)
CALCIUM SERPL-MCNC: 10.2 MG/DL (ref 8.5–10.5)
CASTS 2: ABNORMAL /LPF
CASTS UA: ABNORMAL /LPF
CHARACTER, URINE: CLEAR
CHLORIDE BLD-SCNC: 105 MEQ/L (ref 98–111)
CO2: 25 MEQ/L (ref 23–33)
COLOR: YELLOW
CREAT SERPL-MCNC: 0.4 MG/DL (ref 0.4–1.2)
CRYSTALS, UA: ABNORMAL
EKG ATRIAL RATE: 57 BPM
EKG P AXIS: 45 DEGREES
EKG P-R INTERVAL: 154 MS
EKG Q-T INTERVAL: 392 MS
EKG QRS DURATION: 92 MS
EKG QTC CALCULATION (BAZETT): 381 MS
EKG R AXIS: 21 DEGREES
EKG T AXIS: 45 DEGREES
EKG VENTRICULAR RATE: 57 BPM
EOSINOPHIL # BLD: 2.3 %
EOSINOPHILS ABSOLUTE: 0.2 THOU/MM3 (ref 0–0.4)
EPITHELIAL CELLS, UA: ABNORMAL /HPF
ERYTHROCYTE [DISTWIDTH] IN BLOOD BY AUTOMATED COUNT: 13.6 % (ref 11.5–14.5)
ERYTHROCYTE [DISTWIDTH] IN BLOOD BY AUTOMATED COUNT: 43.9 FL (ref 35–45)
GFR SERPL CREATININE-BSD FRML MDRD: > 60 ML/MIN/1.73M2
GLUCOSE BLD-MCNC: 100 MG/DL (ref 70–108)
GLUCOSE URINE: NEGATIVE MG/DL
HCT VFR BLD CALC: 41.1 % (ref 37–47)
HEMOGLOBIN: 13.6 GM/DL (ref 12–16)
IMMATURE GRANS (ABS): 0.03 THOU/MM3 (ref 0–0.07)
IMMATURE GRANULOCYTES: 0.4 %
INFLUENZA A: NOT DETECTED
INFLUENZA B: NOT DETECTED
KETONES, URINE: NEGATIVE
LEUKOCYTE ESTERASE, URINE: ABNORMAL
LYMPHOCYTES # BLD: 16.4 %
LYMPHOCYTES ABSOLUTE: 1.3 THOU/MM3 (ref 1–4.8)
MCH RBC QN AUTO: 29.2 PG (ref 26–33)
MCHC RBC AUTO-ENTMCNC: 33.1 GM/DL (ref 32.2–35.5)
MCV RBC AUTO: 88.2 FL (ref 81–99)
MISCELLANEOUS 2: ABNORMAL
MONOCYTES # BLD: 9.3 %
MONOCYTES ABSOLUTE: 0.7 THOU/MM3 (ref 0.4–1.3)
NITRITE, URINE: NEGATIVE
NUCLEATED RED BLOOD CELLS: 0 /100 WBC
OSMOLALITY CALCULATION: 280.4 MOSMOL/KG (ref 275–300)
PH UA: 7.5 (ref 5–9)
PLATELET # BLD: 269 THOU/MM3 (ref 130–400)
PMV BLD AUTO: 9.3 FL (ref 9.4–12.4)
POTASSIUM SERPL-SCNC: 4.7 MEQ/L (ref 3.5–5.2)
PROTEIN UA: NEGATIVE
RBC # BLD: 4.66 MILL/MM3 (ref 4.2–5.4)
RBC URINE: ABNORMAL /HPF
RENAL EPITHELIAL, UA: ABNORMAL
SARS-COV-2 RNA, RT PCR: NOT DETECTED
SEG NEUTROPHILS: 70.8 %
SEGMENTED NEUTROPHILS ABSOLUTE COUNT: 5.5 THOU/MM3 (ref 1.8–7.7)
SODIUM BLD-SCNC: 141 MEQ/L (ref 135–145)
SPECIFIC GRAVITY, URINE: 1.01 (ref 1–1.03)
TROPONIN T: < 0.01 NG/ML
UROBILINOGEN, URINE: 0.2 EU/DL (ref 0–1)
WBC # BLD: 7.8 THOU/MM3 (ref 4.8–10.8)
WBC UA: ABNORMAL /HPF
YEAST: ABNORMAL

## 2022-12-07 PROCEDURE — 85025 COMPLETE CBC W/AUTO DIFF WBC: CPT

## 2022-12-07 PROCEDURE — 96361 HYDRATE IV INFUSION ADD-ON: CPT

## 2022-12-07 PROCEDURE — 85730 THROMBOPLASTIN TIME PARTIAL: CPT

## 2022-12-07 PROCEDURE — 99284 EMERGENCY DEPT VISIT MOD MDM: CPT

## 2022-12-07 PROCEDURE — 81001 URINALYSIS AUTO W/SCOPE: CPT

## 2022-12-07 PROCEDURE — 93010 ELECTROCARDIOGRAM REPORT: CPT | Performed by: INTERNAL MEDICINE

## 2022-12-07 PROCEDURE — 6370000000 HC RX 637 (ALT 250 FOR IP): Performed by: EMERGENCY MEDICINE

## 2022-12-07 PROCEDURE — 84484 ASSAY OF TROPONIN QUANT: CPT

## 2022-12-07 PROCEDURE — 87636 SARSCOV2 & INF A&B AMP PRB: CPT

## 2022-12-07 PROCEDURE — 80048 BASIC METABOLIC PNL TOTAL CA: CPT

## 2022-12-07 PROCEDURE — 96360 HYDRATION IV INFUSION INIT: CPT

## 2022-12-07 PROCEDURE — 36415 COLL VENOUS BLD VENIPUNCTURE: CPT

## 2022-12-07 PROCEDURE — 93005 ELECTROCARDIOGRAM TRACING: CPT | Performed by: EMERGENCY MEDICINE

## 2022-12-07 PROCEDURE — 2580000003 HC RX 258: Performed by: EMERGENCY MEDICINE

## 2022-12-07 RX ORDER — 0.9 % SODIUM CHLORIDE 0.9 %
1000 INTRAVENOUS SOLUTION INTRAVENOUS ONCE
Status: COMPLETED | OUTPATIENT
Start: 2022-12-07 | End: 2022-12-07

## 2022-12-07 RX ORDER — MECLIZINE HCL 25MG 25 MG/1
25 TABLET, CHEWABLE ORAL ONCE
Status: COMPLETED | OUTPATIENT
Start: 2022-12-07 | End: 2022-12-07

## 2022-12-07 RX ADMIN — SODIUM CHLORIDE 1000 ML: 9 INJECTION, SOLUTION INTRAVENOUS at 10:38

## 2022-12-07 RX ADMIN — MECLIZINE HYDROCHLORIDE 25 MG: 25 TABLET, CHEWABLE ORAL at 11:53

## 2022-12-07 ASSESSMENT — PAIN - FUNCTIONAL ASSESSMENT: PAIN_FUNCTIONAL_ASSESSMENT: NONE - DENIES PAIN

## 2022-12-07 NOTE — TELEPHONE ENCOUNTER
Patient went to ED today for dizziness and they told her that her HR was too low (54) and to not take her metoprolol tonight and to only take 1 each day until she hears from Dr. Liza Vieira. Patient is on metoprolol 25 mg BID.

## 2022-12-07 NOTE — ED NOTES
Pt was getting out of her vehicle yesterday afternoon around 1400 when she noticed herself feeling dizzy. States the dizziness has been intermittent. She also experienced some burning in her chest which she thought was heart burn around that time believing it was the salad she had for lunch that made her feel this way. Pt was seen by pcp who suggested she come to ER to be checked out. Pt states she does remember having some dizziness in the past and she was told her potassium was low causing the dizziness. EKG completed upon arrival. Pt able to ambulate to bed and change herself into a gown without difficulty.      Sheldon Olvera, JAYME  12/07/22 1000

## 2022-12-08 NOTE — ED PROVIDER NOTES
[facility] [address] [address] EMERGENCY DEPT      CHIEF COMPLAINT       Chief Complaint   Patient presents with    Dizziness       Nurses Notes reviewed and I agree except as noted in the HPI. HISTORY OF PRESENT ILLNESS    Claudell Palau is a 80 y.o. female who presents with complaint of occasionally feeling lightheaded, states that sometimes whenever she changes positions from sitting to standing, she feels lightheaded. Otherwise no LOC, chest pain, SOB. Onset: ACUTE  Duration: DAYS  Timing: INTERMITTENT  Location of Pain: NO PAIN  Intesity/severity: MILD  Modifying Factors: CHANGING POSITIONS  Relieved by;  Previous Episodes; Tx Before arrival: NONE  REVIEW OF SYSTEMS      Review of Systems   Constitutional: Negative for fever, chills, diaphoresis and fatigue. HENT: Negative for congestion, drooling, facial swelling and sore throat. Eyes: Negative for photophobia, pain and discharge. Respiratory: Negative for cough, shortness of breath, wheezing and stridor. Cardiovascular: Negative for chest pain, palpitations and leg swelling. Gastrointestinal: Negative for abdominal pain, blood in stool and abdominal distention. Genitourinary: Negative for dysuria, urgency, hematuria and difficulty urinating. Musculoskeletal: Negative for gait problem, neck pain and neck stiffness. Skin; No rash, No itching  Neurological: Negative for seizures, weakness and numbness. Psychiatric/Behavioral: Negative for hallucinations, confusion and agitation. PAST MEDICAL HISTORY    has a past medical history of Coronary artery disease involving native coronary artery of native heart without angina pectoris, Gall stones, GERD (gastroesophageal reflux disease), Hyperlipidemia, Hypertension, and Osteoporosis. SURGICAL HISTORY      has a past surgical history that includes Tonsillectomy (1958); Cholecystectomy (1990); Eye surgery (2010);  Knee arthroscopy (2010, 2011); other surgical history (5/2012); and knee surgery (Left, 2015). CURRENT MEDICATIONS       Discharge Medication List as of 2022 12:54 PM        CONTINUE these medications which have NOT CHANGED    Details   atorvastatin (LIPITOR) 20 MG tablet Take 1 tablet by mouth daily, Disp-90 tablet, R-3Normal      dilTIAZem (CARTIA XT) 120 MG extended release capsule Take 1 capsule by mouth once daily, Disp-90 capsule, R-3Normal      lisinopril (PRINIVIL;ZESTRIL) 10 MG tablet Take 1 tablet by mouth once daily, Disp-90 tablet, R-3Normal      ticagrelor (BRILINTA) 60 MG TABS tablet Take 1 tablet by mouth 2 times daily, Disp-180 tablet, R-3Normal      metoprolol tartrate (LOPRESSOR) 25 MG tablet Take 1 tablet by mouth twice daily, Disp-180 tablet, R-3Normal      nitroGLYCERIN (NITROSTAT) 0.4 MG SL tablet up to max of 3 total doses. If no relief after 3rd dose, call 911., Disp-25 tablet, R-3Normal      aspirin 81 MG tablet Take 81 mg by mouth daily Historical Med      Multiple Vitamins-Minerals (MULTIVITAMIN PO) Take 1 tablet by mouth daily. Calcium Carbonate-Vitamin D (CALCIUM 500 + D PO) Take 1 tablet by mouth daily. oxybutynin (DITROPAN) 5 MG tablet Take 5 mg by mouth as needed. ALLERGIES     is allergic to codeine. FAMILY HISTORY     She indicated that her mother is . She indicated that her father is . She indicated that her sister is alive. She indicated that her brother is alive. family history includes Arthritis in her father and mother; High Blood Pressure in her father; High Cholesterol in her father; Mental Illness in her father and mother; Stroke in her father. SOCIAL HISTORY      reports that she has never smoked. She has never used smokeless tobacco. She reports that she does not drink alcohol and does not use drugs. PHYSICAL EXAM     INITIAL VITALS:  height is 5' (1.524 m) and weight is 158 lb (71.7 kg). Her temperature is 97.7 °F (36.5 °C). Her blood pressure is 137/81 and her pulse is 54.  Her respiration is 10 and oxygen saturation is 100%. Physical Exam   Constitutional:  well-developed and well-nourished. HENT: Head: Normocephalic, atraumatic, Bilateral external ears normal, Oropharynx mosit, No oral exudates, Nose normal.   Eyes: PERRL, EOMI, Conjunctiva normal, No discharge. No scleral icterus  Neck: Normal range of motion, No tenderness, Supple  Cardiovascular: Normal rate, regular rhythm, S1 normal and S2 normal.  Exam reveals no gallop. Pulmonary/Chest: Effort normal and breath sounds normal. No accessory muscle usage or stridor. No respiratory distress. no wheezes. has no rales. exhibits no tenderness. Abdominal: Soft. Bowel sounds are normal.  exhibits no distension. There is no tenderness. There is no rebound and no guarding. Extremities: No edema, no tenderness, no cyanosis, no clubbing. Musculoskeletal: Good range of motion in major joints is observed. No major deformities noted. Neurological: Alert and oriented ×3, normal motor function, normal sensory function, no focal deficits. GCS 15  Skin: Skin is warm, dry and intact. No rash noted. No erythema. Psychiatric: Affect normal, judgment normal, mood normal.  DIFFERENTIAL DIAGNOSIS:       DIAGNOSTIC RESULTS     EKG: All EKG's are interpreted by the Emergency Department Physician who either signs or Co-signs this chart in the absence of a cardiologist.      RADIOLOGY: non-plain film images(s) such as CT, Ultrasound and MRI are read by the radiologist.  Plain radiographic images are visualized and preliminarily interpreted by the emergency physician unless otherwise stated below.    LABS:   Labs Reviewed   CBC WITH AUTO DIFFERENTIAL - Abnormal; Notable for the following components:       Result Value    MPV 9.3 (*)     All other components within normal limits   URINE WITH REFLEXED MICRO - Abnormal; Notable for the following components:    Blood, Urine TRACE (*)     Leukocyte Esterase, Urine TRACE (*)     All other components within normal limits   COVID-19 & INFLUENZA COMBO   BASIC METABOLIC PANEL   TROPONIN   APTT   GLOMERULAR FILTRATION RATE, ESTIMATED   ANION GAP   OSMOLALITY       EMERGENCY DEPARTMENT COURSE:   Vitals:    Vitals:    12/07/22 0948 12/07/22 0953 12/07/22 1038 12/07/22 1153   BP:  (!) 164/67  137/81   Pulse: 55  54 54   Resp: 18  13 10   Temp: 97.7 °F (36.5 °C)      SpO2: 98%  99% 100%   Weight: 158 lb (71.7 kg)      Height: 5' (1.524 m)        Pt has mild bradycardia most of the time, but she drops down to high 80's occasionally. She was asymptomatic during her stay in the ED. She is on metoprolol BID,advised to reduce dosing to daily. I sent a note her cardiologist to check on her and make recommendation for outpatient follow-up. CRITICAL CARE:       CONSULTS:  None    PROCEDURES:  None    FINAL IMPRESSION      1.  Bradycardia          DISPOSITION/PLAN   Decision To Discharge    PATIENT REFERRED TO:  Carson De León MD  [facility], [address]  [phone]    Call today  reduced metoprolol by half      DISCHARGE MEDICATIONS:  Discharge Medication List as of 12/7/2022 12:54 PM          (Please note that portions of this note were completed with a voice recognition program.  Efforts were made to edit the dictations but occasionally words are mis-transcribed.)    Hayley Guzman, [address] [place] [address], DO  12/08/22 1317

## 2022-12-08 NOTE — TELEPHONE ENCOUNTER
Okay to take lower dose of lopressor 12.5 po bid instead of 25 bid  However, I do not think the HR of 54 cause dizziness

## 2023-01-23 ENCOUNTER — HOSPITAL ENCOUNTER (OUTPATIENT)
Age: 83
Discharge: HOME | End: 2023-01-23
Payer: MEDICARE

## 2023-01-23 ENCOUNTER — OFFICE VISIT (OUTPATIENT)
Dept: INTERNAL MEDICINE CLINIC | Age: 83
End: 2023-01-23

## 2023-01-23 VITALS
SYSTOLIC BLOOD PRESSURE: 122 MMHG | TEMPERATURE: 97 F | RESPIRATION RATE: 18 BRPM | OXYGEN SATURATION: 94 % | DIASTOLIC BLOOD PRESSURE: 62 MMHG | HEART RATE: 88 BPM | BODY MASS INDEX: 31.44 KG/M2 | WEIGHT: 161 LBS

## 2023-01-23 DIAGNOSIS — Z78.0 POSTMENOPAUSAL: ICD-10-CM

## 2023-01-23 DIAGNOSIS — M81.0 AGE-RELATED OSTEOPOROSIS WITHOUT CURRENT PATHOLOGICAL FRACTURE: ICD-10-CM

## 2023-01-23 DIAGNOSIS — E04.2 MULTINODULAR GOITER: Primary | ICD-10-CM

## 2023-01-23 LAB — 25(OH)D3 SERPL-MCNC: 44 NG/ML (ref 30–100)

## 2023-01-23 PROCEDURE — 82306 VITAMIN D 25 HYDROXY: CPT

## 2023-01-23 PROCEDURE — 36415 COLL VENOUS BLD VENIPUNCTURE: CPT

## 2023-01-23 SDOH — ECONOMIC STABILITY: FOOD INSECURITY: WITHIN THE PAST 12 MONTHS, YOU WORRIED THAT YOUR FOOD WOULD RUN OUT BEFORE YOU GOT MONEY TO BUY MORE.: NEVER TRUE

## 2023-01-23 SDOH — ECONOMIC STABILITY: FOOD INSECURITY: WITHIN THE PAST 12 MONTHS, THE FOOD YOU BOUGHT JUST DIDN'T LAST AND YOU DIDN'T HAVE MONEY TO GET MORE.: NEVER TRUE

## 2023-01-23 ASSESSMENT — PATIENT HEALTH QUESTIONNAIRE - PHQ9
1. LITTLE INTEREST OR PLEASURE IN DOING THINGS: 0
SUM OF ALL RESPONSES TO PHQ QUESTIONS 1-9: 0
SUM OF ALL RESPONSES TO PHQ9 QUESTIONS 1 & 2: 0
2. FEELING DOWN, DEPRESSED OR HOPELESS: 0
SUM OF ALL RESPONSES TO PHQ QUESTIONS 1-9: 0

## 2023-01-23 ASSESSMENT — SOCIAL DETERMINANTS OF HEALTH (SDOH): HOW HARD IS IT FOR YOU TO PAY FOR THE VERY BASICS LIKE FOOD, HOUSING, MEDICAL CARE, AND HEATING?: NOT HARD AT ALL

## 2023-01-23 NOTE — PROGRESS NOTES
1940    Chief Complaint   Patient presents with    Other     Mulitnodular goiter       Pt is a 80 y.o. female who presents for an initial visit. She is a previous patient of Dr. Linda Padilla. She also sees Dr. Catarino Guzman for management of her CAD, HLD, and HTN and sees EDER Yeager for her primary care. She comes for management of her multinodular goiter and osteoporosis for which she has also been seen by Dr. Zeina Mann in the past. She states that she feels well since decreasing her metoprolol in December after a single episode of dizziness for which she was seen in the ED. Notes compliance with her medications and denies any concerns at this time. Denies headache, vision changes, diaphoresis, weight changes, difficulty swallowing, GERD symptoms, chest pain/pressure, palpitations, abdominal pain, changes in bowels/bladder, skin changes, or new edema.       Past Medical History:   Diagnosis Date    Coronary artery disease involving native coronary artery of native heart without angina pectoris 5/5/2017    Gall stones     gall bladder removed in the 1970s    GERD (gastroesophageal reflux disease)     Hyperlipidemia     Hypertension     Osteoporosis        Past Surgical History:   Procedure Laterality Date    CHOLECYSTECTOMY  1990    EYE SURGERY  2010    KNEE ARTHROSCOPY  2010, 2011    KNEE SURGERY Left April 2015    OTHER SURGICAL HISTORY  5/2012    Patient had skin removed from eye lid    TONSILLECTOMY  1958       Current Outpatient Medications   Medication Sig Dispense Refill    metoprolol tartrate (LOPRESSOR) 25 MG tablet Take 12.5 mg by mouth 2 times daily      atorvastatin (LIPITOR) 20 MG tablet Take 1 tablet by mouth daily 90 tablet 3    dilTIAZem (CARTIA XT) 120 MG extended release capsule Take 1 capsule by mouth once daily 90 capsule 3    lisinopril (PRINIVIL;ZESTRIL) 10 MG tablet Take 1 tablet by mouth once daily 90 tablet 3    ticagrelor (BRILINTA) 60 MG TABS tablet Take 1 tablet by mouth 2 times daily 180 tablet 3    nitroGLYCERIN (NITROSTAT) 0.4 MG SL tablet up to max of 3 total doses. If no relief after 3rd dose, call 911. 25 tablet 3    aspirin 81 MG tablet Take 81 mg by mouth daily       Multiple Vitamins-Minerals (MULTIVITAMIN PO) Take 1 tablet by mouth daily. Calcium Carbonate-Vitamin D (CALCIUM 500 + D PO) Take 1 tablet by mouth daily. oxybutynin (DITROPAN) 5 MG tablet Take 5 mg by mouth as needed. No current facility-administered medications for this visit. Allergies   Allergen Reactions    Codeine Anxiety       Social History     Socioeconomic History    Marital status:      Spouse name: Not on file    Number of children: Not on file    Years of education: Not on file    Highest education level: Not on file   Occupational History    Not on file   Tobacco Use    Smoking status: Never    Smokeless tobacco: Never   Vaping Use    Vaping Use: Never used   Substance and Sexual Activity    Alcohol use: No     Alcohol/week: 0.0 standard drinks     Comment: 2 drinks per year     Drug use: No    Sexual activity: Never   Other Topics Concern    Not on file   Social History Narrative    Not on file     Social Determinants of Health     Financial Resource Strain: Low Risk     Difficulty of Paying Living Expenses: Not hard at all   Food Insecurity: No Food Insecurity    Worried About Running Out of Food in the Last Year: Never true    Ran Out of Food in the Last Year: Never true   Transportation Needs: Not on file   Physical Activity: Not on file   Stress: Not on file   Social Connections: Not on file   Intimate Partner Violence: Not on file   Housing Stability: Not on file       Family History   Problem Relation Age of Onset    Arthritis Mother     Mental Illness Mother     Arthritis Father     High Blood Pressure Father     High Cholesterol Father     Stroke Father     Mental Illness Father      ROS:  Negative except for what is noted above in HPI.       Blood pressure 122/62, pulse 88, temperature 97 °F (36.1 °C), temperature source Temporal, resp. rate 18, weight 161 lb (73 kg), SpO2 94 %, not currently breastfeeding. Physical Exam    General: well-appearing, elderly,  female, alert, pleasant, cooperative  Eyes:  Nonicteric, no conjunctivitis, EOMs intact. HENT: Normocephalic, atraumatic. Nares normal. Oral mucosa moist.  Hearing mildly impaired. Neck: Supple, with full range of motion. Respiratory:  Normal effort. Clear to auscultation, without rales or wheezes or rhonchi. Cardiovascular: RRR, good S1/S2, rubs, gallops, or murmurs. No lower extremity pitting edema. Abdomen: Soft, non-tender, non-distended. Bowel sounds present. Musculoskeletal: No joint swelling or tenderness. No point tenderness over spinous processes. Normal tone. No abnormal movements. Skin: Warm and dry. No rashes or lesions. Neurologic:  No focal sensory/motor deficits in the upper or lower extremities. Psychiatric: Alert and oriented, normal insight and thought content. Capillary Refill: Brisk,< 3 seconds. Peripheral Pulses: +1 palpable, equal bilaterally. Diagnostic data:   I have reviewed recent diagnostic testing including labs with patient today. Assessment and Plan:     ASSESSMENT PLAN   1. Multinodular goiter. Previously followed with Dr. Una Britton and Dr. Zina Casillas. Most recent [address] showed stable multinodular goiter unchanged from 2018 and stable left cervical lymph node with benign morphology. 2 year follow up was recommended. Most recent TSH [id number] within reference range at 0.6. TSH With Reflex Ft4 January 2024. Return to clinic in 1 year for surveillance. Plan for repeat US Thyroid in April 2024.      2. Age-related osteoporosis without current pathological fracture. Hx of osteoporosis, previously on Fosamax, discontinued per patient preference d/t concerns of side effects. Patient is open to treating osteoporosis should DEXA show worsening disease status.  Vitamin D 25 Hydroxy level ordered      3. Postmenopausal  DEXA BONE DENSITY AXIAL SKELETON  Return to clinic in 3 months for follow up on DEXA results              Staffed with: Dr. Isela Awan. . [facility]  [address]  Dept: [phone]  Dept Fax: [phone] : [phone]

## 2023-01-23 NOTE — PATIENT INSTRUCTIONS
Continue to see your primary care provider as scheduled. Return to clinic in 3 months to review results of your DEXA scan and in 1 year for surveillance of your multinodular goiter.

## 2023-03-14 ENCOUNTER — HOSPITAL ENCOUNTER (OUTPATIENT)
Dept: WOMENS IMAGING | Age: 83
Discharge: HOME | End: 2023-03-14
Payer: MEDICARE

## 2023-03-14 DIAGNOSIS — Z78.0 POSTMENOPAUSAL: ICD-10-CM

## 2023-03-14 PROCEDURE — 77080 DXA BONE DENSITY AXIAL: CPT

## 2023-03-22 ENCOUNTER — TELEPHONE (OUTPATIENT)
Dept: INTERNAL MEDICINE CLINIC | Age: 83
End: 2023-03-22

## 2023-03-22 DIAGNOSIS — E04.2 MULTINODULAR GOITER: Primary | ICD-10-CM

## 2023-03-22 NOTE — TELEPHONE ENCOUNTER
----- Message from Moses Viveros MD sent at 3/19/2023 10:40 PM EDT -----  Let her know that DEXA and vitamin D are normal.  I don't see any TSH within last one year - please order. Move next appt to 5/2024, order thyroid u/s prior to visit.
Detail Level: Detailed
Left message to call the office .
Patient informed of results . Thyroid US and TSH ordered . Appointment moved to 5/22/23 . Lab order mailed to patient .
Detail Level: Zone

## 2023-04-18 ENCOUNTER — HOSPITAL ENCOUNTER (OUTPATIENT)
Age: 83
Discharge: HOME | End: 2023-04-18
Payer: MEDICARE

## 2023-04-18 DIAGNOSIS — E04.2 MULTINODULAR GOITER: ICD-10-CM

## 2023-04-18 LAB — TSH SERPL DL<=0.005 MIU/L-ACNC: 0.8 UIU/ML (ref 0.4–4.2)

## 2023-04-18 PROCEDURE — 84443 ASSAY THYROID STIM HORMONE: CPT

## 2023-04-18 PROCEDURE — 36415 COLL VENOUS BLD VENIPUNCTURE: CPT

## 2023-05-15 ENCOUNTER — OFFICE VISIT (OUTPATIENT)
Dept: CARDIOLOGY CLINIC | Age: 83
End: 2023-05-15
Payer: MEDICARE

## 2023-05-15 ENCOUNTER — HOSPITAL ENCOUNTER (OUTPATIENT)
Dept: ULTRASOUND IMAGING | Age: 83
Discharge: HOME | End: 2023-05-15
Payer: MEDICARE

## 2023-05-15 VITALS
HEIGHT: 60 IN | WEIGHT: 157.8 LBS | DIASTOLIC BLOOD PRESSURE: 65 MMHG | BODY MASS INDEX: 30.98 KG/M2 | SYSTOLIC BLOOD PRESSURE: 143 MMHG | HEART RATE: 66 BPM

## 2023-05-15 DIAGNOSIS — I25.10 CORONARY ARTERY DISEASE INVOLVING NATIVE CORONARY ARTERY OF NATIVE HEART WITHOUT ANGINA PECTORIS: Primary | ICD-10-CM

## 2023-05-15 DIAGNOSIS — E04.2 MULTINODULAR GOITER: ICD-10-CM

## 2023-05-15 DIAGNOSIS — I10 PRIMARY HYPERTENSION: ICD-10-CM

## 2023-05-15 DIAGNOSIS — R00.1 SINUS BRADYCARDIA: ICD-10-CM

## 2023-05-15 DIAGNOSIS — R06.02 SOB (SHORTNESS OF BREATH) ON EXERTION: ICD-10-CM

## 2023-05-15 DIAGNOSIS — E78.00 PURE HYPERCHOLESTEROLEMIA: ICD-10-CM

## 2023-05-15 DIAGNOSIS — Z98.890 S/P CARDIAC CATH: ICD-10-CM

## 2023-05-15 DIAGNOSIS — Z95.820 S/P ANGIOPLASTY WITH STENT: ICD-10-CM

## 2023-05-15 PROCEDURE — 1036F TOBACCO NON-USER: CPT | Performed by: INTERNAL MEDICINE

## 2023-05-15 PROCEDURE — 3078F DIAST BP <80 MM HG: CPT | Performed by: INTERNAL MEDICINE

## 2023-05-15 PROCEDURE — G8399 PT W/DXA RESULTS DOCUMENT: HCPCS | Performed by: INTERNAL MEDICINE

## 2023-05-15 PROCEDURE — 3077F SYST BP >= 140 MM HG: CPT | Performed by: INTERNAL MEDICINE

## 2023-05-15 PROCEDURE — G8427 DOCREV CUR MEDS BY ELIG CLIN: HCPCS | Performed by: INTERNAL MEDICINE

## 2023-05-15 PROCEDURE — 1090F PRES/ABSN URINE INCON ASSESS: CPT | Performed by: INTERNAL MEDICINE

## 2023-05-15 PROCEDURE — 1123F ACP DISCUSS/DSCN MKR DOCD: CPT | Performed by: INTERNAL MEDICINE

## 2023-05-15 PROCEDURE — G8417 CALC BMI ABV UP PARAM F/U: HCPCS | Performed by: INTERNAL MEDICINE

## 2023-05-15 PROCEDURE — 99214 OFFICE O/P EST MOD 30 MIN: CPT | Performed by: INTERNAL MEDICINE

## 2023-05-15 PROCEDURE — 76536 US EXAM OF HEAD AND NECK: CPT

## 2023-05-15 NOTE — ADDENDUM NOTE
Addended by: Akin Medina on: 5/15/2023 11:52 AM     Modules accepted: Orders Enma Babin  (RN)  2020 05:27:50

## 2023-05-15 NOTE — PROGRESS NOTES
Subjective:      Patient ID: Travis Carson is a 80 y.o. female. HPI  Chief Complaint   Patient presents with    6 Month Follow-Up    Coronary Artery Disease     Originally  Has some \"dull pain in her Rt breast when she touches it\". NONtender and had Mamogram and was okay   The Chest discomfort on the RT siae last 2 sec once in a while   The chest discomfort is not related to exercise  Awaiting for Barium eval  Has dysphagia at times  Has gotten better in last couple months. She is not sure if it is heartburn. Patient here for a 6 month follow up. Pt wants to talk about her nose bleeds     Patient states nothing in her med list has changed     EKG done 12/7/2022    Hx of orthostatic dizziness one time dec 2022 and seen ER and  sent home    No more dizziness    Occasional epistaxis    Patient denied cp,  Palpitation, dizziness and swelling     Palpitation  resolved after metoprolol    Complains of bruises after bump    NO sob    Feel good    Started on lopressor for HTN and palpit and could not tolerate - hence stop it. The pyrosis got better after nexium 40 mg po qd    Patient Seen, Chart, Consults notes, Labs, Radiology studies reviewed.     [address] [facility],[address]  Father had stent at age -63's    Patient Active Problem List   Diagnosis    Multinodular goiter    Hyperlipidemia    Hypertension    Abnormal LFTs- may 2014- resolved    GERD (gastroesophageal reflux disease)    Hepatic cyst-under F/u with GI    Heart palpitations in the ears only on waking up in the AM    Sinus tachycardia    S/P cardiac cath- 5/3/17- LM-P, LAD MID 99% STENOSIS , LCX OSTIAL 30% STENOSIS, RCA MILD LUMINAL IRREGULARITIES, EDP 4 , EF 60%, MODERATE SIZED ANTEROLATERAL ANEURYSM, - PCI OF THE MID LAD DONE    S/P angioplasty with stent OF THE MID LAD    Coronary artery disease involving native coronary artery of native heart without angina pectoris    Sinus bradycardia    SOB (shortness of breath) on exertion       Current Outpatient

## 2023-05-22 ENCOUNTER — OFFICE VISIT (OUTPATIENT)
Dept: INTERNAL MEDICINE CLINIC | Age: 83
End: 2023-05-22
Payer: MEDICARE

## 2023-05-22 VITALS
HEART RATE: 56 BPM | HEIGHT: 60 IN | SYSTOLIC BLOOD PRESSURE: 132 MMHG | WEIGHT: 156 LBS | BODY MASS INDEX: 30.63 KG/M2 | DIASTOLIC BLOOD PRESSURE: 72 MMHG

## 2023-05-22 DIAGNOSIS — E04.2 MULTINODULAR GOITER: Primary | ICD-10-CM

## 2023-05-22 DIAGNOSIS — M81.0 AGE-RELATED OSTEOPOROSIS WITHOUT CURRENT PATHOLOGICAL FRACTURE: ICD-10-CM

## 2023-05-22 DIAGNOSIS — Z78.0 POSTMENOPAUSAL: ICD-10-CM

## 2023-05-22 PROCEDURE — 1036F TOBACCO NON-USER: CPT | Performed by: PSYCHIATRY & NEUROLOGY

## 2023-05-22 PROCEDURE — 1123F ACP DISCUSS/DSCN MKR DOCD: CPT | Performed by: PSYCHIATRY & NEUROLOGY

## 2023-05-22 PROCEDURE — G8427 DOCREV CUR MEDS BY ELIG CLIN: HCPCS | Performed by: PSYCHIATRY & NEUROLOGY

## 2023-05-22 PROCEDURE — 3075F SYST BP GE 130 - 139MM HG: CPT | Performed by: PSYCHIATRY & NEUROLOGY

## 2023-05-22 PROCEDURE — G8399 PT W/DXA RESULTS DOCUMENT: HCPCS | Performed by: PSYCHIATRY & NEUROLOGY

## 2023-05-22 PROCEDURE — 3078F DIAST BP <80 MM HG: CPT | Performed by: PSYCHIATRY & NEUROLOGY

## 2023-05-22 PROCEDURE — 99213 OFFICE O/P EST LOW 20 MIN: CPT | Performed by: PSYCHIATRY & NEUROLOGY

## 2023-05-22 PROCEDURE — G8417 CALC BMI ABV UP PARAM F/U: HCPCS | Performed by: PSYCHIATRY & NEUROLOGY

## 2023-05-22 PROCEDURE — 1090F PRES/ABSN URINE INCON ASSESS: CPT | Performed by: PSYCHIATRY & NEUROLOGY

## 2023-05-22 SDOH — ECONOMIC STABILITY: FOOD INSECURITY: WITHIN THE PAST 12 MONTHS, YOU WORRIED THAT YOUR FOOD WOULD RUN OUT BEFORE YOU GOT MONEY TO BUY MORE.: NEVER TRUE

## 2023-05-22 SDOH — ECONOMIC STABILITY: INCOME INSECURITY: HOW HARD IS IT FOR YOU TO PAY FOR THE VERY BASICS LIKE FOOD, HOUSING, MEDICAL CARE, AND HEATING?: NOT HARD AT ALL

## 2023-05-22 SDOH — ECONOMIC STABILITY: HOUSING INSECURITY
IN THE LAST 12 MONTHS, WAS THERE A TIME WHEN YOU DID NOT HAVE A STEADY PLACE TO SLEEP OR SLEPT IN A SHELTER (INCLUDING NOW)?: NO

## 2023-05-22 SDOH — ECONOMIC STABILITY: FOOD INSECURITY: WITHIN THE PAST 12 MONTHS, THE FOOD YOU BOUGHT JUST DIDN'T LAST AND YOU DIDN'T HAVE MONEY TO GET MORE.: NEVER TRUE

## 2023-05-22 NOTE — PROGRESS NOTES
MG tablet Take 1 tablet by mouth daily 90 tablet 3    dilTIAZem (CARTIA XT) 120 MG extended release capsule Take 1 capsule by mouth once daily 90 capsule 3    lisinopril (PRINIVIL;ZESTRIL) 10 MG tablet Take 1 tablet by mouth once daily 90 tablet 3    ticagrelor (BRILINTA) 60 MG TABS tablet Take 1 tablet by mouth 2 times daily 180 tablet 3    nitroGLYCERIN (NITROSTAT) 0.4 MG SL tablet up to max of 3 total doses. If no relief after 3rd dose, call 911. 25 tablet 3    aspirin 81 MG tablet Take 1 tablet by mouth daily      Multiple Vitamins-Minerals (MULTIVITAMIN PO) Take 1 tablet by mouth daily. Calcium Carbonate-Vitamin D (CALCIUM 500 + D PO) Take 1 tablet by mouth daily. oxybutynin (DITROPAN) 5 MG tablet Take 1 tablet by mouth as needed       No current facility-administered medications for this visit. Allergies   Allergen Reactions    Codeine Anxiety       Review of Systems - General ROS: negative  Psychological ROS: negative  Hematological and Lymphatic ROS: No history of blood clots or bleeding disorder. Respiratory ROS: no cough, shortness of breath, or wheezing  Cardiovascular ROS: no chest pain or dyspnea on exertion  Gastrointestinal ROS: no abdominal pain, change in bowel habits, or black or bloody stools  Genito-Urinary ROS: no dysuria, trouble voiding, or hematuria  Musculoskeletal ROS: negative  Neurological ROS: no TIA or stroke symptoms  Dermatological ROS: negative    Blood pressure 132/72, pulse 56, height 5' (1.524 m), weight 156 lb (70.8 kg), not currently breastfeeding.     Physical Examination: General appearance - alert, well appearing, and in no distress  Mental status - alert, oriented to person, place, and time  Neck - supple, no significant adenopathy, no JVD, or carotid bruits  Chest - clear to auscultation, no wheezes, rales or rhonchi, symmetric air entry  Heart - normal rate, regular rhythm, no murmurs, rubs, clicks or gallops  Abdomen - soft, nontender,

## 2023-08-20 ENCOUNTER — HOSPITAL ENCOUNTER (EMERGENCY)
Age: 83
Discharge: HOME | End: 2023-08-20
Payer: MEDICARE

## 2023-08-20 VITALS
HEART RATE: 65 BPM | RESPIRATION RATE: 18 BRPM | WEIGHT: 155 LBS | OXYGEN SATURATION: 98 % | HEIGHT: 60 IN | TEMPERATURE: 97.9 F | DIASTOLIC BLOOD PRESSURE: 80 MMHG | BODY MASS INDEX: 30.43 KG/M2 | SYSTOLIC BLOOD PRESSURE: 167 MMHG

## 2023-08-20 DIAGNOSIS — H61.21 IMPACTED CERUMEN OF RIGHT EAR: Primary | ICD-10-CM

## 2023-08-20 DIAGNOSIS — E86.0 DEHYDRATION: ICD-10-CM

## 2023-08-20 DIAGNOSIS — Z87.898 HISTORY OF VERTIGO: ICD-10-CM

## 2023-08-20 LAB
BUN BLD-MCNC: 21 MG/DL (ref 8–26)
CHLORIDE BLD-SCNC: 105 MEQ/L (ref 98–109)
CO2 BLD CALC-SCNC: 22 MEQ/L (ref 23–33)
CREAT BLD-MCNC: 0.4 MG/DL (ref 0.5–1.2)
GFR SERPL CREATININE-BSD FRML MDRD: > 60 ML/MIN/1.73M2
GLUCOSE BLD-MCNC: 113 MG/DL (ref 70–108)
POTASSIUM BLD-SCNC: 4.1 MEQ/L (ref 3.5–4.9)
SODIUM BLD-SCNC: 138 MEQ/L (ref 138–146)

## 2023-08-20 PROCEDURE — 82565 ASSAY OF CREATININE: CPT

## 2023-08-20 PROCEDURE — 93005 ELECTROCARDIOGRAM TRACING: CPT | Performed by: NURSE PRACTITIONER

## 2023-08-20 PROCEDURE — 80051 ELECTROLYTE PANEL: CPT

## 2023-08-20 PROCEDURE — 84520 ASSAY OF UREA NITROGEN: CPT

## 2023-08-20 PROCEDURE — 82947 ASSAY GLUCOSE BLOOD QUANT: CPT

## 2023-08-20 PROCEDURE — 99213 OFFICE O/P EST LOW 20 MIN: CPT | Performed by: NURSE PRACTITIONER

## 2023-08-20 PROCEDURE — 99213 OFFICE O/P EST LOW 20 MIN: CPT

## 2023-08-20 PROCEDURE — 6370000000 HC RX 637 (ALT 250 FOR IP): Performed by: NURSE PRACTITIONER

## 2023-08-20 RX ORDER — ONDANSETRON 4 MG/1
4 TABLET, ORALLY DISINTEGRATING ORAL ONCE
Status: COMPLETED | OUTPATIENT
Start: 2023-08-20 | End: 2023-08-20

## 2023-08-20 RX ORDER — MECLIZINE HYDROCHLORIDE 25 MG/1
25 TABLET ORAL 3 TIMES DAILY PRN
Qty: 30 TABLET | Refills: 0 | Status: SHIPPED | OUTPATIENT
Start: 2023-08-20 | End: 2023-08-30

## 2023-08-20 RX ORDER — MECLIZINE HCL 25MG 25 MG/1
25 TABLET, CHEWABLE ORAL ONCE
Status: COMPLETED | OUTPATIENT
Start: 2023-08-20 | End: 2023-08-20

## 2023-08-20 RX ADMIN — ONDANSETRON 4 MG: 4 TABLET, ORALLY DISINTEGRATING ORAL at 11:56

## 2023-08-20 RX ADMIN — MECLIZINE HYDROCHLORIDE 25 MG: 25 TABLET, CHEWABLE ORAL at 11:33

## 2023-08-20 ASSESSMENT — ENCOUNTER SYMPTOMS
COUGH: 0
RHINORRHEA: 0
DIARRHEA: 0
CHEST TIGHTNESS: 0
SORE THROAT: 0
VOMITING: 0
STRIDOR: 0
SINUS PRESSURE: 0
NAUSEA: 1
APNEA: 0
BLOOD IN STOOL: 0
WHEEZING: 0
SHORTNESS OF BREATH: 0
VOICE CHANGE: 0
VISUAL CHANGE: 0
TROUBLE SWALLOWING: 0
CHOKING: 0

## 2023-08-20 ASSESSMENT — PAIN - FUNCTIONAL ASSESSMENT: PAIN_FUNCTIONAL_ASSESSMENT: NONE - DENIES PAIN

## 2023-08-20 NOTE — ED NOTES
Pt with complaints of dizziness and nausea that started last night. States dizziness is with positional changes. States she has not been drinking a lot of water. States she has been diagnosed with vertigo in the past. Denies any chest pain or shortness of breath. States it feels as if the room is spinning.      Anya Sommers, ALBA  44/58/02 1257

## 2023-08-20 NOTE — ED PROVIDER NOTES
[address]  Urgent Care Encounter      CHIEF COMPLAINT       Chief Complaint   Patient presents with    Dizziness    Nausea       Nurses Notes reviewed and I agree except as noted in the HPI. HISTORY OFPRESENT ILLNESS   Rob Moreau is a 80 y.o. The history is provided by the patient. No  was used. Dizziness  Quality:  Head spinning  Severity:  Severe  Onset quality:  Sudden  Duration:  16 hours  Timing:  Intermittent  Progression:  Waxing and waning  Chronicity:  Recurrent (reports similar to vertigo 15 years ago)  Context: bending over, head movement, physical activity and standing up    Context: not with bowel movement, not with ear pain, not with eye movement, not with inactivity, not with loss of consciousness, not with medication and not when urinating    Relieved by:  Nothing  Worsened by:  Lying down  Ineffective treatments:  None tried  Associated symptoms: nausea    Associated symptoms: no blood in stool, no chest pain, no diarrhea, no headaches, no hearing loss, no palpitations, no shortness of breath, no syncope, no tinnitus, no vision changes, no vomiting and no weakness    Risk factors: hx of vertigo and multiple medications    Risk factors: no anemia, no heart disease, no hx of stroke, no Meniere's disease and no new medications      REVIEW OF SYSTEMS     Review of Systems   Constitutional:  Negative for activity change, appetite change, chills, diaphoresis, fatigue, fever and unexpected weight change. HENT:  Negative for congestion, hearing loss, postnasal drip, rhinorrhea, sinus pressure, sore throat, tinnitus, trouble swallowing and voice change. Respiratory:  Negative for apnea, cough, choking, chest tightness, shortness of breath, wheezing and stridor. Cardiovascular:  Negative for chest pain, palpitations, leg swelling and syncope. Gastrointestinal:  Positive for nausea. Negative for blood in stool, diarrhea and vomiting.

## 2023-08-21 PROCEDURE — 93010 ELECTROCARDIOGRAM REPORT: CPT | Performed by: INTERNAL MEDICINE

## 2023-08-26 LAB
EKG ATRIAL RATE: 67 BPM
EKG P AXIS: 44 DEGREES
EKG P-R INTERVAL: 158 MS
EKG Q-T INTERVAL: 378 MS
EKG QRS DURATION: 92 MS
EKG QTC CALCULATION (BAZETT): 399 MS
EKG R AXIS: 16 DEGREES
EKG T AXIS: 36 DEGREES
EKG VENTRICULAR RATE: 67 BPM

## 2023-09-29 RX ORDER — DILTIAZEM HYDROCHLORIDE 120 MG/1
CAPSULE, COATED, EXTENDED RELEASE ORAL
Qty: 90 CAPSULE | Refills: 0 | Status: SHIPPED | OUTPATIENT
Start: 2023-09-29

## 2023-10-12 ENCOUNTER — HOSPITAL ENCOUNTER (OUTPATIENT)
Age: 83
Setting detail: OBSERVATION
Discharge: HOME | End: 2023-10-14
Attending: EMERGENCY MEDICINE
Payer: MEDICARE

## 2023-10-12 ENCOUNTER — TELEPHONE (OUTPATIENT)
Dept: CARDIOLOGY CLINIC | Age: 83
End: 2023-10-12

## 2023-10-12 ENCOUNTER — APPOINTMENT (OUTPATIENT)
Dept: GENERAL RADIOLOGY | Age: 83
End: 2023-10-12
Payer: MEDICARE

## 2023-10-12 DIAGNOSIS — K76.89 LIVER CYST: ICD-10-CM

## 2023-10-12 DIAGNOSIS — R07.9 CHEST PAIN, UNSPECIFIED TYPE: Primary | ICD-10-CM

## 2023-10-12 LAB
ANION GAP SERPL CALC-SCNC: 14 MEQ/L (ref 8–16)
BASOPHILS ABSOLUTE: 0.1 THOU/MM3 (ref 0–0.1)
BASOPHILS NFR BLD AUTO: 0.6 %
BUN SERPL-MCNC: 20 MG/DL (ref 7–22)
CALCIUM SERPL-MCNC: 10.8 MG/DL (ref 8.5–10.5)
CHLORIDE SERPL-SCNC: 102 MEQ/L (ref 98–111)
CO2 SERPL-SCNC: 24 MEQ/L (ref 23–33)
CREAT SERPL-MCNC: 0.5 MG/DL (ref 0.4–1.2)
DEPRECATED RDW RBC AUTO: 45.6 FL (ref 35–45)
EKG ATRIAL RATE: 74 BPM
EKG ATRIAL RATE: 86 BPM
EKG P AXIS: 53 DEGREES
EKG P AXIS: 63 DEGREES
EKG P-R INTERVAL: 142 MS
EKG P-R INTERVAL: 148 MS
EKG Q-T INTERVAL: 372 MS
EKG Q-T INTERVAL: 376 MS
EKG QRS DURATION: 92 MS
EKG QRS DURATION: 92 MS
EKG QTC CALCULATION (BAZETT): 417 MS
EKG QTC CALCULATION (BAZETT): 445 MS
EKG R AXIS: 21 DEGREES
EKG R AXIS: 32 DEGREES
EKG T AXIS: 46 DEGREES
EKG T AXIS: 66 DEGREES
EKG VENTRICULAR RATE: 74 BPM
EKG VENTRICULAR RATE: 86 BPM
EOSINOPHIL NFR BLD AUTO: 1 %
EOSINOPHILS ABSOLUTE: 0.1 THOU/MM3 (ref 0–0.4)
ERYTHROCYTE [DISTWIDTH] IN BLOOD BY AUTOMATED COUNT: 13.4 % (ref 11.5–14.5)
GFR SERPL CREATININE-BSD FRML MDRD: > 60 ML/MIN/1.73M2
GLUCOSE SERPL-MCNC: 119 MG/DL (ref 70–108)
HCT VFR BLD AUTO: 43.2 % (ref 37–47)
HGB BLD-MCNC: 13.9 GM/DL (ref 12–16)
IMM GRANULOCYTES # BLD AUTO: 0.04 THOU/MM3 (ref 0–0.07)
IMM GRANULOCYTES NFR BLD AUTO: 0.4 %
LYMPHOCYTES ABSOLUTE: 1.6 THOU/MM3 (ref 1–4.8)
LYMPHOCYTES NFR BLD AUTO: 16.2 %
MCH RBC QN AUTO: 29.7 PG (ref 26–33)
MCHC RBC AUTO-ENTMCNC: 32.2 GM/DL (ref 32.2–35.5)
MCV RBC AUTO: 92.3 FL (ref 81–99)
MONOCYTES ABSOLUTE: 0.7 THOU/MM3 (ref 0.4–1.3)
MONOCYTES NFR BLD AUTO: 6.9 %
NEUTROPHILS NFR BLD AUTO: 74.9 %
NRBC BLD AUTO-RTO: 0 /100 WBC
NT-PROBNP SERPL IA-MCNC: 139.1 PG/ML (ref 0–449)
OSMOLALITY SERPL CALC.SUM OF ELEC: 283.2 MOSMOL/KG (ref 275–300)
PLATELET # BLD AUTO: 292 THOU/MM3 (ref 130–400)
PMV BLD AUTO: 9.3 FL (ref 9.4–12.4)
POTASSIUM SERPL-SCNC: 4.9 MEQ/L (ref 3.5–5.2)
RBC # BLD AUTO: 4.68 MILL/MM3 (ref 4.2–5.4)
SEGMENTED NEUTROPHILS ABSOLUTE COUNT: 7.4 THOU/MM3 (ref 1.8–7.7)
SODIUM SERPL-SCNC: 140 MEQ/L (ref 135–145)
TROPONIN, HIGH SENSITIVITY: 11 NG/L (ref 0–12)
TROPONIN, HIGH SENSITIVITY: 12 NG/L (ref 0–12)
WBC # BLD AUTO: 9.9 THOU/MM3 (ref 4.8–10.8)

## 2023-10-12 PROCEDURE — 93005 ELECTROCARDIOGRAM TRACING: CPT | Performed by: EMERGENCY MEDICINE

## 2023-10-12 PROCEDURE — 85025 COMPLETE CBC W/AUTO DIFF WBC: CPT

## 2023-10-12 PROCEDURE — 84484 ASSAY OF TROPONIN QUANT: CPT

## 2023-10-12 PROCEDURE — 71045 X-RAY EXAM CHEST 1 VIEW: CPT

## 2023-10-12 PROCEDURE — 93005 ELECTROCARDIOGRAM TRACING: CPT | Performed by: NURSE PRACTITIONER

## 2023-10-12 PROCEDURE — 80048 BASIC METABOLIC PNL TOTAL CA: CPT

## 2023-10-12 PROCEDURE — 99223 1ST HOSP IP/OBS HIGH 75: CPT

## 2023-10-12 PROCEDURE — 93010 ELECTROCARDIOGRAM REPORT: CPT | Performed by: INTERNAL MEDICINE

## 2023-10-12 PROCEDURE — 99215 OFFICE O/P EST HI 40 MIN: CPT

## 2023-10-12 PROCEDURE — 99285 EMERGENCY DEPT VISIT HI MDM: CPT

## 2023-10-12 PROCEDURE — 83880 ASSAY OF NATRIURETIC PEPTIDE: CPT

## 2023-10-12 PROCEDURE — G0378 HOSPITAL OBSERVATION PER HR: HCPCS

## 2023-10-12 PROCEDURE — 36415 COLL VENOUS BLD VENIPUNCTURE: CPT

## 2023-10-12 RX ORDER — ACETAMINOPHEN 650 MG/1
650 SUPPOSITORY RECTAL EVERY 6 HOURS PRN
Status: DISCONTINUED | OUTPATIENT
Start: 2023-10-12 | End: 2023-10-14 | Stop reason: HOSPADM

## 2023-10-12 RX ORDER — MULTIVITAMIN WITH IRON
1 TABLET ORAL DAILY
Status: DISCONTINUED | OUTPATIENT
Start: 2023-10-13 | End: 2023-10-14 | Stop reason: HOSPADM

## 2023-10-12 RX ORDER — LISINOPRIL 10 MG/1
10 TABLET ORAL DAILY
Status: DISCONTINUED | OUTPATIENT
Start: 2023-10-13 | End: 2023-10-14 | Stop reason: HOSPADM

## 2023-10-12 RX ORDER — ENOXAPARIN SODIUM 100 MG/ML
40 INJECTION SUBCUTANEOUS DAILY
Status: DISCONTINUED | OUTPATIENT
Start: 2023-10-13 | End: 2023-10-14 | Stop reason: HOSPADM

## 2023-10-12 RX ORDER — POTASSIUM CHLORIDE 7.45 MG/ML
10 INJECTION INTRAVENOUS PRN
Status: DISCONTINUED | OUTPATIENT
Start: 2023-10-12 | End: 2023-10-14 | Stop reason: HOSPADM

## 2023-10-12 RX ORDER — POLYETHYLENE GLYCOL 3350 17 G/17G
17 POWDER, FOR SOLUTION ORAL DAILY PRN
Status: DISCONTINUED | OUTPATIENT
Start: 2023-10-12 | End: 2023-10-14 | Stop reason: HOSPADM

## 2023-10-12 RX ORDER — ASPIRIN 81 MG/1
81 TABLET, CHEWABLE ORAL DAILY
Status: DISCONTINUED | OUTPATIENT
Start: 2023-10-13 | End: 2023-10-14 | Stop reason: HOSPADM

## 2023-10-12 RX ORDER — SODIUM CHLORIDE 0.9 % (FLUSH) 0.9 %
5-40 SYRINGE (ML) INJECTION EVERY 12 HOURS SCHEDULED
Status: DISCONTINUED | OUTPATIENT
Start: 2023-10-12 | End: 2023-10-14 | Stop reason: HOSPADM

## 2023-10-12 RX ORDER — POTASSIUM CHLORIDE 20 MEQ/1
40 TABLET, EXTENDED RELEASE ORAL PRN
Status: DISCONTINUED | OUTPATIENT
Start: 2023-10-12 | End: 2023-10-14 | Stop reason: HOSPADM

## 2023-10-12 RX ORDER — DILTIAZEM HYDROCHLORIDE 120 MG/1
120 CAPSULE, COATED, EXTENDED RELEASE ORAL DAILY
Status: DISCONTINUED | OUTPATIENT
Start: 2023-10-13 | End: 2023-10-14 | Stop reason: HOSPADM

## 2023-10-12 RX ORDER — SODIUM CHLORIDE 9 MG/ML
INJECTION, SOLUTION INTRAVENOUS PRN
Status: DISCONTINUED | OUTPATIENT
Start: 2023-10-12 | End: 2023-10-14 | Stop reason: HOSPADM

## 2023-10-12 RX ORDER — ACETAMINOPHEN 325 MG/1
650 TABLET ORAL EVERY 6 HOURS PRN
Status: DISCONTINUED | OUTPATIENT
Start: 2023-10-12 | End: 2023-10-13

## 2023-10-12 RX ORDER — ONDANSETRON 4 MG/1
4 TABLET, ORALLY DISINTEGRATING ORAL EVERY 8 HOURS PRN
Status: DISCONTINUED | OUTPATIENT
Start: 2023-10-12 | End: 2023-10-14 | Stop reason: HOSPADM

## 2023-10-12 RX ORDER — ONDANSETRON 2 MG/ML
4 INJECTION INTRAMUSCULAR; INTRAVENOUS EVERY 6 HOURS PRN
Status: DISCONTINUED | OUTPATIENT
Start: 2023-10-12 | End: 2023-10-14 | Stop reason: HOSPADM

## 2023-10-12 RX ORDER — ATORVASTATIN CALCIUM 20 MG/1
20 TABLET, FILM COATED ORAL DAILY
Status: DISCONTINUED | OUTPATIENT
Start: 2023-10-13 | End: 2023-10-14 | Stop reason: HOSPADM

## 2023-10-12 RX ORDER — MAGNESIUM SULFATE IN WATER 40 MG/ML
2000 INJECTION, SOLUTION INTRAVENOUS PRN
Status: DISCONTINUED | OUTPATIENT
Start: 2023-10-12 | End: 2023-10-14 | Stop reason: HOSPADM

## 2023-10-12 RX ORDER — SODIUM CHLORIDE 0.9 % (FLUSH) 0.9 %
5-40 SYRINGE (ML) INJECTION PRN
Status: DISCONTINUED | OUTPATIENT
Start: 2023-10-12 | End: 2023-10-14 | Stop reason: HOSPADM

## 2023-10-12 ASSESSMENT — ENCOUNTER SYMPTOMS
ABDOMINAL PAIN: 0
SHORTNESS OF BREATH: 0
DIARRHEA: 0
COUGH: 0
NAUSEA: 0
VOMITING: 0

## 2023-10-12 ASSESSMENT — PAIN DESCRIPTION - DESCRIPTORS
DESCRIPTORS: DULL
DESCRIPTORS: SHARP;ACHING
DESCRIPTORS: ACHING;DULL

## 2023-10-12 ASSESSMENT — PAIN - FUNCTIONAL ASSESSMENT
PAIN_FUNCTIONAL_ASSESSMENT: 0-10

## 2023-10-12 ASSESSMENT — PAIN SCALES - GENERAL
PAINLEVEL_OUTOF10: 0
PAINLEVEL_OUTOF10: 3
PAINLEVEL_OUTOF10: 1
PAINLEVEL_OUTOF10: 3

## 2023-10-12 ASSESSMENT — PAIN DESCRIPTION - PAIN TYPE: TYPE: ACUTE PAIN

## 2023-10-12 ASSESSMENT — PAIN DESCRIPTION - ORIENTATION: ORIENTATION: MID

## 2023-10-12 ASSESSMENT — PAIN DESCRIPTION - LOCATION
LOCATION: CHEST

## 2023-10-12 NOTE — ED NOTES
To EMS via gurney with EMS personnel. Pt does state chest pain improved and is now a dull ache 3/10. No SOB noted. Skin is warm and dry.        Dennis Romero RN  10/12/23 1414

## 2023-10-12 NOTE — ED PROVIDER NOTES
[facility] DEPT  EMERGENCY DEPARTMENT ENCOUNTER      Pt Name: Rae Zafar  MRN: [id number]  [address] [birth year]  Date of evaluation: 10/12/2023  Resident Physician: Devin Damon DO  Supervising Physician: Dr. Fede De DO    [address]       Chief Complaint   Patient presents with    Chest Pain       HISTORY OF PRESENT ILLNESS    Rae Zafar is a 80 y.o. female who presents to the emergency department for evaluation of chest pain   Past medical history of HTN, HLD, GERD, CAD sp stent in 2017. Patient was at [facility] around noon she chest pain to urgent care was sent via EMS to the ED. She received no nitro or ASA on route patient reports her chest pain has improve its not there. Contacted cardiology Dr. Ashby Oas office this morning they had recommended to go urgent care. Patient reports her chest pain was with no SOB, headache, no nausea/vomiting, no abdominal pain, no urinary symptoms. No swelling. Patient lives at home alone, daughter and son in law at beside. She takes 81 mg of ASA and Brilinta. Echo 11/21 EF 60%    The patient has no other acute complaints at this time. PAST MEDICAL AND SURGICAL HISTORY     Past Medical History:   Diagnosis Date    Coronary artery disease involving native coronary artery of native heart without angina pectoris 5/5/2017    Gall stones     gall bladder removed in the 1970s    GERD (gastroesophageal reflux disease)     Hyperlipidemia     Hypertension     Osteoporosis      Past Surgical History:   Procedure Laterality Date    CARDIAC CATHETERIZATION  2017    with stent    CHOLECYSTECTOMY  01/01/1990    EYE SURGERY  01/01/2010    KNEE ARTHROSCOPY  2010, 2011    KNEE SURGERY Left 04/01/2015    OTHER SURGICAL HISTORY  05/01/2012    Patient had skin removed from eye lid    TONSILLECTOMY  01/01/1958       MEDICATIONS   No current facility-administered medications for this encounter.     Current Outpatient Medications:     dilTIAZem WITH AUTO DIFFERENTIAL - Abnormal; Notable for the following components:    RDW-SD 45.6 (*)     MPV 9.3 (*)     All other components within normal limits   BRAIN NATRIURETIC PEPTIDE   TROPONIN   ANION GAP   OSMOLALITY   GLOMERULAR FILTRATION RATE, ESTIMATED   TROPONIN     All laboratory results are individually reviewed and interpreted by me. See ED course below for results interpretation if applicable. (Any cultures that may have been sent were not resulted at the time of this patient ED visit)    Radiologic studies results available at the moment of this note (None if blank):  XR CHEST PORTABLE   Final Result   1. No acute cardiopulmonary abnormality. This document has been electronically signed by: Yamilet Hart MD on    10/12/2023 06:20 PM        See ED course below for my interpretation if applicable. All radiology images independently reviewed by me in addition to interpretation provided by the radiologist.    EKG interpretation (none if blank):  normal sinus rhythm, unchanged from previous tracings on external record review  All EKG results are individually reviewed and interpreted by me. All EKGs are also interpreted by our Cardiology department, final interpretation may not be available as of the writing of this note. INITIAL ASSESSMENT   Comorbid conditions pertinent to this ED encounter:  Not applicable    Differential Diagnosis includes but is not limited to:  MI/ACS, arrhythmia, pulmonary effusion/edema, GERD      Decision Rules/Clinical Scores utilized:   Heart score  . PREVIOUS RECORDS  AND EXTERNAL INFORMATION REVIEWED   History obtained from: chart review and the patient. Pertinent previous and/or external records reviewed: Noncontributory.     Case discussed with specialties other than Emergency Medicine: Hospitalist    ED COURSE   ED Medications administered this visit (None if left blank):   Medications - No data to display    ED Course as of 10/12/23 2020   Thu Oct 12, 2023

## 2023-10-12 NOTE — ED NOTES
Pt moved from stretcher to cot. Pt arrived via EMS from  with c/o chest pain onset while driving. EKG from  arrives with pt, reading NSR. Pt rating pain 1/10 and describes pain as dull. Pt denies trying anything for her pain PTA.  20G IV established in [facility] upon arrival.      Jamel Dow RN  10/12/23 1444

## 2023-10-12 NOTE — ED NOTES
Report called to Vinnie Lindsey at [facility] ER regarding transfer     Axel Amena, [address]  10/12/23 0471

## 2023-10-12 NOTE — ED TRIAGE NOTES
Chest pain onset approximately 30 minutes ago. (Pt states she was just coming back from grocery shopping) Pt also states she had an episode of sharp chest pain this am while getting dressed lasted approximately 5 minutes. Pt is A & O x 3. Skin is warm and dry. 12 lead EKG completed and provider to bedside.

## 2023-10-12 NOTE — ED NOTES
[facility] notified of needed transport to er     Juan Palomino, [address]  10/12/23 [phone]

## 2023-10-12 NOTE — TELEPHONE ENCOUNTER
Pt states she is having pain between the breast that was been going on for about 15 minutes, pt was instructed to go to er or urgent care to get checked, she verbally understands but wanted to know if you want her to do anything else ?

## 2023-10-12 NOTE — ED NOTES
Pt resting on cot, in no apparent distress. Family at bedside. Pt denies needs at this time.       Raquel Gomez RN  10/12/23 9997

## 2023-10-12 NOTE — ED NOTES
Dr Demetrius Oleary at bedside updating pt on [address] [facility], [address] [place] [address]  10/12/23 1930

## 2023-10-13 ENCOUNTER — APPOINTMENT (OUTPATIENT)
Dept: NON INVASIVE DIAGNOSTICS | Age: 83
End: 2023-10-13
Payer: MEDICARE

## 2023-10-13 ENCOUNTER — APPOINTMENT (OUTPATIENT)
Dept: ULTRASOUND IMAGING | Age: 83
End: 2023-10-13
Payer: MEDICARE

## 2023-10-13 LAB
ALBUMIN SERPL BCG-MCNC: 4 G/DL (ref 3.5–5.1)
ALP SERPL-CCNC: 80 U/L (ref 38–126)
ALT SERPL W/O P-5'-P-CCNC: 39 U/L (ref 11–66)
ANION GAP SERPL CALC-SCNC: 13 MEQ/L (ref 8–16)
AST SERPL-CCNC: 44 U/L (ref 5–40)
BASOPHILS ABSOLUTE: 0.1 THOU/MM3 (ref 0–0.1)
BASOPHILS NFR BLD AUTO: 0.9 %
BILIRUB CONJ SERPL-MCNC: < 0.2 MG/DL (ref 0–0.3)
BILIRUB SERPL-MCNC: 0.6 MG/DL (ref 0.3–1.2)
BUN SERPL-MCNC: 18 MG/DL (ref 7–22)
CALCIUM SERPL-MCNC: 10 MG/DL (ref 8.5–10.5)
CHLORIDE SERPL-SCNC: 108 MEQ/L (ref 98–111)
CHOLEST SERPL-MCNC: 153 MG/DL (ref 100–199)
CO2 SERPL-SCNC: 21 MEQ/L (ref 23–33)
CREAT SERPL-MCNC: 0.4 MG/DL (ref 0.4–1.2)
DEPRECATED MEAN GLUCOSE BLD GHB EST-ACNC: 105 MG/DL (ref 70–126)
DEPRECATED RDW RBC AUTO: 46.1 FL (ref 35–45)
EKG ATRIAL RATE: 58 BPM
EKG P AXIS: 44 DEGREES
EKG P-R INTERVAL: 162 MS
EKG Q-T INTERVAL: 430 MS
EKG QRS DURATION: 90 MS
EKG QTC CALCULATION (BAZETT): 422 MS
EKG R AXIS: 10 DEGREES
EKG T AXIS: 21 DEGREES
EKG VENTRICULAR RATE: 58 BPM
EOSINOPHIL NFR BLD AUTO: 2.9 %
EOSINOPHILS ABSOLUTE: 0.2 THOU/MM3 (ref 0–0.4)
ERYTHROCYTE [DISTWIDTH] IN BLOOD BY AUTOMATED COUNT: 13.5 % (ref 11.5–14.5)
GFR SERPL CREATININE-BSD FRML MDRD: > 60 ML/MIN/1.73M2
GLUCOSE SERPL-MCNC: 97 MG/DL (ref 70–108)
HBA1C MFR BLD HPLC: 5.5 % (ref 4.4–6.4)
HCT VFR BLD AUTO: 40.3 % (ref 37–47)
HDLC SERPL-MCNC: 46 MG/DL
HGB BLD-MCNC: 12.9 GM/DL (ref 12–16)
IMM GRANULOCYTES # BLD AUTO: 0.02 THOU/MM3 (ref 0–0.07)
IMM GRANULOCYTES NFR BLD AUTO: 0.3 %
LDLC SERPL CALC-MCNC: 76 MG/DL
LYMPHOCYTES ABSOLUTE: 1.6 THOU/MM3 (ref 1–4.8)
LYMPHOCYTES NFR BLD AUTO: 21 %
MCH RBC QN AUTO: 29.7 PG (ref 26–33)
MCHC RBC AUTO-ENTMCNC: 32 GM/DL (ref 32.2–35.5)
MCV RBC AUTO: 92.6 FL (ref 81–99)
MONOCYTES ABSOLUTE: 0.7 THOU/MM3 (ref 0.4–1.3)
MONOCYTES NFR BLD AUTO: 9.9 %
NEUTROPHILS NFR BLD AUTO: 65 %
NRBC BLD AUTO-RTO: 0 /100 WBC
PLATELET # BLD AUTO: 277 THOU/MM3 (ref 130–400)
PMV BLD AUTO: 9.4 FL (ref 9.4–12.4)
POTASSIUM SERPL-SCNC: 4.1 MEQ/L (ref 3.5–5.2)
PROT SERPL-MCNC: 6.3 G/DL (ref 6.1–8)
RBC # BLD AUTO: 4.35 MILL/MM3 (ref 4.2–5.4)
SEGMENTED NEUTROPHILS ABSOLUTE COUNT: 4.9 THOU/MM3 (ref 1.8–7.7)
SODIUM SERPL-SCNC: 142 MEQ/L (ref 135–145)
TRIGL SERPL-MCNC: 157 MG/DL (ref 0–199)
WBC # BLD AUTO: 7.5 THOU/MM3 (ref 4.8–10.8)

## 2023-10-13 PROCEDURE — 80061 LIPID PANEL: CPT

## 2023-10-13 PROCEDURE — G0378 HOSPITAL OBSERVATION PER HR: HCPCS

## 2023-10-13 PROCEDURE — 6370000000 HC RX 637 (ALT 250 FOR IP): Performed by: INTERNAL MEDICINE

## 2023-10-13 PROCEDURE — 6370000000 HC RX 637 (ALT 250 FOR IP)

## 2023-10-13 PROCEDURE — 78452 HT MUSCLE IMAGE SPECT MULT: CPT | Performed by: NUCLEAR MEDICINE

## 2023-10-13 PROCEDURE — 76705 ECHO EXAM OF ABDOMEN: CPT

## 2023-10-13 PROCEDURE — 93017 CV STRESS TEST TRACING ONLY: CPT | Performed by: NUCLEAR MEDICINE

## 2023-10-13 PROCEDURE — 2580000003 HC RX 258

## 2023-10-13 PROCEDURE — 80048 BASIC METABOLIC PNL TOTAL CA: CPT

## 2023-10-13 PROCEDURE — 93010 ELECTROCARDIOGRAM REPORT: CPT | Performed by: INTERNAL MEDICINE

## 2023-10-13 PROCEDURE — 6360000002 HC RX W HCPCS

## 2023-10-13 PROCEDURE — 93005 ELECTROCARDIOGRAM TRACING: CPT

## 2023-10-13 PROCEDURE — 99223 1ST HOSP IP/OBS HIGH 75: CPT | Performed by: NUCLEAR MEDICINE

## 2023-10-13 PROCEDURE — 85025 COMPLETE CBC W/AUTO DIFF WBC: CPT

## 2023-10-13 PROCEDURE — A9500 TC99M SESTAMIBI: HCPCS

## 2023-10-13 PROCEDURE — 83036 HEMOGLOBIN GLYCOSYLATED A1C: CPT

## 2023-10-13 PROCEDURE — 80076 HEPATIC FUNCTION PANEL: CPT

## 2023-10-13 PROCEDURE — 36415 COLL VENOUS BLD VENIPUNCTURE: CPT

## 2023-10-13 PROCEDURE — 1002F ASSESS ANGINAL SYMPTOM/LEVEL: CPT | Performed by: INTERNAL MEDICINE

## 2023-10-13 PROCEDURE — 3430000000 HC RX DIAGNOSTIC RADIOPHARMACEUTICAL

## 2023-10-13 RX ORDER — ACETAMINOPHEN 500 MG
1000 TABLET ORAL EVERY 8 HOURS PRN
Status: DISCONTINUED | OUTPATIENT
Start: 2023-10-13 | End: 2023-10-14 | Stop reason: HOSPADM

## 2023-10-13 RX ORDER — TETRAKIS(2-METHOXYISOBUTYLISOCYANIDE)COPPER(I) TETRAFLUOROBORATE 1 MG/ML
9.3 INJECTION, POWDER, LYOPHILIZED, FOR SOLUTION INTRAVENOUS
Status: COMPLETED | OUTPATIENT
Start: 2023-10-13 | End: 2023-10-13

## 2023-10-13 RX ORDER — TETRAKIS(2-METHOXYISOBUTYLISOCYANIDE)COPPER(I) TETRAFLUOROBORATE 1 MG/ML
34.5 INJECTION, POWDER, LYOPHILIZED, FOR SOLUTION INTRAVENOUS
Status: COMPLETED | OUTPATIENT
Start: 2023-10-13 | End: 2023-10-13

## 2023-10-13 RX ORDER — OXYBUTYNIN CHLORIDE 5 MG/1
5 TABLET ORAL 2 TIMES DAILY
Status: DISCONTINUED | OUTPATIENT
Start: 2023-10-13 | End: 2023-10-14 | Stop reason: HOSPADM

## 2023-10-13 RX ADMIN — SODIUM CHLORIDE, PRESERVATIVE FREE 10 ML: 5 INJECTION INTRAVENOUS at 20:47

## 2023-10-13 RX ADMIN — Medication 34.5 MILLICURIE: at 10:55

## 2023-10-13 RX ADMIN — ACETAMINOPHEN 1000 MG: 500 TABLET ORAL at 14:45

## 2023-10-13 RX ADMIN — TICAGRELOR 60 MG: 60 TABLET ORAL at 07:39

## 2023-10-13 RX ADMIN — SODIUM CHLORIDE, PRESERVATIVE FREE 10 ML: 5 INJECTION INTRAVENOUS at 01:43

## 2023-10-13 RX ADMIN — LISINOPRIL 10 MG: 10 TABLET ORAL at 07:40

## 2023-10-13 RX ADMIN — OXYBUTYNIN CHLORIDE 5 MG: 5 TABLET ORAL at 16:38

## 2023-10-13 RX ADMIN — DILTIAZEM HYDROCHLORIDE 120 MG: 120 CAPSULE, COATED, EXTENDED RELEASE ORAL at 14:22

## 2023-10-13 RX ADMIN — TICAGRELOR 60 MG: 60 TABLET ORAL at 20:48

## 2023-10-13 RX ADMIN — ACETAMINOPHEN 650 MG: 325 TABLET ORAL at 01:37

## 2023-10-13 RX ADMIN — METOPROLOL TARTRATE 12.5 MG: 25 TABLET, FILM COATED ORAL at 07:39

## 2023-10-13 RX ADMIN — ASPIRIN 81 MG CHEWABLE TABLET 81 MG: 81 TABLET CHEWABLE at 07:39

## 2023-10-13 RX ADMIN — OXYBUTYNIN CHLORIDE 5 MG: 5 TABLET ORAL at 20:47

## 2023-10-13 RX ADMIN — Medication 9.3 MILLICURIE: at 10:05

## 2023-10-13 ASSESSMENT — PAIN SCALES - GENERAL
PAINLEVEL_OUTOF10: 2
PAINLEVEL_OUTOF10: 1
PAINLEVEL_OUTOF10: 1
PAINLEVEL_OUTOF10: 0

## 2023-10-13 ASSESSMENT — PAIN DESCRIPTION - ORIENTATION
ORIENTATION: RIGHT;ANTERIOR

## 2023-10-13 ASSESSMENT — PAIN DESCRIPTION - DESCRIPTORS
DESCRIPTORS: ACHING;DISCOMFORT
DESCRIPTORS: ACHING
DESCRIPTORS: ACHING;DISCOMFORT

## 2023-10-13 ASSESSMENT — PAIN DESCRIPTION - FREQUENCY: FREQUENCY: INTERMITTENT

## 2023-10-13 ASSESSMENT — PAIN - FUNCTIONAL ASSESSMENT
PAIN_FUNCTIONAL_ASSESSMENT: ACTIVITIES ARE NOT PREVENTED
PAIN_FUNCTIONAL_ASSESSMENT: ACTIVITIES ARE NOT PREVENTED

## 2023-10-13 ASSESSMENT — PAIN DESCRIPTION - ONSET: ONSET: ON-GOING

## 2023-10-13 ASSESSMENT — PAIN DESCRIPTION - PAIN TYPE
TYPE: CHRONIC PAIN
TYPE: CHRONIC PAIN

## 2023-10-13 ASSESSMENT — PAIN DESCRIPTION - LOCATION
LOCATION: KNEE

## 2023-10-13 NOTE — ED NOTES
ED to inpatient nurses report    Chief Complaint   Patient presents with    Chest Pain      Present to ED from home  LOC: alert and orientated to name, place, date  Vital signs   Vitals:    10/12/23 1708 10/12/23 1826 10/12/23 1929 10/12/23 2039   BP: (!) 170/77 (!) 165/68 (!) 150/84 (!) 148/82   Pulse: 87 67 76 91   Resp: 20 16 20 17   Temp:       TempSrc:       SpO2: 96% 96% 98% 97%   Weight:       Height:          Oxygen Baseline RA    Current needs required RA   LDAs:   Peripheral IV 10/12/23 Right Antecubital (Active)   Site Assessment Clean, dry & intact 10/12/23 2040   Line Status Normal saline locked 10/12/23 2040   Phlebitis Assessment No symptoms 10/12/23 1646   Infiltration Assessment 0 10/12/23 1646     Mobility: Independent  Pending ED orders: none  Present condition: stable    C-SSRS Risk of Suicide: No Risk  Swallow Screening    Preferred Language: [place] [facility]     Electronically signed by Na Barbosa RN on 10/12/2023 at 8:48 PM       Robyn Blanchard  10/12/23 2048

## 2023-10-13 NOTE — ED NOTES
Pt ambulatory to bathroom at this time. No assistance required. Pt aware of plan to admit.       Makenzie Marquez RN  10/12/23 2043

## 2023-10-13 NOTE — CARE COORDINATION
Case Management Assessment  Initial Evaluation    Date/Time of Evaluation: 10/13/2023 1:13 PM  Assessment Completed by: Maile Bautista RN    If patient is discharged prior to next notation, then this note serves as note for discharge by case management. Patient Name: Birdie Stanley                   YOB: 1940  Diagnosis: Chest pain [R07.9]  Chest pain, unspecified type [R07.9]                   Date / Time: 10/12/2023  4:19 PM  Location: [address] [place] [address]     Patient Admission Status: Observation   Readmission Risk Low 0-14, Mod 15-19), High > 20: No data recorded  Current PCP: EDER Duke CNP  PCP verified by CM? Yes    Chart Reviewed: Yes      History Provided by: Patient  Patient Orientation: Alert and Oriented    Patient Cognition: Alert    Hospitalization in the last 30 days (Readmission):  No    If yes, Readmission Assessment in CM Navigator will be completed. Advance Directives:      Code Status: Full Code   Patient's Primary Decision Maker is: Legal Next of Kin    Primary Decision Maker: Nahed Chris - Child - [phone]    Secondary Decision Maker: Kingsley Salazar - Child - [phone]    Discharge Planning:    Patient lives with: Alone Type of Home: House  Primary Care Giver: Self  Patient Support Systems include: Children   Current Financial resources: Medicare, Other (Comment) (and [address])  Current community resources: None  Current services prior to admission: None            Current DME:              Type of Home Care services:  None    ADLS  Prior functional level: Independent in ADLs/IADLs  Current functional level: Independent in ADLs/IADLs    Family can provide assistance at DC: Yes  Would you like Case Management to discuss the discharge plan with any other family members/significant others, and if so, who?  No  Plans to Return to Present Housing: Yes  Other Identified Issues/Barriers to RETURNING to current housing: No  Potential Assistance needed at

## 2023-10-13 NOTE — H&P
Hospitalist History and Physical          Patient Info:   Name: Dutch Melendez, : 1940, PCP: Chanda Guevara, EDER - [facility]  Unit/Bed:A      Admitted on: 10/12/2023  Date of Service: Pt seen/examined on 10/12/23 and Admitted to Observation with expected LOS less than two midnights due to medical therapy. Assessment and Plan:  Chest pain: Patient complained of sharp epigastric/substernal pain at about 1500 today that resolved spontaneously. Denies shortness of breath, dizziness, and diaphoresis. Troponin negative x 2, Heart score 4. Likely GERD vs atypical chest pain unlikely from infectious source. Cardiology will see in am.  Lipid panel and hemoglobin a1c pending  Telemetry  Ekg in am    CAD s/p MADELIN: Patient was asymptomatic and diseased vessels were found incidentally with routine cath    GERD: resume PPI    Hyperlipidemia: resume statin    Hypertension: resume home medications with hold parameter      Data reviewed  EKG:  I have reviewed the EKG with the following interpretation: NSR with possible concerns of anterior infarct. Will recheck once get to floor. Imaging: I have reviewed Chest x-ray with the following interpretation: negative for acute processes      ===================================================================      Chief Complaint:  chest pain    History Of Present Illness:  Dutch Melendez is a 80 y.o. female with PMHx of CAD, hypertension, hyperlipidemia, GERD who presents to [address] with chest pain. Patient states that all she ate today was a boiled egg this am. She states that around 1500 she was driving and began to have chest pain. Upon describing the pain it was more epigastric in nature non-radiating and rated it a 10/10. She described the pain as more \"annoying. \" She states that the pain lasted for about an hour then resolved on its own. She states that she did call her Cardiologists office and was instructed to be seen.  She was transferred

## 2023-10-13 NOTE — ED NOTES
Pt updated on room assignment. Pt given PO water. Pt denies other needs at this time. Call light in reach.       Vidhi Vera RN  10/12/23 [id number]

## 2023-10-13 NOTE — PROGRESS NOTES
Pt admitted to  8AB31 via in a wheelchair from ED. Complaints: Chest pain / discomfort. IV none infusing into the antecubital right, condition patent and no redness at a rate of 0 mls/ hour. IV site free of s/s of infection or infiltration. Vital signs obtained. Assessment and data collection initiated. Two nurse skin assessment performed by MM RN and LUCILLE RN. Oriented to room. Policies and procedures for 8AB explained. All questions answered with no further questions at this time. Fall prevention and safety brochure discussed with patient. Bed alarm on. Call light in reach. Oriented to room. Jeni Weaver, RN, RN 10/13/2023 12:15 AM     Explained patients right to have family, representative or physician notified of their admission. Patient has Declined for physician to be notified. Patient has Declined for family/representative to be notified. Patient would like family notified once per shift?  No

## 2023-10-13 NOTE — PLAN OF CARE
Problem: Discharge Planning  Goal: Discharge to home or other facility with appropriate resources  Outcome: Progressing  Discharge to home or other facility with appropriate resources: Identify barriers to discharge with patient and caregiver     Problem: Pain  Goal: Verbalizes/displays adequate comfort level or baseline comfort level  Outcome: Progressing  Verbalizes/displays adequate comfort level or baseline comfort level:   Encourage patient to monitor pain and request assistance   Assess pain using appropriate pain scale     Problem: Safety - Adult  Goal: Free from fall injury  Outcome: Progressing  Note: Patient free of falls this shift. Patient on falling star program. Fall band intact. RN visually checks on patient with hourly rounds. Patient tolerates ambulation each shift. Problem: ABCDS Injury Assessment  Goal: Absence of physical injury  Outcome: Progressing  Absence of Physical Injury: Implement safety measures based on patient assessment     Care plan reviewed with patient. Patient verbalizes understanding of the plan of care and contribute to goal setting.

## 2023-10-13 NOTE — PROGRESS NOTES
Hospitalist Progress Note      Patient:  Tiffani Travis    Unit/Bed:8B-31/031-A  YOB: 1940  MRN: [id number]   Acct: [de-identified]   PCP: EDER Bradley - CNP  Date of Admission: 10/12/2023    Assessment/Plan:    #Chest pain. H/O CAD with MADELIN placement.  -Heart score 4. Lipid panel noted.  -Hg A1c 5.5.  -Cardiology consult note pending.  -Patient is getting nuclear stress test.Will follow up with results and cardiology recs. # H/O Hepatic cyst and elevated liver enzyme.  -Daughter raised concern about her liver issues and wanted to address that while she is in the hospital.She feels her symptoms are probably secondary to that. -US of liver and liver function test ordered. #GERD. -Continue with PPI. #HLD. -Continue with statin. #HTN. -Continue with home lisinopril and diltiazem. Subjective (past 24 hours):   Patient seen and examined at bed side. Reports no complaints. Chest pain she had earlier has resolved. Has no other complaints except concern for her liver cyst.    Past medical history, family history, social history and allergies reviewed again and is unchanged since admission. ROS (All review of systems completed. Pertinent positives noted.  Otherwise All other systems reviewed and negative.)     Medications:  Reviewed    Infusion Medications    sodium chloride       Scheduled Medications    aspirin  81 mg Oral Daily    atorvastatin  20 mg Oral Daily    oyster shell calcium w/D  1 tablet Oral Daily    dilTIAZem  120 mg Oral Daily    lisinopril  10 mg Oral Daily    metoprolol tartrate  12.5 mg Oral BID    multivitamin  1 tablet Oral Daily    ticagrelor  60 mg Oral BID    sodium chloride flush  5-40 mL IntraVENous 2 times per day    enoxaparin  40 mg SubCUTAneous Daily     PRN Meds: sodium chloride flush, sodium chloride, ondansetron **OR** ondansetron, polyethylene glycol, acetaminophen

## 2023-10-13 NOTE — PROGRESS NOTES
Pt was in bed as her daughter was with her. She was dealing with chest pain but was hopeful. She was encouraged and was anointed.     10/13/23 1711   Encounter Summary   Encounter Overview/Reason  Initial Encounter   Service Provided For: Patient   Referral/Consult From: [facility]   Support System Children   Last Encounter  10/13/23  (Anointed.)   Complexity of Encounter Low   Begin Time 1620   End Time  1628   Total Time Calculated 8 min   Spiritual/Emotional needs   Type Spiritual Support   Rituals, Rites and Sacraments   Type Anointing   Assessment/Intervention/Outcome   Assessment Hopeful   Intervention Empowerment   Outcome Encouraged;Engaged in conversation

## 2023-10-13 NOTE — CONSULTS
no depression/yelena, no pressured speech, normal affect  Lymph: No obvious LAD      Assessment and plan:  Chest pain    Atypical chest pain: Moderate risk patient given history of PCI. Troponin negative x2, 12 then 11. EKG is stable compared to previous EKG without signs of ischemia. She denies other episodes of chest pain or angina symptoms. Heart score of 4. Pt does have history of GERD and hiatal hernia. Negative stress test today 10/13/2023. Stress test done - negative   Continue home medications ASA 81 mg, Brillinta, and Lipitor 20 mg daily  CAD s/p PCI angioplasty and stenting of mid LAD: Pt takes ASA 81 mg, Brillinta, and Lipitor 20 mg daily. Continue home medications  3. History of Sinus Tachycardia w/ Palpitations: Holter monitor 2014 demonstrates sinus tach with PAC's. Pt is on Diltiazem 120 mg and Lopressor 12.5 mg. Continue home medications with hold parameters. 4. HTN: Pt is on Lisinopril 10 mg. Continue home medications. Thank you for allowing us to participate in the care of this patient. Please do not hesitate to call us with questions. Time spent reviewing notes, data, discussing with patient/family, and formulating plan with clinical documentation was approximately 80 minutes. Electronically signed by Viri Enriquez DO [facility] 10/13/2023 at 8:53 AM    Interventional Cardiology - [facility]     Patient seen and examined  Discussed with the team   Atypical presentation   Risk for CAD is moderate   No MI by ECG and enzymes  Non invasive cardiac testing will be ordered to further evaluate for any ischemic or structural heart disease as a cause of the patient symptoms. We will proceed with a Stress Cardiolite test and echo soon. Consider GI work up of the stress test is okay   Thank you for allowing me to participate in the care of your patient.  Please don't hesitate to contact me regarding any further issues related to the patient care    [facility] [address] [place] [address], MD

## 2023-10-14 VITALS
BODY MASS INDEX: 31.28 KG/M2 | TEMPERATURE: 98.5 F | OXYGEN SATURATION: 95 % | RESPIRATION RATE: 18 BRPM | DIASTOLIC BLOOD PRESSURE: 51 MMHG | HEART RATE: 50 BPM | SYSTOLIC BLOOD PRESSURE: 124 MMHG | HEIGHT: 60 IN | WEIGHT: 159.3 LBS

## 2023-10-14 LAB
ANION GAP SERPL CALC-SCNC: 12 MEQ/L (ref 8–16)
BASOPHILS ABSOLUTE: 0.1 THOU/MM3 (ref 0–0.1)
BASOPHILS NFR BLD AUTO: 0.9 %
BUN SERPL-MCNC: 21 MG/DL (ref 7–22)
CALCIUM SERPL-MCNC: 9.7 MG/DL (ref 8.5–10.5)
CHLORIDE SERPL-SCNC: 107 MEQ/L (ref 98–111)
CO2 SERPL-SCNC: 23 MEQ/L (ref 23–33)
CREAT SERPL-MCNC: 0.5 MG/DL (ref 0.4–1.2)
DEPRECATED RDW RBC AUTO: 45.9 FL (ref 35–45)
EOSINOPHIL NFR BLD AUTO: 3.4 %
EOSINOPHILS ABSOLUTE: 0.2 THOU/MM3 (ref 0–0.4)
ERYTHROCYTE [DISTWIDTH] IN BLOOD BY AUTOMATED COUNT: 13.5 % (ref 11.5–14.5)
GFR SERPL CREATININE-BSD FRML MDRD: > 60 ML/MIN/1.73M2
GLUCOSE SERPL-MCNC: 104 MG/DL (ref 70–108)
HCT VFR BLD AUTO: 38.7 % (ref 37–47)
HGB BLD-MCNC: 12.6 GM/DL (ref 12–16)
IMM GRANULOCYTES # BLD AUTO: 0.02 THOU/MM3 (ref 0–0.07)
IMM GRANULOCYTES NFR BLD AUTO: 0.3 %
LYMPHOCYTES ABSOLUTE: 1.8 THOU/MM3 (ref 1–4.8)
LYMPHOCYTES NFR BLD AUTO: 26.3 %
MCH RBC QN AUTO: 29.8 PG (ref 26–33)
MCHC RBC AUTO-ENTMCNC: 32.6 GM/DL (ref 32.2–35.5)
MCV RBC AUTO: 91.5 FL (ref 81–99)
MONOCYTES ABSOLUTE: 0.7 THOU/MM3 (ref 0.4–1.3)
MONOCYTES NFR BLD AUTO: 10.4 %
NEUTROPHILS NFR BLD AUTO: 58.7 %
NRBC BLD AUTO-RTO: 0 /100 WBC
PLATELET # BLD AUTO: 263 THOU/MM3 (ref 130–400)
PMV BLD AUTO: 9.3 FL (ref 9.4–12.4)
POTASSIUM SERPL-SCNC: 4.1 MEQ/L (ref 3.5–5.2)
RBC # BLD AUTO: 4.23 MILL/MM3 (ref 4.2–5.4)
SEGMENTED NEUTROPHILS ABSOLUTE COUNT: 4 THOU/MM3 (ref 1.8–7.7)
SODIUM SERPL-SCNC: 142 MEQ/L (ref 135–145)
WBC # BLD AUTO: 6.8 THOU/MM3 (ref 4.8–10.8)

## 2023-10-14 PROCEDURE — 6370000000 HC RX 637 (ALT 250 FOR IP)

## 2023-10-14 PROCEDURE — 85025 COMPLETE CBC W/AUTO DIFF WBC: CPT

## 2023-10-14 PROCEDURE — 80048 BASIC METABOLIC PNL TOTAL CA: CPT

## 2023-10-14 PROCEDURE — 6360000002 HC RX W HCPCS

## 2023-10-14 PROCEDURE — 36415 COLL VENOUS BLD VENIPUNCTURE: CPT

## 2023-10-14 PROCEDURE — G0378 HOSPITAL OBSERVATION PER HR: HCPCS

## 2023-10-14 PROCEDURE — 96372 THER/PROPH/DIAG INJ SC/IM: CPT

## 2023-10-14 PROCEDURE — 99239 HOSP IP/OBS DSCHRG MGMT >30: CPT | Performed by: INTERNAL MEDICINE

## 2023-10-14 PROCEDURE — 6370000000 HC RX 637 (ALT 250 FOR IP): Performed by: INTERNAL MEDICINE

## 2023-10-14 RX ADMIN — ACETAMINOPHEN 1000 MG: 500 TABLET ORAL at 09:04

## 2023-10-14 RX ADMIN — DILTIAZEM HYDROCHLORIDE 120 MG: 120 CAPSULE, COATED, EXTENDED RELEASE ORAL at 09:06

## 2023-10-14 RX ADMIN — ASPIRIN 81 MG CHEWABLE TABLET 81 MG: 81 TABLET CHEWABLE at 09:05

## 2023-10-14 RX ADMIN — METOPROLOL TARTRATE 12.5 MG: 25 TABLET, FILM COATED ORAL at 09:07

## 2023-10-14 RX ADMIN — ENOXAPARIN SODIUM 40 MG: 100 INJECTION SUBCUTANEOUS at 09:04

## 2023-10-14 RX ADMIN — Medication 1 TABLET: at 09:05

## 2023-10-14 RX ADMIN — TICAGRELOR 60 MG: 60 TABLET ORAL at 09:08

## 2023-10-14 RX ADMIN — ACETAMINOPHEN 1000 MG: 500 TABLET ORAL at 00:13

## 2023-10-14 RX ADMIN — OXYBUTYNIN CHLORIDE 5 MG: 5 TABLET ORAL at 09:06

## 2023-10-14 RX ADMIN — POLYETHYLENE GLYCOL 3350 17 G: 17 POWDER, FOR SOLUTION ORAL at 09:03

## 2023-10-14 RX ADMIN — LISINOPRIL 10 MG: 10 TABLET ORAL at 09:06

## 2023-10-14 ASSESSMENT — PAIN SCALES - GENERAL
PAINLEVEL_OUTOF10: 4
PAINLEVEL_OUTOF10: 0
PAINLEVEL_OUTOF10: 0
PAINLEVEL_OUTOF10: 3

## 2023-10-14 ASSESSMENT — PAIN - FUNCTIONAL ASSESSMENT: PAIN_FUNCTIONAL_ASSESSMENT: ACTIVITIES ARE NOT PREVENTED

## 2023-10-14 ASSESSMENT — PAIN DESCRIPTION - ORIENTATION: ORIENTATION: RIGHT

## 2023-10-14 ASSESSMENT — PAIN DESCRIPTION - ONSET: ONSET: ON-GOING

## 2023-10-14 ASSESSMENT — PAIN DESCRIPTION - FREQUENCY: FREQUENCY: CONTINUOUS

## 2023-10-14 ASSESSMENT — PAIN DESCRIPTION - PAIN TYPE: TYPE: CHRONIC PAIN

## 2023-10-14 ASSESSMENT — PAIN DESCRIPTION - DESCRIPTORS: DESCRIPTORS: ACHING

## 2023-10-14 ASSESSMENT — PAIN DESCRIPTION - LOCATION: LOCATION: KNEE

## 2023-10-14 NOTE — DISCHARGE SUMMARY
Date: 10/13/2023  Value: 32.0 (L)    Ref range: 32.2 - 35.5 gm/dl  Status: Final  RDW-CV                                        Date: 10/13/2023  Value: 13.5        Ref range: 11.5 - 14.5 %      Status: Final  RDW-SD                                        Date: 10/13/2023  Value: 46.1 (H)    Ref range: 35.0 - 45.0 fL     Status: Final  Platelets                                     Date: 10/13/2023  Value: 277         Ref range: 130 - 400 thou/m*  Status: Final  MPV                                           Date: 10/13/2023  Value: 9.4         Ref range: 9.4 - 12.4 fL      Status: Final  Seg Neutrophils                               Date: 10/13/2023  Value: 65.0        Ref range: %                  Status: Final  Lymphocytes                                   Date: 10/13/2023  Value: 21.0        Ref range: %                  Status: Final  Monocytes                                     Date: 10/13/2023  Value: 9.9         Ref range: %                  Status: Final  Eosinophils                                   Date: 10/13/2023  Value: 2.9         Ref range: %                  Status: Final  Basophils                                     Date: 10/13/2023  Value: 0.9         Ref range: %                  Status: Final  Immature Granulocytes                         Date: 10/13/2023  Value: 0.3         Ref range: %                  Status: Final  Segs Absolute                                 Date: 10/13/2023  Value: 4.9         Ref range: 1.8 - 7.7 thou/m*  Status: Final  Lymphocytes Absolute                          Date: 10/13/2023  Value: 1.6         Ref range: 1.0 - 4.8 thou/m*  Status: Final  Monocytes Absolute                            Date: 10/13/2023  Value: 0.7         Ref range: 0.4 - 1.3 thou/m*  Status: Final  Eosinophils Absolute                          Date: 10/13/2023  Value: 0.2         Ref range: 0.0 - 0.4 thou/m*  Status: Final  Basophils Absolute Status: Final  Lymphocytes                                   Date: 10/14/2023  Value: 26.3        Ref range: %                  Status: Final  Monocytes                                     Date: 10/14/2023  Value: 10.4        Ref range: %                  Status: Final  Eosinophils                                   Date: 10/14/2023  Value: 3.4         Ref range: %                  Status: Final  Basophils                                     Date: 10/14/2023  Value: 0.9         Ref range: %                  Status: Final  Immature Granulocytes                         Date: 10/14/2023  Value: 0.3         Ref range: %                  Status: Final  Segs Absolute                                 Date: 10/14/2023  Value: 4.0         Ref range: 1.8 - 7.7 thou/m*  Status: Final  Lymphocytes Absolute                          Date: 10/14/2023  Value: 1.8         Ref range: 1.0 - 4.8 thou/m*  Status: Final  Monocytes Absolute                            Date: 10/14/2023  Value: 0.7         Ref range: 0.4 - 1.3 thou/m*  Status: Final  Eosinophils Absolute                          Date: 10/14/2023  Value: 0.2         Ref range: 0.0 - 0.4 thou/m*  Status: Final  Basophils Absolute                            Date: 10/14/2023  Value: 0.1         Ref range: 0.0 - 0.1 thou/m*  Status: Final  Immature Grans (Abs)                          Date: 10/14/2023  Value: 0.02        Ref range: 0.00 - 0.07 thou*  Status: Final  nRBC                                          Date: 10/14/2023  Value: 0           Ref range: /100 wbc           Status: Final                Comment: Performed at [address], [address]  Anion Gap                                     Date: 10/14/2023  Value: 12.0        Ref range: 8.0 - 16.0 meq/L   Status: Final                Comment: ANION GAP = Sodium -(Chloride + CO2)  Performed at [address], [address]    Est, [place] Rate

## 2023-10-14 NOTE — PROGRESS NOTES
Educated on discharge instructions, medications, and follow up appointments. No further questions or concerns voiced. Discharged to home with son.

## 2023-10-16 ENCOUNTER — CARE COORDINATION (OUTPATIENT)
Dept: CASE MANAGEMENT | Age: 83
End: 2023-10-16

## 2023-10-16 LAB
EKG ATRIAL RATE: 58 BPM
EKG ATRIAL RATE: 74 BPM
EKG ATRIAL RATE: 86 BPM
EKG P AXIS: 44 DEGREES
EKG P AXIS: 53 DEGREES
EKG P AXIS: 63 DEGREES
EKG P-R INTERVAL: 142 MS
EKG P-R INTERVAL: 148 MS
EKG P-R INTERVAL: 162 MS
EKG Q-T INTERVAL: 372 MS
EKG Q-T INTERVAL: 376 MS
EKG Q-T INTERVAL: 430 MS
EKG QRS DURATION: 90 MS
EKG QRS DURATION: 92 MS
EKG QRS DURATION: 92 MS
EKG QTC CALCULATION (BAZETT): 417 MS
EKG QTC CALCULATION (BAZETT): 422 MS
EKG QTC CALCULATION (BAZETT): 445 MS
EKG R AXIS: 10 DEGREES
EKG R AXIS: 21 DEGREES
EKG R AXIS: 32 DEGREES
EKG T AXIS: 21 DEGREES
EKG T AXIS: 46 DEGREES
EKG T AXIS: 66 DEGREES
EKG VENTRICULAR RATE: 58 BPM
EKG VENTRICULAR RATE: 74 BPM
EKG VENTRICULAR RATE: 86 BPM

## 2023-10-16 NOTE — CARE COORDINATION
Received incoming call from pt. Left message stating she missed the call and is calling back. She can be reached at [phone]. CTN attempted to call pt back for the initial care transition follow up. Unable to reach pt. Left message with contact information and request for call back.      Amanda Bautista, RN  Care Transition Nurse

## 2023-10-16 NOTE — CARE COORDINATION
Care Transitions Outreach Attempt    Call within 2 business days of discharge: Yes   Attempted to reach patient for transitions of care follow up. Unable to reach patient. Patient: May Rodrigues Patient : 1940 MRN: <[id number]>    Last Discharge Facility       Date Complaint Diagnosis Description Type Department Provider    10/12/23 Chest Pain Chest pain, unspecified type . .. ED to Hosp-Admission (Discharged) (ADMITTED) [facility] Debi Del Toro MD; Yolanda Gallo, . .. Was this an external facility discharge? No Discharge Facility Name: [facility]    Noted following upcoming appointments from discharge chart review:   [facility] follow up appointment(s):   Future Appointments   Date Time Provider [address]   2023 11:15 AM Irwin Morales MD N [facility]   2024  1:20 PM Trenton Obregon MD [facility] Physic [facility]     [facility]  follow up appointment(s): PCP appt on 10/30/23    1st attempt to contact pt for initial care transition follow up. Unable to reach pt. Left message with contact information and request for call back.       Susanna Metzger RN  Care Transition Nurse

## 2023-10-17 ENCOUNTER — CARE COORDINATION (OUTPATIENT)
Dept: CASE MANAGEMENT | Age: 83
End: 2023-10-17

## 2023-10-17 NOTE — CARE COORDINATION
Care Transitions Initial Follow Up Call    Call within 2 business days of discharge: Yes    Patient Current Location:  Home: [address]  [address] [facility] [phone]    Care Transition Nurse contacted the patient by telephone to perform post hospital discharge assessment. Verified name and  with patient as identifiers. Provided introduction to self, and explanation of the Care Transition Nurse role. Patient: Mazin Biswas Patient : 1940   MRN: [id number]  Reason for Admission: chest pain  Discharge Date: 10/14/23 RARS: No data recorded    Last Discharge Facility       Date Complaint Diagnosis Description Type Department Provider    10/12/23 Chest Pain Chest pain, unspecified type . .. ED to Hosp-Admission (Discharged) (ADMITTED) PAPO Myers MD; Rodney Pettit, ... Was this an external facility discharge? No Discharge Facility: [address] [place] [address] to be reviewed by the provider   Additional needs identified to be addressed with provider: No  none               Method of communication with provider: none. Contacted pt for initial care transition follow up. Aleks Sparrow states she is doing fine. No further chest pain/discomfort, pressure, tightness. Denies having any increased shortness of breath, dizziness/lightheadedness, nausea/vomiting. Reports she received Lovenox injection. Having discomfort at site which started today. Denies swelling, itching, any abnormal bleeding. Pt will continue to monitor site and will contact her provider if it does not improve. Pt is eating and drinking fluids w/o issues. She does admit to not drinking much fluids. Encouraged to increase water intake to avoid dehydration. Denies having any urinary or bowel issues currently. Reviewed medication list.  No new or changed medications. Pt continues to take her medications as prescribed.   She had questions about the medications that were administered in the hospital and who would cover them

## 2023-10-23 ENCOUNTER — CARE COORDINATION (OUTPATIENT)
Dept: CASE MANAGEMENT | Age: 83
End: 2023-10-23

## 2023-10-23 NOTE — CARE COORDINATION
Care Transitions Outreach Attempt    Attempted to reach patient for transitions of care follow up. Unable to reach patient. Patient: Samira Corbin Patient : 1940 MRN: [id number]    Last Discharge Facility       Date Complaint Diagnosis Description Type Department Provider    10/12/23 Chest Pain Chest pain, unspecified type . .. ED to Hosp-Admission (Discharged) (ADMITTED) PAPO Spivey MD; Melissa Bedoya, . .. Noted following upcoming appointments from discharge chart review:   [facility][1] follow up appointment(s):   Future Appointments   Date Time Provider [address]   2023 11:15 AM Didier Canela MD N [facility] [place]   2024  1:20 PM Caitie Lazaro MD [facility] Physic [facility]     Non-[facility]  follow up appointment(s): PCP appt on 10/30/23    Attempted to contact pt for care transition follow up. Unable to reach pt. Left message with contact information and request for call back.       Nirmala Green RN

## 2023-10-24 ENCOUNTER — CARE COORDINATION (OUTPATIENT)
Dept: CASE MANAGEMENT | Age: 83
End: 2023-10-24

## 2023-10-24 NOTE — CARE COORDINATION
Care Transitions Outreach Attempt    Call within 2 business days of discharge: Yes   Attempted to reach patient for transitions of care follow up. Unable to reach patient. Left HIPAA compliant message w/ CTN # to return call. Day #1 w/ no response. Patient: Phill Hernandez Patient : 1940 MRN: [id number]    Last Discharge Facility       Date Complaint Diagnosis Description Type Department Provider    10/12/23 Chest Pain Chest pain, unspecified type . .. ED to Hosp-Admission (Discharged) (ADMITTED) PAPO Medina MD; Olimpia Daley, . .. Was this an external facility discharge?  No Discharge Facility Name:     Noted following upcoming appointments from discharge chart review:   [facility] follow up appointment(s):   Future Appointments   Date Time Provider [address]   2023 11:15 AM Benjy De Paz MD N [facility] Heart [facility]   2024  1:20 PM Jordi Lee -[facility]       follow up appointment(s):

## 2023-11-06 ENCOUNTER — TELEPHONE (OUTPATIENT)
Dept: CARDIOLOGY CLINIC | Age: 83
End: 2023-11-06

## 2023-11-06 NOTE — TELEPHONE ENCOUNTER
Pre op Risk Assessment    Procedure dental work   Physician Esperanza Pardo   Date of surgery/procedure TBD    Last OV 5/15/2023  Last Stress 10/13/2023  Last Echo 11/24/2021  Last Cath 6/23/2017  Last Stent none in epic   Is patient on blood thinners Brilinta and ASA   Hold Meds/how many days ? ?     Fax: [phone]

## 2023-11-07 ENCOUNTER — HOSPITAL ENCOUNTER (OUTPATIENT)
Age: 83
Discharge: HOME | End: 2023-11-07
Payer: MEDICARE

## 2023-11-07 DIAGNOSIS — Z98.890 S/P CARDIAC CATH: ICD-10-CM

## 2023-11-07 DIAGNOSIS — I25.10 CORONARY ARTERY DISEASE INVOLVING NATIVE CORONARY ARTERY OF NATIVE HEART WITHOUT ANGINA PECTORIS: ICD-10-CM

## 2023-11-07 DIAGNOSIS — Z95.820 S/P ANGIOPLASTY WITH STENT: ICD-10-CM

## 2023-11-07 DIAGNOSIS — E78.00 PURE HYPERCHOLESTEROLEMIA: ICD-10-CM

## 2023-11-07 DIAGNOSIS — R00.1 SINUS BRADYCARDIA: ICD-10-CM

## 2023-11-07 DIAGNOSIS — I10 PRIMARY HYPERTENSION: ICD-10-CM

## 2023-11-07 DIAGNOSIS — R06.02 SOB (SHORTNESS OF BREATH) ON EXERTION: ICD-10-CM

## 2023-11-07 LAB
ALBUMIN SERPL BCG-MCNC: 4.1 G/DL (ref 3.5–5.1)
ALP SERPL-CCNC: 93 U/L (ref 38–126)
ALT SERPL W/O P-5'-P-CCNC: 20 U/L (ref 11–66)
ANION GAP SERPL CALC-SCNC: 13 MEQ/L (ref 8–16)
AST SERPL-CCNC: 20 U/L (ref 5–40)
BILIRUB CONJ SERPL-MCNC: < 0.2 MG/DL (ref 0–0.3)
BILIRUB SERPL-MCNC: 0.7 MG/DL (ref 0.3–1.2)
BUN SERPL-MCNC: 19 MG/DL (ref 7–22)
CALCIUM SERPL-MCNC: 10.4 MG/DL (ref 8.5–10.5)
CHLORIDE SERPL-SCNC: 105 MEQ/L (ref 98–111)
CHOLESTEROL, FASTING: 176 MG/DL (ref 100–199)
CO2 SERPL-SCNC: 21 MEQ/L (ref 23–33)
CREAT SERPL-MCNC: 0.6 MG/DL (ref 0.4–1.2)
GFR SERPL CREATININE-BSD FRML MDRD: > 60 ML/MIN/1.73M2
GLUCOSE SERPL-MCNC: 107 MG/DL (ref 70–108)
HDLC SERPL-MCNC: 56 MG/DL
LDLC SERPL CALC-MCNC: 87 MG/DL
MAGNESIUM SERPL-MCNC: 2.2 MG/DL (ref 1.6–2.4)
POTASSIUM SERPL-SCNC: 4.6 MEQ/L (ref 3.5–5.2)
PROT SERPL-MCNC: 7.2 G/DL (ref 6.1–8)
SODIUM SERPL-SCNC: 139 MEQ/L (ref 135–145)
TRIGLYCERIDE, FASTING: 164 MG/DL (ref 0–199)

## 2023-11-07 PROCEDURE — 83735 ASSAY OF MAGNESIUM: CPT

## 2023-11-07 PROCEDURE — 80053 COMPREHEN METABOLIC PANEL: CPT

## 2023-11-07 PROCEDURE — 36415 COLL VENOUS BLD VENIPUNCTURE: CPT

## 2023-11-07 PROCEDURE — 80061 LIPID PANEL: CPT

## 2023-11-07 PROCEDURE — 82248 BILIRUBIN DIRECT: CPT

## 2023-11-20 ENCOUNTER — OFFICE VISIT (OUTPATIENT)
Dept: CARDIOLOGY CLINIC | Age: 83
End: 2023-11-20
Payer: MEDICARE

## 2023-11-20 VITALS
SYSTOLIC BLOOD PRESSURE: 168 MMHG | BODY MASS INDEX: 30.86 KG/M2 | DIASTOLIC BLOOD PRESSURE: 75 MMHG | HEART RATE: 60 BPM | WEIGHT: 157.2 LBS | HEIGHT: 60 IN

## 2023-11-20 DIAGNOSIS — E78.00 PURE HYPERCHOLESTEROLEMIA: ICD-10-CM

## 2023-11-20 DIAGNOSIS — R06.02 SOB (SHORTNESS OF BREATH) ON EXERTION: ICD-10-CM

## 2023-11-20 DIAGNOSIS — I10 PRIMARY HYPERTENSION: ICD-10-CM

## 2023-11-20 DIAGNOSIS — Z95.820 S/P ANGIOPLASTY WITH STENT: ICD-10-CM

## 2023-11-20 DIAGNOSIS — Z98.890 S/P CARDIAC CATH: ICD-10-CM

## 2023-11-20 DIAGNOSIS — I25.10 CORONARY ARTERY DISEASE INVOLVING NATIVE CORONARY ARTERY OF NATIVE HEART WITHOUT ANGINA PECTORIS: Primary | ICD-10-CM

## 2023-11-20 PROBLEM — R07.9 CHEST PAIN: Status: RESOLVED | Noted: 2023-10-12 | Resolved: 2023-11-20

## 2023-11-20 PROCEDURE — G8427 DOCREV CUR MEDS BY ELIG CLIN: HCPCS | Performed by: INTERNAL MEDICINE

## 2023-11-20 PROCEDURE — 1123F ACP DISCUSS/DSCN MKR DOCD: CPT | Performed by: INTERNAL MEDICINE

## 2023-11-20 PROCEDURE — G8399 PT W/DXA RESULTS DOCUMENT: HCPCS | Performed by: INTERNAL MEDICINE

## 2023-11-20 PROCEDURE — 1036F TOBACCO NON-USER: CPT | Performed by: INTERNAL MEDICINE

## 2023-11-20 PROCEDURE — G8484 FLU IMMUNIZE NO ADMIN: HCPCS | Performed by: INTERNAL MEDICINE

## 2023-11-20 PROCEDURE — 3077F SYST BP >= 140 MM HG: CPT | Performed by: INTERNAL MEDICINE

## 2023-11-20 PROCEDURE — 99214 OFFICE O/P EST MOD 30 MIN: CPT | Performed by: INTERNAL MEDICINE

## 2023-11-20 PROCEDURE — 1090F PRES/ABSN URINE INCON ASSESS: CPT | Performed by: INTERNAL MEDICINE

## 2023-11-20 PROCEDURE — 3078F DIAST BP <80 MM HG: CPT | Performed by: INTERNAL MEDICINE

## 2023-11-20 PROCEDURE — G8417 CALC BMI ABV UP PARAM F/U: HCPCS | Performed by: INTERNAL MEDICINE

## 2023-11-20 RX ORDER — ATORVASTATIN CALCIUM 20 MG/1
20 TABLET, FILM COATED ORAL DAILY
Qty: 90 TABLET | Refills: 3 | Status: SHIPPED | OUTPATIENT
Start: 2023-11-20

## 2023-11-20 RX ORDER — LISINOPRIL 10 MG/1
TABLET ORAL
Qty: 90 TABLET | Refills: 3 | Status: SHIPPED | OUTPATIENT
Start: 2023-11-20

## 2023-11-20 RX ORDER — DILTIAZEM HYDROCHLORIDE 120 MG/1
120 CAPSULE, COATED, EXTENDED RELEASE ORAL DAILY
Qty: 90 CAPSULE | Refills: 0 | Status: SHIPPED | OUTPATIENT
Start: 2023-11-20

## 2023-11-24 ENCOUNTER — HOSPITAL ENCOUNTER (OUTPATIENT)
Dept: MRI IMAGING | Age: 83
Discharge: HOME | End: 2023-11-24
Attending: ORTHOPAEDIC SURGERY
Payer: MEDICARE

## 2023-11-24 DIAGNOSIS — M25.561 RIGHT KNEE PAIN, UNSPECIFIED CHRONICITY: ICD-10-CM

## 2023-11-24 DIAGNOSIS — M79.662 PAIN OF LEFT CALF: ICD-10-CM

## 2023-11-24 PROCEDURE — 6360000004 HC RX CONTRAST MEDICATION: Performed by: ORTHOPAEDIC SURGERY

## 2023-11-24 PROCEDURE — 73720 MRI LWR EXTREMITY W/O&W/DYE: CPT

## 2023-11-24 PROCEDURE — A9579 GAD-BASE MR CONTRAST NOS,1ML: HCPCS | Performed by: ORTHOPAEDIC SURGERY

## 2023-11-24 RX ADMIN — GADOTERIDOL 15 ML: 279.3 INJECTION, SOLUTION INTRAVENOUS at 13:03

## 2024-03-21 RX ORDER — DILTIAZEM HYDROCHLORIDE 120 MG/1
120 CAPSULE, COATED, EXTENDED RELEASE ORAL DAILY
Qty: 90 CAPSULE | Refills: 0 | Status: SHIPPED | OUTPATIENT
Start: 2024-03-21

## 2024-04-19 ENCOUNTER — HOSPITAL ENCOUNTER (OUTPATIENT)
Age: 84
Discharge: HOME | End: 2024-04-19
Payer: MEDICARE

## 2024-04-19 DIAGNOSIS — E04.2 MULTINODULAR GOITER: ICD-10-CM

## 2024-04-19 LAB — TSH SERPL DL<=0.005 MIU/L-ACNC: 0.67 UIU/ML (ref 0.4–4.2)

## 2024-04-19 PROCEDURE — 84443 ASSAY THYROID STIM HORMONE: CPT

## 2024-04-19 PROCEDURE — 36415 COLL VENOUS BLD VENIPUNCTURE: CPT

## 2024-05-13 ENCOUNTER — OFFICE VISIT (OUTPATIENT)
Dept: CARDIOLOGY CLINIC | Age: 84
End: 2024-05-13
Payer: MEDICARE

## 2024-05-13 VITALS
BODY MASS INDEX: 30.23 KG/M2 | HEIGHT: 60 IN | WEIGHT: 154 LBS | SYSTOLIC BLOOD PRESSURE: 148 MMHG | DIASTOLIC BLOOD PRESSURE: 70 MMHG | HEART RATE: 65 BPM

## 2024-05-13 DIAGNOSIS — R00.1 SINUS BRADYCARDIA: ICD-10-CM

## 2024-05-13 DIAGNOSIS — E78.00 PURE HYPERCHOLESTEROLEMIA: ICD-10-CM

## 2024-05-13 DIAGNOSIS — Z98.890 S/P CARDIAC CATH: ICD-10-CM

## 2024-05-13 DIAGNOSIS — I25.10 CORONARY ARTERY DISEASE INVOLVING NATIVE CORONARY ARTERY OF NATIVE HEART WITHOUT ANGINA PECTORIS: Primary | ICD-10-CM

## 2024-05-13 DIAGNOSIS — I10 PRIMARY HYPERTENSION: ICD-10-CM

## 2024-05-13 DIAGNOSIS — Z95.820 S/P ANGIOPLASTY WITH STENT: ICD-10-CM

## 2024-05-13 PROBLEM — R00.0 SINUS TACHYCARDIA: Status: RESOLVED | Noted: 2017-04-11 | Resolved: 2024-05-13

## 2024-05-13 PROCEDURE — G8427 DOCREV CUR MEDS BY ELIG CLIN: HCPCS | Performed by: INTERNAL MEDICINE

## 2024-05-13 PROCEDURE — G8399 PT W/DXA RESULTS DOCUMENT: HCPCS | Performed by: INTERNAL MEDICINE

## 2024-05-13 PROCEDURE — 1123F ACP DISCUSS/DSCN MKR DOCD: CPT | Performed by: INTERNAL MEDICINE

## 2024-05-13 PROCEDURE — 3077F SYST BP >= 140 MM HG: CPT | Performed by: INTERNAL MEDICINE

## 2024-05-13 PROCEDURE — G8417 CALC BMI ABV UP PARAM F/U: HCPCS | Performed by: INTERNAL MEDICINE

## 2024-05-13 PROCEDURE — 3078F DIAST BP <80 MM HG: CPT | Performed by: INTERNAL MEDICINE

## 2024-05-13 PROCEDURE — 1036F TOBACCO NON-USER: CPT | Performed by: INTERNAL MEDICINE

## 2024-05-13 PROCEDURE — 99214 OFFICE O/P EST MOD 30 MIN: CPT | Performed by: INTERNAL MEDICINE

## 2024-05-13 PROCEDURE — 1090F PRES/ABSN URINE INCON ASSESS: CPT | Performed by: INTERNAL MEDICINE

## 2024-05-13 RX ORDER — DILTIAZEM HYDROCHLORIDE 120 MG/1
120 CAPSULE, COATED, EXTENDED RELEASE ORAL DAILY
Qty: 90 CAPSULE | Refills: 3 | Status: SHIPPED | OUTPATIENT
Start: 2024-05-13

## 2024-05-13 RX ORDER — LISINOPRIL 10 MG/1
TABLET ORAL
Qty: 90 TABLET | Refills: 3 | Status: SHIPPED | OUTPATIENT
Start: 2024-05-13

## 2024-05-13 RX ORDER — NITROGLYCERIN 0.4 MG/1
TABLET SUBLINGUAL
Qty: 25 TABLET | Refills: 0 | Status: SHIPPED | OUTPATIENT
Start: 2024-05-13

## 2024-05-13 RX ORDER — ATORVASTATIN CALCIUM 20 MG/1
20 TABLET, FILM COATED ORAL DAILY
Qty: 90 TABLET | Refills: 3 | Status: SHIPPED | OUTPATIENT
Start: 2024-05-13

## 2024-05-13 NOTE — PROGRESS NOTES
mouth once daily 90 capsule 0    BRILINTA 60 MG TABS tablet TAKE 1 TABLET BY MOUTH TWICE  DAILY 180 tablet 1    atorvastatin (LIPITOR) 20 MG tablet TAKE 1 TABLET BY MOUTH ONCE  DAILY 90 tablet 1    metoprolol tartrate (LOPRESSOR) 25 MG tablet Take 0.5 tablets by mouth 2 times daily 90 tablet 1    lisinopril (PRINIVIL;ZESTRIL) 10 MG tablet Take 1 tablet by mouth once daily 90 tablet 3    nitroGLYCERIN (NITROSTAT) 0.4 MG SL tablet up to max of 3 total doses. If no relief after 3rd dose, call 911. 25 tablet 3    aspirin 81 MG tablet Take 1 tablet by mouth daily      Multiple Vitamins-Minerals (MULTIVITAMIN PO) Take 1 tablet by mouth daily.      Calcium Carbonate-Vitamin D (CALCIUM 500 + D PO) Take 1 tablet by mouth daily.      oxybutynin (DITROPAN) 5 MG tablet Take 1 tablet by mouth as needed       No current facility-administered medications for this visit.       Allergies   Allergen Reactions    Codeine Anxiety        Family History   Problem Relation Age of Onset    Arthritis Mother     Mental Illness Mother     Arthritis Father     High Blood Pressure Father     High Cholesterol Father     Stroke Father     Mental Illness Father         Social History     Socioeconomic History    Marital status:      Spouse name: Not on file    Number of children: Not on file    Years of education: Not on file    Highest education level: Not on file   Occupational History    Not on file   Tobacco Use    Smoking status: Never    Smokeless tobacco: Never   Vaping Use    Vaping Use: Never used   Substance and Sexual Activity    Alcohol use: No     Alcohol/week: 0.0 standard drinks of alcohol     Comment: 2 drinks per year     Drug use: No    Sexual activity: Never   Other Topics Concern    Not on file   Social History Narrative    Not on file     Social Determinants of Health     Financial Resource Strain: Low Risk  (5/22/2023)    Overall Financial Resource Strain (CARDIA)     Difficulty of Paying Living Expenses: Not hard at

## 2024-05-20 ENCOUNTER — OFFICE VISIT (OUTPATIENT)
Dept: INTERNAL MEDICINE CLINIC | Age: 84
End: 2024-05-20
Payer: MEDICARE

## 2024-05-20 VITALS
HEART RATE: 72 BPM | SYSTOLIC BLOOD PRESSURE: 152 MMHG | BODY MASS INDEX: 30.59 KG/M2 | WEIGHT: 155.8 LBS | HEIGHT: 60 IN | OXYGEN SATURATION: 98 % | DIASTOLIC BLOOD PRESSURE: 84 MMHG

## 2024-05-20 DIAGNOSIS — E04.2 MULTINODULAR GOITER: ICD-10-CM

## 2024-05-20 DIAGNOSIS — I10 PRIMARY HYPERTENSION: ICD-10-CM

## 2024-05-20 DIAGNOSIS — I25.10 CORONARY ARTERY DISEASE INVOLVING NATIVE CORONARY ARTERY OF NATIVE HEART WITHOUT ANGINA PECTORIS: Primary | ICD-10-CM

## 2024-05-20 DIAGNOSIS — R00.0 SINUS TACHYCARDIA: ICD-10-CM

## 2024-05-20 PROCEDURE — 1123F ACP DISCUSS/DSCN MKR DOCD: CPT

## 2024-05-20 PROCEDURE — 1090F PRES/ABSN URINE INCON ASSESS: CPT

## 2024-05-20 PROCEDURE — 3079F DIAST BP 80-89 MM HG: CPT

## 2024-05-20 PROCEDURE — 1036F TOBACCO NON-USER: CPT

## 2024-05-20 PROCEDURE — 3077F SYST BP >= 140 MM HG: CPT

## 2024-05-20 PROCEDURE — G8427 DOCREV CUR MEDS BY ELIG CLIN: HCPCS

## 2024-05-20 PROCEDURE — 99213 OFFICE O/P EST LOW 20 MIN: CPT

## 2024-05-20 PROCEDURE — G8399 PT W/DXA RESULTS DOCUMENT: HCPCS

## 2024-05-20 PROCEDURE — G8417 CALC BMI ABV UP PARAM F/U: HCPCS

## 2024-05-20 ASSESSMENT — PATIENT HEALTH QUESTIONNAIRE - PHQ9
SUM OF ALL RESPONSES TO PHQ9 QUESTIONS 1 & 2: 0
2. FEELING DOWN, DEPRESSED OR HOPELESS: NOT AT ALL
SUM OF ALL RESPONSES TO PHQ QUESTIONS 1-9: 0
SUM OF ALL RESPONSES TO PHQ QUESTIONS 1-9: 0
1. LITTLE INTEREST OR PLEASURE IN DOING THINGS: NOT AT ALL
SUM OF ALL RESPONSES TO PHQ QUESTIONS 1-9: 0
SUM OF ALL RESPONSES TO PHQ QUESTIONS 1-9: 0

## 2024-05-20 NOTE — ASSESSMENT & PLAN NOTE
On Lopressor 12.5 mg twice daily and lisinopril 10 mg daily.  Blood pressure 160/80 on presentation to visit today, repeat 152/84.  Patient instructed to take blood pressures daily using home cuff, at different times of day, and call report measurements back to nursing staff in 1 week.  Patient instructed to schedule appointment if blood pressures consistently over 140s/80s for medication management and blood pressure cuff calibration.

## 2024-05-20 NOTE — PROGRESS NOTES
tobacco: Never   Vaping Use    Vaping Use: Never used   Substance and Sexual Activity    Alcohol use: No     Alcohol/week: 0.0 standard drinks of alcohol     Comment: 2 drinks per year     Drug use: No    Sexual activity: Never   Other Topics Concern    Not on file   Social History Narrative    Not on file     Social Determinants of Health     Financial Resource Strain: Low Risk  (5/22/2023)    Overall Financial Resource Strain (CARDIA)     Difficulty of Paying Living Expenses: Not hard at all   Food Insecurity: Not on file (5/22/2023)   Transportation Needs: Unknown (5/22/2023)    PRAPARE - Transportation     Lack of Transportation (Medical): Not on file     Lack of Transportation (Non-Medical): No   Physical Activity: Not on file   Stress: Not on file   Social Connections: Not on file   Intimate Partner Violence: Not on file   Housing Stability: Unknown (5/22/2023)    Housing Stability Vital Sign     Unable to Pay for Housing in the Last Year: Not on file     Number of Places Lived in the Last Year: Not on file     Unstable Housing in the Last Year: No       Family History   Problem Relation Age of Onset    Arthritis Mother     Mental Illness Mother     Arthritis Father     High Blood Pressure Father     High Cholesterol Father     Stroke Father     Mental Illness Father        Review of Systems:  Negative except for what is written in HPI.    Blood pressure (!) 152/84, pulse 72, height 1.524 m (5'), weight 70.7 kg (155 lb 12.8 oz), SpO2 98 %, not currently breastfeeding.    Physical Examination:  General: Pleasant, elderly, overweight,  female, alert, cooperative, no acute distress, on room air.  Eyes:  PERRLA. Nonicteric, no conjunctivitis, EOMs intact.   HENT: Normocephalic, atraumatic. Nares normal. Oral mucosa moist.  Hearing grossly intact.   Neck: Supple, with full range of motion. No gross JVD appreciated.  Mild thyromegaly noted.  Respiratory:  Normal effort. Clear to auscultation, without rales

## 2024-05-20 NOTE — ASSESSMENT & PLAN NOTE
Most recent thyroid ultrasound completed 5/15/2023 showed marked thyromegaly, multinodular goiter, with no significant interval change in the size of the nodules. TSH 0.666 on 4/19/2024.  Repeat US thyroid in 2025.

## 2024-05-20 NOTE — ASSESSMENT & PLAN NOTE
S/p MADELIN to mid LAD 2017.  Follows with Dr. Montejo outpatient.  On aspirin and Brilinta outpatient.  Will be following again with Dr. Montejo in November.  Advised to discuss with Dr. Montejo the possibility of discontinuing aspirin or Brilinta as patient is significantly more than 1 year out from last stent.

## 2024-05-20 NOTE — PATIENT INSTRUCTIONS
Record your blood pressure once daily at different times of day for a week and call this office with the measurements. If they're high at home, make an appointment to come in with your home blood pressure cuff for calibration.

## 2024-05-20 NOTE — ASSESSMENT & PLAN NOTE
On Cardizem 120 mg per cardiology.  Does have relative bradycardia at this time, frequently in the 50s at home.  Continue diltiazem at current dose.

## 2024-05-25 ENCOUNTER — APPOINTMENT (OUTPATIENT)
Dept: GENERAL RADIOLOGY | Age: 84
End: 2024-05-25
Payer: MEDICARE

## 2024-05-25 ENCOUNTER — HOSPITAL ENCOUNTER (EMERGENCY)
Age: 84
Discharge: HOME | End: 2024-05-25
Payer: MEDICARE

## 2024-05-25 VITALS
HEART RATE: 80 BPM | BODY MASS INDEX: 30.43 KG/M2 | WEIGHT: 155.8 LBS | OXYGEN SATURATION: 96 % | DIASTOLIC BLOOD PRESSURE: 89 MMHG | RESPIRATION RATE: 20 BRPM | SYSTOLIC BLOOD PRESSURE: 169 MMHG | TEMPERATURE: 98.9 F

## 2024-05-25 DIAGNOSIS — M54.50 ACUTE LEFT-SIDED LOW BACK PAIN WITHOUT SCIATICA: Primary | ICD-10-CM

## 2024-05-25 PROCEDURE — 6360000002 HC RX W HCPCS

## 2024-05-25 PROCEDURE — 99213 OFFICE O/P EST LOW 20 MIN: CPT

## 2024-05-25 PROCEDURE — 72100 X-RAY EXAM L-S SPINE 2/3 VWS: CPT

## 2024-05-25 PROCEDURE — 96372 THER/PROPH/DIAG INJ SC/IM: CPT

## 2024-05-25 RX ORDER — METHYLPREDNISOLONE ACETATE 80 MG/ML
60 INJECTION, SUSPENSION INTRA-ARTICULAR; INTRALESIONAL; INTRAMUSCULAR; SOFT TISSUE ONCE
Status: COMPLETED | OUTPATIENT
Start: 2024-05-25 | End: 2024-05-25

## 2024-05-25 RX ADMIN — METHYLPREDNISOLONE ACETATE 60 MG: 80 INJECTION, SUSPENSION INTRA-ARTICULAR; INTRALESIONAL; INTRAMUSCULAR; SOFT TISSUE at 17:00

## 2024-05-25 ASSESSMENT — PAIN DESCRIPTION - ORIENTATION: ORIENTATION: LEFT;LOWER

## 2024-05-25 ASSESSMENT — ENCOUNTER SYMPTOMS
BACK PAIN: 1
RESPIRATORY NEGATIVE: 1

## 2024-05-25 ASSESSMENT — PAIN SCALES - GENERAL: PAINLEVEL_OUTOF10: 5

## 2024-05-25 ASSESSMENT — PAIN DESCRIPTION - FREQUENCY: FREQUENCY: CONTINUOUS

## 2024-05-25 ASSESSMENT — PAIN - FUNCTIONAL ASSESSMENT: PAIN_FUNCTIONAL_ASSESSMENT: 0-10

## 2024-05-25 ASSESSMENT — PAIN DESCRIPTION - LOCATION: LOCATION: HIP;BACK

## 2024-05-25 ASSESSMENT — PAIN DESCRIPTION - PAIN TYPE: TYPE: ACUTE PAIN

## 2024-05-25 ASSESSMENT — PAIN DESCRIPTION - DESCRIPTORS: DESCRIPTORS: ACHING

## 2024-05-25 NOTE — ED PROVIDER NOTES
[facility] URGENT CARE  Urgent Care Encounter       CHIEF COMPLAINT       Chief Complaint   Patient presents with    Hip Pain     Left          Lower Back Pain     Left         Nurses Notes reviewed and I agree except as noted in the HPI.  HISTORY OF PRESENT ILLNESS   Angélica Biswas is a 83 y.o. female who presents left lower back pain.  States pain started after she was doing some gardening today.  Patient states she fell on Tuesday down to her knees and onto her belly denies any pain at that time.  Patient is on Brilinta.    The history is provided by the patient. No  was used.       REVIEW OF SYSTEMS     Review of Systems   Constitutional: Negative.    HENT: Negative.     Respiratory: Negative.     Cardiovascular: Negative.    Genitourinary: Negative.    Musculoskeletal:  Positive for back pain (left lower).   Skin: Negative.    Neurological: Negative.    Psychiatric/Behavioral: Negative.         PAST MEDICAL HISTORY         Diagnosis Date    Coronary artery disease involving native coronary artery of native heart without angina pectoris 5/5/2017    Gall stones     gall bladder removed in the 1970s    GERD (gastroesophageal reflux disease)     Hyperlipidemia     Hypertension     Osteoporosis        SURGICALHISTORY     Patient  has a past surgical history that includes Tonsillectomy (01/01/1958); Cholecystectomy (01/01/1990); Eye surgery (01/01/2010); Knee arthroscopy (2010, 2011); other surgical history (05/01/2012); knee surgery (Left, 04/01/2015); and Cardiac catheterization (2017).    CURRENT MEDICATIONS       Previous Medications    ASPIRIN 81 MG TABLET    Take 1 tablet by mouth daily    ATORVASTATIN (LIPITOR) 20 MG TABLET    Take 1 tablet by mouth daily    CALCIUM CARBONATE-VITAMIN D (CALCIUM 500 + D PO)    Take 1 tablet by mouth daily.    DILTIAZEM (CARDIZEM CD) 120 MG EXTENDED RELEASE CAPSULE    Take 1 capsule by mouth daily    LISINOPRIL (PRINIVIL;ZESTRIL) 10 MG TABLET  Introduction Text (Please End With A Colon): The following procedure was deferred: Detail Level: Detailed Procedure To Be Performed At Next Visit: Biopsy by shave method

## 2024-05-25 NOTE — DISCHARGE INSTRUCTIONS
Ice or heat to area as needed.  Follow-up with family doctor in 3 to 5 days for new or worsening symptoms..  Tylenol as needed for pain.  Increase fluid intake.  Go to ER if any numbness or tingling down the legs or sudden loss of bowel or bladder control.

## 2024-05-25 NOTE — ED TRIAGE NOTES
Patient ambulated to room with c/o left lower back and hip pain beginning two days ago, post fall. States while in the bathroom of her home, the pocket of her pants was caught on the handle of the vanity, causing the patient to fall forward, landing on her knees and stomach.

## 2024-05-29 ENCOUNTER — TELEPHONE (OUTPATIENT)
Dept: INTERNAL MEDICINE CLINIC | Age: 84
End: 2024-05-29

## 2024-05-29 NOTE — TELEPHONE ENCOUNTER
Pt called in her BP readings for the last 7 days    5/22 136/65  5/23 125/65  5/24 145/65  5/25 131/63  5/26 127/65  5/28 156/82  5/29 124/58    Any changes needed?

## 2024-07-02 ENCOUNTER — APPOINTMENT (OUTPATIENT)
Dept: ULTRASOUND IMAGING | Age: 84
DRG: 916 | End: 2024-07-02
Payer: MEDICARE

## 2024-07-02 ENCOUNTER — APPOINTMENT (OUTPATIENT)
Dept: CT IMAGING | Age: 84
DRG: 916 | End: 2024-07-02
Payer: MEDICARE

## 2024-07-02 ENCOUNTER — HOSPITAL ENCOUNTER (INPATIENT)
Age: 84
LOS: 1 days | Discharge: ANOTHER HOSPITAL | DRG: 916 | End: 2024-07-04
Payer: MEDICARE

## 2024-07-02 ENCOUNTER — APPOINTMENT (OUTPATIENT)
Dept: MRI IMAGING | Age: 84
DRG: 916 | End: 2024-07-02
Payer: MEDICARE

## 2024-07-02 DIAGNOSIS — R60.0 EDEMA OF RIGHT UPPER EXTREMITY: ICD-10-CM

## 2024-07-02 DIAGNOSIS — K59.00 CONSTIPATION, UNSPECIFIED CONSTIPATION TYPE: ICD-10-CM

## 2024-07-02 DIAGNOSIS — R79.89 ELEVATED LFTS: ICD-10-CM

## 2024-07-02 DIAGNOSIS — R10.13 ABDOMINAL PAIN, EPIGASTRIC: Primary | ICD-10-CM

## 2024-07-02 PROBLEM — R10.9 INTRACTABLE ABDOMINAL PAIN: Status: ACTIVE | Noted: 2024-07-02

## 2024-07-02 LAB
ALBUMIN SERPL BCG-MCNC: 4.5 G/DL (ref 3.5–5.1)
ALP SERPL-CCNC: 105 U/L (ref 38–126)
ALT SERPL W/O P-5'-P-CCNC: 301 U/L (ref 11–66)
ANION GAP SERPL CALC-SCNC: 14 MEQ/L (ref 8–16)
APAP SERPL-MCNC: < 5 UG/ML (ref 0–20)
AST SERPL-CCNC: 573 U/L (ref 5–40)
BASOPHILS ABSOLUTE: 0.1 THOU/MM3 (ref 0–0.1)
BASOPHILS NFR BLD AUTO: 0.4 %
BILIRUB CONJ SERPL-MCNC: 0.4 MG/DL (ref 0.1–13.8)
BILIRUB SERPL-MCNC: 0.8 MG/DL (ref 0.3–1.2)
BUN SERPL-MCNC: 20 MG/DL (ref 7–22)
CALCIUM SERPL-MCNC: 10.2 MG/DL (ref 8.5–10.5)
CHLORIDE SERPL-SCNC: 104 MEQ/L (ref 98–111)
CO2 SERPL-SCNC: 24 MEQ/L (ref 23–33)
CREAT SERPL-MCNC: 0.5 MG/DL (ref 0.4–1.2)
CRP SERPL-MCNC: < 0.3 MG/DL (ref 0–1)
DEPRECATED RDW RBC AUTO: 46.4 FL (ref 35–45)
EOSINOPHIL NFR BLD AUTO: 0.1 %
EOSINOPHILS ABSOLUTE: 0 THOU/MM3 (ref 0–0.4)
ERYTHROCYTE [DISTWIDTH] IN BLOOD BY AUTOMATED COUNT: 14.1 % (ref 11.5–14.5)
ERYTHROCYTE [SEDIMENTATION RATE] IN BLOOD BY WESTERGREN METHOD: 2 MM/HR (ref 0–20)
GFR SERPL CREATININE-BSD FRML MDRD: > 90 ML/MIN/1.73M2
GLUCOSE SERPL-MCNC: 162 MG/DL (ref 70–108)
HAV IGM SER QL: NEGATIVE
HBV CORE IGM SERPL QL IA: NEGATIVE
HBV SURFACE AG SERPL QL IA: NEGATIVE
HCT VFR BLD AUTO: 39 % (ref 37–47)
HCV IGG SERPL QL IA: NEGATIVE
HGB BLD-MCNC: 12.6 GM/DL (ref 12–16)
IMM GRANULOCYTES # BLD AUTO: 0.12 THOU/MM3 (ref 0–0.07)
IMM GRANULOCYTES NFR BLD AUTO: 0.9 %
LACTATE SERPL-SCNC: 3.1 MMOL/L (ref 0.5–2)
LIPASE SERPL-CCNC: 50.1 U/L (ref 5.6–51.3)
LYMPHOCYTES ABSOLUTE: 1 THOU/MM3 (ref 1–4.8)
LYMPHOCYTES NFR BLD AUTO: 7.1 %
MCH RBC QN AUTO: 29.1 PG (ref 26–33)
MCHC RBC AUTO-ENTMCNC: 32.3 GM/DL (ref 32.2–35.5)
MCV RBC AUTO: 90.1 FL (ref 81–99)
MONOCYTES ABSOLUTE: 1 THOU/MM3 (ref 0.4–1.3)
MONOCYTES NFR BLD AUTO: 6.8 %
NEUTROPHILS ABSOLUTE: 11.9 THOU/MM3 (ref 1.8–7.7)
NEUTROPHILS NFR BLD AUTO: 84.7 %
NRBC BLD AUTO-RTO: 0 /100 WBC
OSMOLALITY SERPL CALC.SUM OF ELEC: 289.3 MOSMOL/KG (ref 275–300)
PLATELET # BLD AUTO: 296 THOU/MM3 (ref 130–400)
PMV BLD AUTO: 9.1 FL (ref 9.4–12.4)
POTASSIUM SERPL-SCNC: 3.9 MEQ/L (ref 3.5–5.2)
PROCALCITONIN SERPL IA-MCNC: 0.12 NG/ML (ref 0.01–0.09)
PROT SERPL-MCNC: 6.6 G/DL (ref 6.1–8)
RBC # BLD AUTO: 4.33 MILL/MM3 (ref 4.2–5.4)
SODIUM SERPL-SCNC: 142 MEQ/L (ref 135–145)
WBC # BLD AUTO: 14 THOU/MM3 (ref 4.8–10.8)

## 2024-07-02 PROCEDURE — 85025 COMPLETE CBC W/AUTO DIFF WBC: CPT

## 2024-07-02 PROCEDURE — 93005 ELECTROCARDIOGRAM TRACING: CPT

## 2024-07-02 PROCEDURE — 76705 ECHO EXAM OF ABDOMEN: CPT

## 2024-07-02 PROCEDURE — G0378 HOSPITAL OBSERVATION PER HR: HCPCS

## 2024-07-02 PROCEDURE — 74183 MRI ABD W/O CNTR FLWD CNTR: CPT

## 2024-07-02 PROCEDURE — 80143 DRUG ASSAY ACETAMINOPHEN: CPT

## 2024-07-02 PROCEDURE — 36415 COLL VENOUS BLD VENIPUNCTURE: CPT

## 2024-07-02 PROCEDURE — 2580000003 HC RX 258: Performed by: PHYSICIAN ASSISTANT

## 2024-07-02 PROCEDURE — 82248 BILIRUBIN DIRECT: CPT

## 2024-07-02 PROCEDURE — 96376 TX/PRO/DX INJ SAME DRUG ADON: CPT

## 2024-07-02 PROCEDURE — 6360000004 HC RX CONTRAST MEDICATION: Performed by: PHYSICIAN ASSISTANT

## 2024-07-02 PROCEDURE — 6360000002 HC RX W HCPCS: Performed by: PHYSICIAN ASSISTANT

## 2024-07-02 PROCEDURE — 83605 ASSAY OF LACTIC ACID: CPT

## 2024-07-02 PROCEDURE — 99285 EMERGENCY DEPT VISIT HI MDM: CPT

## 2024-07-02 PROCEDURE — 96375 TX/PRO/DX INJ NEW DRUG ADDON: CPT

## 2024-07-02 PROCEDURE — 80074 ACUTE HEPATITIS PANEL: CPT

## 2024-07-02 PROCEDURE — 96366 THER/PROPH/DIAG IV INF ADDON: CPT

## 2024-07-02 PROCEDURE — 84145 PROCALCITONIN (PCT): CPT

## 2024-07-02 PROCEDURE — 99223 1ST HOSP IP/OBS HIGH 75: CPT

## 2024-07-02 PROCEDURE — 6370000000 HC RX 637 (ALT 250 FOR IP)

## 2024-07-02 PROCEDURE — 74177 CT ABD & PELVIS W/CONTRAST: CPT

## 2024-07-02 PROCEDURE — 96361 HYDRATE IV INFUSION ADD-ON: CPT

## 2024-07-02 PROCEDURE — 2500000003 HC RX 250 WO HCPCS: Performed by: PHYSICIAN ASSISTANT

## 2024-07-02 PROCEDURE — 80053 COMPREHEN METABOLIC PANEL: CPT

## 2024-07-02 PROCEDURE — 85651 RBC SED RATE NONAUTOMATED: CPT

## 2024-07-02 PROCEDURE — 96365 THER/PROPH/DIAG IV INF INIT: CPT

## 2024-07-02 PROCEDURE — 96374 THER/PROPH/DIAG INJ IV PUSH: CPT

## 2024-07-02 PROCEDURE — 2580000003 HC RX 258

## 2024-07-02 PROCEDURE — A9579 GAD-BASE MR CONTRAST NOS,1ML: HCPCS | Performed by: INTERNAL MEDICINE

## 2024-07-02 PROCEDURE — 83690 ASSAY OF LIPASE: CPT

## 2024-07-02 PROCEDURE — 86140 C-REACTIVE PROTEIN: CPT

## 2024-07-02 PROCEDURE — 6360000004 HC RX CONTRAST MEDICATION: Performed by: INTERNAL MEDICINE

## 2024-07-02 PROCEDURE — 93005 ELECTROCARDIOGRAM TRACING: CPT | Performed by: PHYSICIAN ASSISTANT

## 2024-07-02 RX ORDER — ASPIRIN 81 MG/1
81 TABLET ORAL DAILY
Status: DISCONTINUED | OUTPATIENT
Start: 2024-07-02 | End: 2024-07-04 | Stop reason: HOSPADM

## 2024-07-02 RX ORDER — ATORVASTATIN CALCIUM 20 MG/1
20 TABLET, FILM COATED ORAL DAILY
Status: DISCONTINUED | OUTPATIENT
Start: 2024-07-02 | End: 2024-07-04 | Stop reason: HOSPADM

## 2024-07-02 RX ORDER — SODIUM CHLORIDE 0.9 % (FLUSH) 0.9 %
5-40 SYRINGE (ML) INJECTION EVERY 12 HOURS SCHEDULED
Status: DISCONTINUED | OUTPATIENT
Start: 2024-07-02 | End: 2024-07-04 | Stop reason: HOSPADM

## 2024-07-02 RX ORDER — ONDANSETRON 2 MG/ML
4 INJECTION INTRAMUSCULAR; INTRAVENOUS EVERY 6 HOURS PRN
Status: DISCONTINUED | OUTPATIENT
Start: 2024-07-02 | End: 2024-07-04 | Stop reason: HOSPADM

## 2024-07-02 RX ORDER — MORPHINE SULFATE 2 MG/ML
2 INJECTION, SOLUTION INTRAMUSCULAR; INTRAVENOUS
Status: DISCONTINUED | OUTPATIENT
Start: 2024-07-02 | End: 2024-07-04 | Stop reason: HOSPADM

## 2024-07-02 RX ORDER — ACETAMINOPHEN 325 MG/1
650 TABLET ORAL EVERY 6 HOURS PRN
Status: DISCONTINUED | OUTPATIENT
Start: 2024-07-02 | End: 2024-07-04 | Stop reason: HOSPADM

## 2024-07-02 RX ORDER — 0.9 % SODIUM CHLORIDE 0.9 %
1000 INTRAVENOUS SOLUTION INTRAVENOUS ONCE
Status: COMPLETED | OUTPATIENT
Start: 2024-07-02 | End: 2024-07-02

## 2024-07-02 RX ORDER — MAGNESIUM SULFATE IN WATER 40 MG/ML
2000 INJECTION, SOLUTION INTRAVENOUS PRN
Status: DISCONTINUED | OUTPATIENT
Start: 2024-07-02 | End: 2024-07-04 | Stop reason: HOSPADM

## 2024-07-02 RX ORDER — LISINOPRIL 10 MG/1
10 TABLET ORAL DAILY
Status: DISCONTINUED | OUTPATIENT
Start: 2024-07-02 | End: 2024-07-04 | Stop reason: HOSPADM

## 2024-07-02 RX ORDER — POTASSIUM CHLORIDE 20 MEQ/1
40 TABLET, EXTENDED RELEASE ORAL PRN
Status: DISCONTINUED | OUTPATIENT
Start: 2024-07-02 | End: 2024-07-04 | Stop reason: HOSPADM

## 2024-07-02 RX ORDER — MORPHINE SULFATE 4 MG/ML
4 INJECTION, SOLUTION INTRAMUSCULAR; INTRAVENOUS
Status: DISCONTINUED | OUTPATIENT
Start: 2024-07-02 | End: 2024-07-04 | Stop reason: HOSPADM

## 2024-07-02 RX ORDER — POTASSIUM CHLORIDE 7.45 MG/ML
10 INJECTION INTRAVENOUS PRN
Status: DISCONTINUED | OUTPATIENT
Start: 2024-07-02 | End: 2024-07-04 | Stop reason: HOSPADM

## 2024-07-02 RX ORDER — MORPHINE SULFATE 2 MG/ML
2 INJECTION, SOLUTION INTRAMUSCULAR; INTRAVENOUS ONCE
Status: COMPLETED | OUTPATIENT
Start: 2024-07-02 | End: 2024-07-02

## 2024-07-02 RX ORDER — DILTIAZEM HYDROCHLORIDE 120 MG/1
120 CAPSULE, COATED, EXTENDED RELEASE ORAL DAILY
Status: DISCONTINUED | OUTPATIENT
Start: 2024-07-02 | End: 2024-07-04 | Stop reason: HOSPADM

## 2024-07-02 RX ORDER — POLYETHYLENE GLYCOL 3350 17 G/17G
17 POWDER, FOR SOLUTION ORAL DAILY PRN
Status: DISCONTINUED | OUTPATIENT
Start: 2024-07-02 | End: 2024-07-04 | Stop reason: HOSPADM

## 2024-07-02 RX ORDER — ONDANSETRON 4 MG/1
4 TABLET, ORALLY DISINTEGRATING ORAL EVERY 8 HOURS PRN
Status: DISCONTINUED | OUTPATIENT
Start: 2024-07-02 | End: 2024-07-04 | Stop reason: HOSPADM

## 2024-07-02 RX ORDER — SODIUM CHLORIDE 0.9 % (FLUSH) 0.9 %
5-40 SYRINGE (ML) INJECTION PRN
Status: DISCONTINUED | OUTPATIENT
Start: 2024-07-02 | End: 2024-07-04 | Stop reason: HOSPADM

## 2024-07-02 RX ORDER — FENTANYL CITRATE 50 UG/ML
50 INJECTION, SOLUTION INTRAMUSCULAR; INTRAVENOUS ONCE
Status: COMPLETED | OUTPATIENT
Start: 2024-07-02 | End: 2024-07-02

## 2024-07-02 RX ORDER — SODIUM CHLORIDE 9 MG/ML
INJECTION, SOLUTION INTRAVENOUS PRN
Status: DISCONTINUED | OUTPATIENT
Start: 2024-07-02 | End: 2024-07-04 | Stop reason: HOSPADM

## 2024-07-02 RX ORDER — ONDANSETRON 2 MG/ML
4 INJECTION INTRAMUSCULAR; INTRAVENOUS ONCE
Status: COMPLETED | OUTPATIENT
Start: 2024-07-02 | End: 2024-07-02

## 2024-07-02 RX ORDER — ACETAMINOPHEN 650 MG/1
650 SUPPOSITORY RECTAL EVERY 6 HOURS PRN
Status: DISCONTINUED | OUTPATIENT
Start: 2024-07-02 | End: 2024-07-04 | Stop reason: HOSPADM

## 2024-07-02 RX ADMIN — SODIUM CHLORIDE, PRESERVATIVE FREE 10 ML: 5 INJECTION INTRAVENOUS at 08:51

## 2024-07-02 RX ADMIN — SODIUM CHLORIDE 1000 ML: 9 INJECTION, SOLUTION INTRAVENOUS at 02:36

## 2024-07-02 RX ADMIN — SODIUM CHLORIDE 8 MG/HR: 9 INJECTION, SOLUTION INTRAVENOUS at 09:20

## 2024-07-02 RX ADMIN — FAMOTIDINE 20 MG: 10 INJECTION, SOLUTION INTRAVENOUS at 02:34

## 2024-07-02 RX ADMIN — FENTANYL CITRATE 50 MCG: 50 INJECTION, SOLUTION INTRAMUSCULAR; INTRAVENOUS at 05:58

## 2024-07-02 RX ADMIN — MORPHINE SULFATE 2 MG: 2 INJECTION, SOLUTION INTRAMUSCULAR; INTRAVENOUS at 02:36

## 2024-07-02 RX ADMIN — DILTIAZEM HYDROCHLORIDE 120 MG: 120 CAPSULE, COATED, EXTENDED RELEASE ORAL at 07:55

## 2024-07-02 RX ADMIN — IOPAMIDOL 80 ML: 755 INJECTION, SOLUTION INTRAVENOUS at 03:01

## 2024-07-02 RX ADMIN — ALUMINUM HYDROXIDE, MAGNESIUM HYDROXIDE, AND SIMETHICONE: 1200; 120; 1200 SUSPENSION ORAL at 06:24

## 2024-07-02 RX ADMIN — GADOTERIDOL 15 ML: 279.3 INJECTION, SOLUTION INTRAVENOUS at 18:08

## 2024-07-02 RX ADMIN — SODIUM CHLORIDE 8 MG/HR: 9 INJECTION, SOLUTION INTRAVENOUS at 21:11

## 2024-07-02 RX ADMIN — ONDANSETRON 4 MG: 2 INJECTION INTRAMUSCULAR; INTRAVENOUS at 02:05

## 2024-07-02 RX ADMIN — MORPHINE SULFATE 4 MG: 4 INJECTION, SOLUTION INTRAMUSCULAR; INTRAVENOUS at 08:46

## 2024-07-02 RX ADMIN — FENTANYL CITRATE 50 MCG: 50 INJECTION, SOLUTION INTRAMUSCULAR; INTRAVENOUS at 04:20

## 2024-07-02 ASSESSMENT — PAIN DESCRIPTION - FREQUENCY: FREQUENCY: CONTINUOUS

## 2024-07-02 ASSESSMENT — PAIN DESCRIPTION - ONSET: ONSET: ON-GOING

## 2024-07-02 ASSESSMENT — ENCOUNTER SYMPTOMS
SHORTNESS OF BREATH: 0
BACK PAIN: 0
ABDOMINAL PAIN: 1
COUGH: 0
DIARRHEA: 0
VOMITING: 1
NAUSEA: 1

## 2024-07-02 ASSESSMENT — PAIN DESCRIPTION - LOCATION
LOCATION: ABDOMEN
LOCATION: ABDOMEN;OTHER (COMMENT)
LOCATION: ABDOMEN;CHEST
LOCATION: ABDOMEN

## 2024-07-02 ASSESSMENT — PAIN SCALES - GENERAL
PAINLEVEL_OUTOF10: 10
PAINLEVEL_OUTOF10: 2
PAINLEVEL_OUTOF10: 10
PAINLEVEL_OUTOF10: 4
PAINLEVEL_OUTOF10: 2
PAINLEVEL_OUTOF10: 10

## 2024-07-02 ASSESSMENT — PAIN DESCRIPTION - DESCRIPTORS
DESCRIPTORS: SHARP
DESCRIPTORS: SHARP
DESCRIPTORS: DISCOMFORT

## 2024-07-02 ASSESSMENT — PAIN DESCRIPTION - PAIN TYPE
TYPE: ACUTE PAIN
TYPE: ACUTE PAIN

## 2024-07-02 ASSESSMENT — PAIN - FUNCTIONAL ASSESSMENT
PAIN_FUNCTIONAL_ASSESSMENT: 0-10
PAIN_FUNCTIONAL_ASSESSMENT: ACTIVITIES ARE NOT PREVENTED

## 2024-07-02 ASSESSMENT — PAIN DESCRIPTION - ORIENTATION: ORIENTATION: MID

## 2024-07-02 NOTE — H&P
Hospitalist History & Physical         Patient: Angélica Biswas 83 y.o. female      : 1940  Date of Admission: 2024  Date of Service: Pt seen/examined on 24 and Admitted to Observation with expected LOS less than two midnights due to medical therapy.         ASSESSMENT AND PLAN    Intractable epigastric pain with nausea/vomiting: Pain is not proportionate with findings. No acute findings on imaging at this time.  Abdominal ultrasound to assess for CBD dilation pending  Lactic acid pending  Multimodal pain management    Transaminitis: , .  GI consult, appreciate recommendations  Hepatitis panel pending  Consider MRCP/ERCP pending clinical course  Holding antiplatelets for now in case of procedure. Resume if testing negative and no need for intervention  Holding Statin at this time, resume when appropriate  Abdominal ultrasound to assess for CBD dilation pending    Incidental findings of Large left renal calculi without hydronephrosis:   Consider urology consult pending clinical course. May follow up outpatient as unlikely source of pain.    Constipation: patient reports bowel movement on 2024 that was soft  Consider good bowel regimen pending clinical findings. May need to add further stool softener and or fiber upon discharge.    Leukocytosis: WBC 14. No overt signs of infection noted.  Urine pending  Inflammatory markers pending     Chronic Conditions (reviewed and stable unless otherwise stated)   GERD: resume PPI  Primary hypertension: resume home medications with hold parameters  CAD: holding antiplatelets at this time. If no intervention needed may resume.      Data reviewed (unless otherwise discussed in assessment/plan)  EKG:  I have reviewed the EKG with the following interpretation: Sinus bradycardia  Imaging: I have reviewed CT A/P with the following interpretation: Large left renal pelvic stone without evidence for obstruction at this time. Nonobstructing left  lesions.  Neurologic:  No focal sensory/motor deficits in the upper and lower extremities. Cranial nerves:  grossly non-focal 2-12.     Psychiatric: Alert and oriented, normal insight and thought content.   Capillary Refill: Brisk,< 3 seconds.  Peripheral Pulses: +2 palpable, equal bilaterally.       Medications Prior to Admission:   Prior to Admission Medications   Prescriptions Last Dose Informant Patient Reported? Taking?   Calcium Carbonate-Vitamin D (CALCIUM 500 + D PO)   Yes No   Sig: Take 1 tablet by mouth daily.   Multiple Vitamins-Minerals (MULTIVITAMIN PO)   Yes No   Sig: Take 1 tablet by mouth daily.   aspirin 81 MG tablet   Yes No   Sig: Take 1 tablet by mouth daily   atorvastatin (LIPITOR) 20 MG tablet   No No   Sig: Take 1 tablet by mouth daily   dilTIAZem (CARDIZEM CD) 120 MG extended release capsule   No No   Sig: Take 1 capsule by mouth daily   lisinopril (PRINIVIL;ZESTRIL) 10 MG tablet   No No   Sig: Take 1 tablet by mouth once daily   metoprolol tartrate (LOPRESSOR) 25 MG tablet   No No   Sig: Take 0.5 tablets by mouth 2 times daily   nitroGLYCERIN (NITROSTAT) 0.4 MG SL tablet   No No   Sig: up to max of 3 total doses. If no relief after 3rd dose, call 911.   oxybutynin (DITROPAN) 5 MG tablet   Yes No   Sig: Take 1 tablet by mouth as needed   ticagrelor (BRILINTA) 60 MG TABS tablet   No No   Sig: Take 1 tablet by mouth 2 times daily      Facility-Administered Medications: None     Allergies:  Codeine    Past Medical, Family, Social, Surgical Hx      Diagnosis Date    Coronary artery disease involving native coronary artery of native heart without angina pectoris 5/5/2017    Gall stones     gall bladder removed in the 1970s    GERD (gastroesophageal reflux disease)     Hyperlipidemia     Hypertension     Osteoporosis          Procedure Laterality Date    CARDIAC CATHETERIZATION  2017    with stent    CHOLECYSTECTOMY  01/01/1990    EYE SURGERY  01/01/2010    KNEE ARTHROSCOPY  2010, 2011    KNEE

## 2024-07-02 NOTE — PROGRESS NOTES
07/02/24 1719   Encounter Summary   Encounter Overview/Reason Attempted Encounter   Service Provided For Patient   Referral/Consult From [facility]   Support System Children   Last Encounter  07/02/24   Complexity of Encounter Low   Begin Time 1714   End Time  1719   Total Time Calculated 5 min   Assessment/Intervention/Outcome   Assessment Unable to assess   Intervention Prayer (assurance of)/[place]     During my encounter with the 83 yr old patient, I attempted to visit with the pt on 6K. The patient appears to be resting (not responsive/resting) now and I didn't want to disturb the patient. I or another  will attempt to visit the patient or the family at another time.

## 2024-07-02 NOTE — ED PROVIDER NOTES
[facility] EMERGENCY DEPT      Pt Name: Angélica Biswas  MRN: [id number]  Birthdate 1940  Date of evaluation: 7/2/2024  Provider: Kristina Swanson PA-C    CHIEF COMPLAINT       Chief Complaint   Patient presents with    Abdominal Pain       Nurses Notes reviewed and I agree except as noted in the HPI.      HISTORY OF PRESENT ILLNESS    Angélica Biswas is a 83 y.o. female who presents from home driven by daughter and son-in-law for abdominal pain.  Patient developed epigastric abdominal pain along with nausea, vomiting, and sweats this evening.  Patient tried Tylenol but threw it up.  Patient has not experienced this pain previously.  Patient denies diarrhea, constipation, urinary symptoms, chest pain, shortness of breath, or other complaints.  Patient reports she has been passing gas.  It is difficult to get details from the patient when she is moaning on the cart.  Daughter and son-in-law present at bedside report patient has a history of cholecystectomy and coronary stent.  Patient has been taking 2 Tylenol daily for sciatica but does not take it more than once a day.  Patient does not drink alcohol.    Location/Symptom: Epigastric abdominal pain  Timing/Onset: 2-3 hours prior to arrival  Context/Setting: Spontaneous onset accompanied with nausea and vomiting.  Patient denies bad food exposure  Quality: Unable to describe  Duration: Constant  Modifying Factors: None  Severity: 10/10    REVIEW OF SYSTEMS     Review of Systems   Constitutional:  Positive for diaphoresis. Negative for chills and fever.   Respiratory:  Negative for cough and shortness of breath.    Cardiovascular:  Negative for chest pain.   Gastrointestinal:  Positive for abdominal pain, nausea and vomiting. Negative for diarrhea.   Genitourinary:  Negative for difficulty urinating, dysuria, flank pain and frequency.   Musculoskeletal:  Negative for back pain and gait problem.   Skin:  Negative for rash.   Neurological:  Negative for     reports that she has never smoked. She has never used smokeless tobacco. She reports that she does not drink alcohol and does not use drugs.    PHYSICAL EXAM     INITIAL VITALS:  weight is 67.6 kg (149 lb). Her rectal temperature is 97.9 °F (36.6 °C). Her blood pressure is 142/65 (abnormal) and her pulse is 82. Her respiration is 17 and oxygen saturation is 99%.    Physical Exam  Vitals and nursing note reviewed.   Constitutional:       General: She is in acute distress.      Appearance: Normal appearance. She is well-developed. She is obese. She is not toxic-appearing or diaphoretic.      Comments: Patient moaning on cart   HENT:      Head: Normocephalic and atraumatic.      Right Ear: Hearing normal.      Left Ear: Hearing normal.      Nose: Nose normal. No rhinorrhea.      Mouth/Throat:      Lips: Pink.      Mouth: Mucous membranes are moist.      Pharynx: Uvula midline.   Eyes:      General: Lids are normal. No scleral icterus.     Extraocular Movements: Extraocular movements intact.      Conjunctiva/sclera: Conjunctivae normal.      Pupils: Pupils are equal, round, and reactive to light.   Neck:      Vascular: No JVD.      Trachea: Trachea normal. No tracheal deviation.   Cardiovascular:      Rate and Rhythm: Normal rate and regular rhythm.      Heart sounds: Normal heart sounds.   Pulmonary:      Effort: Pulmonary effort is normal. No respiratory distress.      Breath sounds: Normal breath sounds. No decreased breath sounds or wheezing.   Abdominal:      General: Bowel sounds are normal. There is no distension.      Palpations: Abdomen is soft. Abdomen is not rigid. There is no pulsatile mass.      Tenderness: There is abdominal tenderness in the epigastric area. There is no right CVA tenderness, left CVA tenderness, guarding or rebound.      Hernia: No hernia is present.   Musculoskeletal:         General: Normal range of motion.      Cervical back: No rigidity.      Comments: Movement normal as observed

## 2024-07-02 NOTE — CARE COORDINATION
Case Management Assessment Initial Evaluation    Date/Time of Evaluation: 2024 7:56 AM  Assessment Completed by: Mo Bradshaw RN    If patient is discharged prior to next notation, then this note serves as note for discharge by case management.    Patient Name: Angélica Biswas                   YOB: 1940  Diagnosis: Abdominal pain, epigastric [R10.13]  Elevated LFTs [R79.89]  Intractable abdominal pain [R10.9]  Constipation, unspecified constipation type [K59.00]                   Date / Time: 2024  1:23 AM  [facility]     Patient Admission Status: Observation   Readmission Risk Low 0-14, Mod 15-19), High > 20: No data recorded  Current PCP: Holli Montgomery APRN - CNP    Additional Case Management Notes: Lactic acid 3.1, WBC 14.0. Abd US pending. GI consult pending. NPO.     Procedures: none    Imagin/2 CT abd: Large left renal pelvic stone without evidence for obstruction at this time. Dense retained stool.     Patient Goals/Plan/Treatment Preferences: Met with Angélica, she is from home independently. She does not use DME or home services. At this time she denies all discharge needs.        24 1036   Service Assessment   Patient Orientation Alert and Oriented   Cognition Alert   History Provided By Patient   Primary Caregiver Self   Support Systems Children;Family Members   Patient's Healthcare Decision Maker is: Named in Scanned ACP Document   PCP Verified by CM Yes   Prior Functional Level Independent in ADLs/IADLs   Current Functional Level Independent in ADLs/IADLs   Can patient return to prior living arrangement Yes   Ability to make needs known: Good   Family able to assist with home care needs: Yes   Would you like for me to discuss the discharge plan with any other family members/significant others, and if so, who? Yes  (daughter present)   Financial Resources Medicare   Community Resources None   Discharge Planning   Type of Residence House   Living  Arrangements Alone   Current Services Prior To Admission None   Potential Assistance Needed N/A   DME Ordered? No   Potential Assistance Purchasing Medications No   Type of Home Care Services None   Patient expects to be discharged to: House   Condition of Participation: Discharge Planning   The Plan for Transition of Care is related to the following treatment goals: pain management

## 2024-07-02 NOTE — CONSULTS
[place] [id number]                              CONSULTATION      PATIENT NAME: ISMAEL ALVAREZ             : 1940  MED REC NO: [id number]                       ROOM: [facility]  ACCOUNT NO: [id number]                       ADMIT DATE: 2024  PROVIDER: Eric Price MD      CONSULT DATE: 2024    REASON FOR CONSULTATION:  Elevated liver enzymes, epigastric pain.    HISTORY OF PRESENT ILLNESS:  The patient is an 83-year-old pleasant white female, who is known to our GI service.  Patient had seen Dr. Villasenor and Hazel Swanson in the past.  Patient had known liver cyst over the years, which had been stable.  Last time was seen in .  The patient was admitted with this abdominal pain.  The patient states that she sometimes has a little abdominal pressure, but nothing like attack of pain.  She had epigastric pain that was radiating on the right side.  She felt very sick and profuse vomiting with that.  Initially, vomiting made the pain worse, but then it did give a little improvement.  Patient's pain did respond to morphine and it got somewhat easier.  The patient had gotten the last injection at 10:30 in the morning, and this is afternoon and the patient does not have any more pain at the moment, and she is not having any nausea, vomiting.  Patient also had elevated liver enzymes,  and .  Patient has the history of gallbladder surgery, which is around 50 years back.  Two family members present during meeting, but patient states that she does not remember it so long back if she had the same kind of pain, but she feels there might be nausea, vomiting at that time, but it has been so long back that she has forgotten that pain, and she feels comfortable at the moment.  Patient has no previous history of peptic ulcer disease and the patient did not have any fever, chills, and she does have regular bowel movements,  sometimes on alternate days, and she feels that she is not having much different bowel habits at the moment.  CT is showing some constipation.  The patient does have some GERD symptoms and she takes medicine for it.  The patient has history of hypertension, GERD, and coronary artery disease, and the patient has labs done which show the electrolytes are normal, so is kidney function 20 BUN and creatinine 0.5.  Blood glucose is 162.  ALT and AST are as mentioned above, 301 and 573.  Alkaline phosphatase is 105 with normal bilirubin.  White cell was initially 14,000; hemoglobin 13; hematocrit 39; platelet count 296.  The hepatitis screen was negative.  Ultrasound was done, but result is pending and the patient also had CT of the abdomen that has some nonsignificant finding.  It does show 6.1 cm cyst which is calcified and its location is matching with the old cyst.  Cholecystectomy changes are seen.  Small hiatal hernia is appreciated.  Patient also having some adrenal gland nodules and some left renal pelvic stone and diverticulosis without diverticulitis and patient has some retained stool, and the hepatic cyst is labeled as stable.    PAST MEDICAL HISTORY:  Positive for GERD, hyperlipidemia, hypertension, osteoporosis, and coronary artery disease.    PAST SURGICAL HISTORY:  Positive for cardiac cath, cholecystectomy which was around in 1970s, eye surgery, knee surgery, tonsillectomy.    FAMILY HISTORY:  Positive for hypertension, hypercholesterolemia, and no GI cancers.    PERSONAL HISTORY:  Positive for she is a , and never smoked and intake of some social drinking.    REVIEW OF SYSTEMS:  Negative for all pertinent in 12 systems except for the one mentioned in the History of Present Illness.    PHYSICAL EXAMINATION:  GENERAL:  She is alert, not in any discomfort.  VITAL SIGNS:  Blood pressure is around 140/70, pulse rate is 82, temperature 98, and respiratory rate is 17.  Weight is 149 pounds.  HEENT:  Pupils

## 2024-07-02 NOTE — ED NOTES
ED to inpatient nurses report      Chief Complaint:  Chief Complaint   Patient presents with    Abdominal Pain     Present to ED from: home    MOA:     LOC: alert and orientated to name, place, date  Mobility: Requires assistance * 2  Oxygen Baseline: roomair    Current needs required: none     Code Status:   Prior      Mental Status:  Level of Consciousness: Alert (0)    Psych Assessment:        Vitals:  Patient Vitals for the past 24 hrs:   BP Temp Temp src Pulse Resp SpO2 Weight   07/02/24 0541 (!) 153/70 -- -- 61 14 100 % --   07/02/24 0325 (!) 142/65 -- -- 82 17 99 % --   07/02/24 0317 -- 97.9 °F (36.6 °C) Rectal -- -- -- --   07/02/24 0237 (!) 171/66 -- -- 52 20 99 % --   07/02/24 0134 -- 97.9 °F (36.6 °C) Oral -- -- -- --   07/02/24 0132 (!) 176/63 -- -- -- -- -- --   07/02/24 0131 -- -- -- 58 16 99 % 67.6 kg (149 lb)        LDAs:   Peripheral IV 07/02/24 Right Antecubital (Active)       Ambulatory Status:  No data recorded    Diagnosis:  DISPOSITION Admitted 07/02/2024 05:25:09 AM   Final diagnoses:   Abdominal pain, epigastric   Constipation, unspecified constipation type   Elevated LFTs        Consults:  IP CONSULT TO GI     Pain Score:  Pain Assessment  Pain Assessment: 0-10  Pain Level: 10  Pain Location: Abdomen, Chest    C-SSRS:        Sepsis Screening:       Becky Fall Risk:       Swallow Screening        Preferred Language:   English      ALLERGIES     Codeine    SURGICAL HISTORY       Past Surgical History:   Procedure Laterality Date    CARDIAC CATHETERIZATION  2017    with stent    CHOLECYSTECTOMY  01/01/1990    EYE SURGERY  01/01/2010    KNEE ARTHROSCOPY  2010, 2011    KNEE SURGERY Left 04/01/2015    OTHER SURGICAL HISTORY  05/01/2012    Patient had skin removed from eye lid    TONSILLECTOMY  01/01/1958       PAST MEDICAL HISTORY       Past Medical History:   Diagnosis Date    Coronary artery disease involving native coronary artery of native heart without angina pectoris 5/5/2017    Gall stones

## 2024-07-02 NOTE — PLAN OF CARE
Patient admitted this morning for intractable epigastric pain with nausea and vomiting.  Has a history of cholecystectomy.  Review of chart shows significant transaminitis.  Hepatitis panel negative.    Start PPI drip as possibility of PUD as cause of symptoms.  Waiting for GI input.  Would recommend MRCP will but defer to GI after their evaluation    Ultrasound abdomen is pending    Patient is hemodynamically stable

## 2024-07-03 ENCOUNTER — APPOINTMENT (OUTPATIENT)
Dept: GENERAL RADIOLOGY | Age: 84
DRG: 916 | End: 2024-07-03
Payer: MEDICARE

## 2024-07-03 ENCOUNTER — ANESTHESIA EVENT (OUTPATIENT)
Dept: ENDOSCOPY | Age: 84
End: 2024-07-03
Payer: MEDICARE

## 2024-07-03 ENCOUNTER — ANESTHESIA (OUTPATIENT)
Dept: ENDOSCOPY | Age: 84
End: 2024-07-03
Payer: MEDICARE

## 2024-07-03 LAB
ALBUMIN SERPL BCG-MCNC: 3.7 G/DL (ref 3.5–5.1)
ALP SERPL-CCNC: 169 U/L (ref 38–126)
ALT SERPL W/O P-5'-P-CCNC: 888 U/L (ref 11–66)
ANION GAP SERPL CALC-SCNC: 7 MEQ/L (ref 8–16)
AST SERPL-CCNC: 629 U/L (ref 5–40)
BASOPHILS ABSOLUTE: 0 THOU/MM3 (ref 0–0.1)
BASOPHILS NFR BLD AUTO: 0.7 %
BILIRUB CONJ SERPL-MCNC: 0.3 MG/DL (ref 0.1–13.8)
BILIRUB SERPL-MCNC: 0.8 MG/DL (ref 0.3–1.2)
BUN SERPL-MCNC: 19 MG/DL (ref 7–22)
CALCIUM SERPL-MCNC: 8.9 MG/DL (ref 8.5–10.5)
CHLORIDE SERPL-SCNC: 109 MEQ/L (ref 98–111)
CO2 SERPL-SCNC: 25 MEQ/L (ref 23–33)
CREAT SERPL-MCNC: 0.5 MG/DL (ref 0.4–1.2)
DEPRECATED RDW RBC AUTO: 50.4 FL (ref 35–45)
EKG ATRIAL RATE: 53 BPM
EKG ATRIAL RATE: 62 BPM
EKG P AXIS: 51 DEGREES
EKG P AXIS: 64 DEGREES
EKG P-R INTERVAL: 142 MS
EKG P-R INTERVAL: 152 MS
EKG Q-T INTERVAL: 424 MS
EKG Q-T INTERVAL: 432 MS
EKG QRS DURATION: 88 MS
EKG QRS DURATION: 90 MS
EKG QTC CALCULATION (BAZETT): 397 MS
EKG QTC CALCULATION (BAZETT): 438 MS
EKG R AXIS: 23 DEGREES
EKG R AXIS: 39 DEGREES
EKG T AXIS: 31 DEGREES
EKG T AXIS: 40 DEGREES
EKG VENTRICULAR RATE: 53 BPM
EKG VENTRICULAR RATE: 62 BPM
EOSINOPHIL NFR BLD AUTO: 1.8 %
EOSINOPHILS ABSOLUTE: 0.1 THOU/MM3 (ref 0–0.4)
ERYTHROCYTE [DISTWIDTH] IN BLOOD BY AUTOMATED COUNT: 15.1 % (ref 11.5–14.5)
GFR SERPL CREATININE-BSD FRML MDRD: > 90 ML/MIN/1.73M2
GLUCOSE SERPL-MCNC: 99 MG/DL (ref 70–108)
HCT VFR BLD AUTO: 37.4 % (ref 37–47)
HGB BLD-MCNC: 12 GM/DL (ref 12–16)
IMM GRANULOCYTES # BLD AUTO: 0.04 THOU/MM3 (ref 0–0.07)
IMM GRANULOCYTES NFR BLD AUTO: 0.6 %
LYMPHOCYTES ABSOLUTE: 1.5 THOU/MM3 (ref 1–4.8)
LYMPHOCYTES NFR BLD AUTO: 21.7 %
MAGNESIUM SERPL-MCNC: 2.2 MG/DL (ref 1.6–2.4)
MCH RBC QN AUTO: 29.5 PG (ref 26–33)
MCHC RBC AUTO-ENTMCNC: 32.1 GM/DL (ref 32.2–35.5)
MCV RBC AUTO: 91.9 FL (ref 81–99)
MONOCYTES ABSOLUTE: 0.6 THOU/MM3 (ref 0.4–1.3)
MONOCYTES NFR BLD AUTO: 7.9 %
NEUTROPHILS ABSOLUTE: 4.8 THOU/MM3 (ref 1.8–7.7)
NEUTROPHILS NFR BLD AUTO: 67.3 %
NRBC BLD AUTO-RTO: 0 /100 WBC
PLATELET # BLD AUTO: 236 THOU/MM3 (ref 130–400)
PMV BLD AUTO: 9.1 FL (ref 9.4–12.4)
POTASSIUM SERPL-SCNC: 3.8 MEQ/L (ref 3.5–5.2)
PROT SERPL-MCNC: 5.6 G/DL (ref 6.1–8)
RBC # BLD AUTO: 4.07 MILL/MM3 (ref 4.2–5.4)
SODIUM SERPL-SCNC: 141 MEQ/L (ref 135–145)
WBC # BLD AUTO: 7.1 THOU/MM3 (ref 4.8–10.8)

## 2024-07-03 PROCEDURE — 7100000000 HC PACU RECOVERY - FIRST 15 MIN: Performed by: INTERNAL MEDICINE

## 2024-07-03 PROCEDURE — 96366 THER/PROPH/DIAG IV INF ADDON: CPT

## 2024-07-03 PROCEDURE — 36415 COLL VENOUS BLD VENIPUNCTURE: CPT

## 2024-07-03 PROCEDURE — 99232 SBSQ HOSP IP/OBS MODERATE 35: CPT | Performed by: NURSE PRACTITIONER

## 2024-07-03 PROCEDURE — 82248 BILIRUBIN DIRECT: CPT

## 2024-07-03 PROCEDURE — 2580000003 HC RX 258: Performed by: PHYSICIAN ASSISTANT

## 2024-07-03 PROCEDURE — 2500000003 HC RX 250 WO HCPCS: Performed by: NURSE ANESTHETIST, CERTIFIED REGISTERED

## 2024-07-03 PROCEDURE — 80053 COMPREHEN METABOLIC PANEL: CPT

## 2024-07-03 PROCEDURE — 85025 COMPLETE CBC W/AUTO DIFF WBC: CPT

## 2024-07-03 PROCEDURE — 6360000002 HC RX W HCPCS

## 2024-07-03 PROCEDURE — G0378 HOSPITAL OBSERVATION PER HR: HCPCS

## 2024-07-03 PROCEDURE — 74328 X-RAY BILE DUCT ENDOSCOPY: CPT

## 2024-07-03 PROCEDURE — 6370000000 HC RX 637 (ALT 250 FOR IP)

## 2024-07-03 PROCEDURE — 6360000002 HC RX W HCPCS: Performed by: INTERNAL MEDICINE

## 2024-07-03 PROCEDURE — 6360000002 HC RX W HCPCS: Performed by: NURSE PRACTITIONER

## 2024-07-03 PROCEDURE — 6360000002 HC RX W HCPCS: Performed by: NURSE ANESTHETIST, CERTIFIED REGISTERED

## 2024-07-03 PROCEDURE — 0FJB8ZZ INSPECTION OF HEPATOBILIARY DUCT, VIA NATURAL OR ARTIFICIAL OPENING ENDOSCOPIC: ICD-10-PCS | Performed by: INTERNAL MEDICINE

## 2024-07-03 PROCEDURE — 3609018800 HC ERCP DX COLLECTION SPECIMEN BRUSHING/WASHING: Performed by: INTERNAL MEDICINE

## 2024-07-03 PROCEDURE — 2580000003 HC RX 258

## 2024-07-03 PROCEDURE — 2580000003 HC RX 258: Performed by: NURSE PRACTITIONER

## 2024-07-03 PROCEDURE — 2580000003 HC RX 258: Performed by: INTERNAL MEDICINE

## 2024-07-03 PROCEDURE — 3700000000 HC ANESTHESIA ATTENDED CARE: Performed by: INTERNAL MEDICINE

## 2024-07-03 PROCEDURE — 6370000000 HC RX 637 (ALT 250 FOR IP): Performed by: INTERNAL MEDICINE

## 2024-07-03 PROCEDURE — 93010 ELECTROCARDIOGRAM REPORT: CPT | Performed by: INTERNAL MEDICINE

## 2024-07-03 PROCEDURE — 3700000001 HC ADD 15 MINUTES (ANESTHESIA): Performed by: INTERNAL MEDICINE

## 2024-07-03 PROCEDURE — 7100000001 HC PACU RECOVERY - ADDTL 15 MIN: Performed by: INTERNAL MEDICINE

## 2024-07-03 PROCEDURE — 6360000002 HC RX W HCPCS: Performed by: PHYSICIAN ASSISTANT

## 2024-07-03 PROCEDURE — 83735 ASSAY OF MAGNESIUM: CPT

## 2024-07-03 RX ORDER — FENTANYL CITRATE 50 UG/ML
INJECTION, SOLUTION INTRAMUSCULAR; INTRAVENOUS PRN
Status: DISCONTINUED | OUTPATIENT
Start: 2024-07-03 | End: 2024-07-03 | Stop reason: SDUPTHER

## 2024-07-03 RX ORDER — SODIUM CHLORIDE 9 MG/ML
INJECTION, SOLUTION INTRAVENOUS CONTINUOUS
Status: DISCONTINUED | OUTPATIENT
Start: 2024-07-03 | End: 2024-07-04 | Stop reason: HOSPADM

## 2024-07-03 RX ORDER — HYDRALAZINE HYDROCHLORIDE 20 MG/ML
10 INJECTION INTRAMUSCULAR; INTRAVENOUS
Status: DISCONTINUED | OUTPATIENT
Start: 2024-07-03 | End: 2024-07-03 | Stop reason: HOSPADM

## 2024-07-03 RX ORDER — DEXAMETHASONE SODIUM PHOSPHATE 4 MG/ML
INJECTION, SOLUTION INTRA-ARTICULAR; INTRALESIONAL; INTRAMUSCULAR; INTRAVENOUS; SOFT TISSUE PRN
Status: DISCONTINUED | OUTPATIENT
Start: 2024-07-03 | End: 2024-07-03 | Stop reason: SDUPTHER

## 2024-07-03 RX ORDER — SODIUM CHLORIDE 9 MG/ML
INJECTION, SOLUTION INTRAVENOUS
Status: COMPLETED
Start: 2024-07-03 | End: 2024-07-03

## 2024-07-03 RX ORDER — PROPOFOL 10 MG/ML
INJECTION, EMULSION INTRAVENOUS PRN
Status: DISCONTINUED | OUTPATIENT
Start: 2024-07-03 | End: 2024-07-03 | Stop reason: SDUPTHER

## 2024-07-03 RX ORDER — LIDOCAINE HYDROCHLORIDE 20 MG/ML
INJECTION, SOLUTION INTRAVENOUS PRN
Status: DISCONTINUED | OUTPATIENT
Start: 2024-07-03 | End: 2024-07-03 | Stop reason: SDUPTHER

## 2024-07-03 RX ORDER — SODIUM CHLORIDE, SODIUM LACTATE, POTASSIUM CHLORIDE, CALCIUM CHLORIDE 600; 310; 30; 20 MG/100ML; MG/100ML; MG/100ML; MG/100ML
INJECTION, SOLUTION INTRAVENOUS CONTINUOUS
Status: DISCONTINUED | OUTPATIENT
Start: 2024-07-03 | End: 2024-07-04 | Stop reason: HOSPADM

## 2024-07-03 RX ORDER — PIPERACILLIN SODIUM, TAZOBACTAM SODIUM 3; .375 G/15ML; G/15ML
INJECTION, POWDER, LYOPHILIZED, FOR SOLUTION INTRAVENOUS
Status: DISPENSED
Start: 2024-07-03 | End: 2024-07-04

## 2024-07-03 RX ORDER — INDOMETHACIN 100 MG
100 SUPPOSITORY, RECTAL RECTAL ONCE
Status: COMPLETED | OUTPATIENT
Start: 2024-07-03 | End: 2024-07-03

## 2024-07-03 RX ORDER — ROCURONIUM BROMIDE 10 MG/ML
INJECTION, SOLUTION INTRAVENOUS PRN
Status: DISCONTINUED | OUTPATIENT
Start: 2024-07-03 | End: 2024-07-03 | Stop reason: SDUPTHER

## 2024-07-03 RX ORDER — HYDRALAZINE HYDROCHLORIDE 20 MG/ML
INJECTION INTRAMUSCULAR; INTRAVENOUS
Status: COMPLETED
Start: 2024-07-03 | End: 2024-07-03

## 2024-07-03 RX ORDER — ONDANSETRON 2 MG/ML
INJECTION INTRAMUSCULAR; INTRAVENOUS PRN
Status: DISCONTINUED | OUTPATIENT
Start: 2024-07-03 | End: 2024-07-03 | Stop reason: SDUPTHER

## 2024-07-03 RX ORDER — SODIUM CHLORIDE 9 MG/ML
INJECTION, SOLUTION INTRAVENOUS
Status: DISPENSED
Start: 2024-07-03 | End: 2024-07-04

## 2024-07-03 RX ADMIN — SODIUM CHLORIDE, PRESERVATIVE FREE 10 ML: 5 INJECTION INTRAVENOUS at 20:48

## 2024-07-03 RX ADMIN — Medication 100 MG: at 17:04

## 2024-07-03 RX ADMIN — SODIUM CHLORIDE: 9 INJECTION, SOLUTION INTRAVENOUS at 14:20

## 2024-07-03 RX ADMIN — SODIUM CHLORIDE, PRESERVATIVE FREE 40 MG: 5 INJECTION INTRAVENOUS at 20:50

## 2024-07-03 RX ADMIN — PIPERACILLIN AND TAZOBACTAM 3375 MG: 3; .375 INJECTION, POWDER, FOR SOLUTION INTRAVENOUS at 17:15

## 2024-07-03 RX ADMIN — METOPROLOL TARTRATE 12.5 MG: 25 TABLET, FILM COATED ORAL at 08:01

## 2024-07-03 RX ADMIN — HYDRALAZINE HYDROCHLORIDE 10 MG: 20 INJECTION, SOLUTION INTRAMUSCULAR; INTRAVENOUS at 18:25

## 2024-07-03 RX ADMIN — PHENYLEPHRINE HYDROCHLORIDE 100 MCG: 10 INJECTION INTRAVENOUS at 17:50

## 2024-07-03 RX ADMIN — DEXAMETHASONE SODIUM PHOSPHATE 8 MG: 4 INJECTION, SOLUTION INTRAMUSCULAR; INTRAVENOUS at 17:05

## 2024-07-03 RX ADMIN — FENTANYL CITRATE 50 MCG: 50 INJECTION, SOLUTION INTRAMUSCULAR; INTRAVENOUS at 17:11

## 2024-07-03 RX ADMIN — ONDANSETRON 4 MG: 2 INJECTION INTRAMUSCULAR; INTRAVENOUS at 17:52

## 2024-07-03 RX ADMIN — HYDRALAZINE HYDROCHLORIDE 10 MG: 20 INJECTION INTRAMUSCULAR; INTRAVENOUS at 18:25

## 2024-07-03 RX ADMIN — SODIUM CHLORIDE, POTASSIUM CHLORIDE, SODIUM LACTATE AND CALCIUM CHLORIDE: 600; 310; 30; 20 INJECTION, SOLUTION INTRAVENOUS at 20:52

## 2024-07-03 RX ADMIN — SUGAMMADEX 200 MG: 100 INJECTION, SOLUTION INTRAVENOUS at 17:55

## 2024-07-03 RX ADMIN — LIDOCAINE HYDROCHLORIDE 100 MG: 20 INJECTION INTRAVENOUS at 16:54

## 2024-07-03 RX ADMIN — METOPROLOL TARTRATE 12.5 MG: 25 TABLET, FILM COATED ORAL at 20:53

## 2024-07-03 RX ADMIN — PROPOFOL 150 MG: 10 INJECTION, EMULSION INTRAVENOUS at 16:54

## 2024-07-03 RX ADMIN — DILTIAZEM HYDROCHLORIDE 120 MG: 120 CAPSULE, COATED, EXTENDED RELEASE ORAL at 08:01

## 2024-07-03 RX ADMIN — ROCURONIUM BROMIDE 40 MG: 10 INJECTION INTRAVENOUS at 16:54

## 2024-07-03 RX ADMIN — SODIUM CHLORIDE 8 MG/HR: 9 INJECTION, SOLUTION INTRAVENOUS at 06:19

## 2024-07-03 ASSESSMENT — PAIN DESCRIPTION - DESCRIPTORS: DESCRIPTORS: ACHING;SORE

## 2024-07-03 ASSESSMENT — PAIN - FUNCTIONAL ASSESSMENT
PAIN_FUNCTIONAL_ASSESSMENT: ACTIVITIES ARE NOT PREVENTED
PAIN_FUNCTIONAL_ASSESSMENT: ACTIVITIES ARE NOT PREVENTED
PAIN_FUNCTIONAL_ASSESSMENT: NONE - DENIES PAIN

## 2024-07-03 ASSESSMENT — PAIN SCALES - GENERAL
PAINLEVEL_OUTOF10: 2
PAINLEVEL_OUTOF10: 1
PAINLEVEL_OUTOF10: 0

## 2024-07-03 ASSESSMENT — PAIN DESCRIPTION - ONSET: ONSET: ON-GOING

## 2024-07-03 ASSESSMENT — PAIN DESCRIPTION - ORIENTATION: ORIENTATION: MID

## 2024-07-03 ASSESSMENT — PAIN DESCRIPTION - LOCATION
LOCATION: THROAT
LOCATION: HEAD

## 2024-07-03 ASSESSMENT — PAIN DESCRIPTION - PAIN TYPE: TYPE: ACUTE PAIN;SURGICAL PAIN

## 2024-07-03 ASSESSMENT — PAIN DESCRIPTION - FREQUENCY: FREQUENCY: CONTINUOUS

## 2024-07-03 NOTE — ANESTHESIA POSTPROCEDURE EVALUATION
Department of Anesthesiology  Postprocedure Note    Patient: Angélica Biswas  MRN: [id number]  YOB: 1940  Date of evaluation: 7/3/2024    Procedure Summary       Date: 07/03/24 Room / Location: [place] [address] / [facility]    Anesthesia Start: 1649 Anesthesia Stop: 1814    Procedure: ENDOSCOPIC RETROGRADE CHOLANGIOPANCREATOGRAPHY Diagnosis:       Abdominal pain, unspecified abdominal location      (Abdominal pain, unspecified abdominal location [R10.9])    Surgeons: Radha Clark MD Responsible Provider: Jacques Mike MD    Anesthesia Type: general ASA Status: 3            Anesthesia Type: No value filed.    Carline Phase I: Carline Score: 10    Carline Phase II:      Anesthesia Post Evaluation    Patient location during evaluation: PACU  Patient participation: complete - patient participated  Level of consciousness: awake  Airway patency: patent  Nausea & Vomiting: no vomiting and no nausea  Cardiovascular status: hemodynamically stable  Respiratory status: acceptable and nasal cannula  Hydration status: stable  Pain management: adequate    No notable events documented.

## 2024-07-03 NOTE — PROCEDURES
PROCEDURE NOTE  Date: 7/3/2024   Name: Angélica Biswas  YOB: 1940    Procedures    [facility]  ERCP  Endoscopic Retrograde Cholangiopancreatography Procedure Report  Patient: Angélica Biswas  : 1940  Acct#: [id number]  PHYSICIAN:  Radha Clark MD    PREPROCEDURE DIAGNOSIS:  This is a 83 y.o. , female , with history of symptomatic choledocholithiasis.  Has had a cholecystectomy several years ago. Pt. Is ON BRILINTA AND LAST DOSE WAS TWO DAYS AGO.     Medications:  see Anesthesia Records for details.     Zosyn IV preprocedure getting as an inpatient.   Indocin 100mg suppository given.      DESCRIPTION OF PROCEDURE: The  risk of bleeding, trauma, infection, perforation, pancreatitis, death and medication-related cardiac and respiratory complications were discussed and written informed consent was obtained.     The endoscope was passed with ease through the mouth under direct visualization; it was advanced to the 2nd portion of the duodenum.  The scope was withdrawn and the mucosa was examined.  See summary for detailed findings.    Findings:  Normal, limited endoscopic exam from the esophagus to the second portion of the duodenum.    Normal major papilla.  See summary for details of findings.        The stomach was decompressed.      Complications: The patient tolerated the procedure well.  There were no complications.    EBL : < 10 mL      SUMMARY:   Otherwise normal limited endoscopic exam from the esophagus to the second portion of the duodenum.  Normal major papilla  Unable to cannulate the common bile duct despite seeing bile. Suspect a biliary stricture.   Extravasation of contrast.         Stomach decompressed    RECOMMENDATIONS:    NPO today when awake, alert, and oriented.  If abdominal pain is 2 or less call on  the on call Dr [facility],  and I will place on clear liquids.           Resume previous medications.  Will have to get IR percutaneous biliary drain

## 2024-07-03 NOTE — PROGRESS NOTES
ERCP completed. Pt tolerated well.  Conray dye lot #[id number]. Expiration date 02/2026. Instilled 11 mL. Wasted 19 mL. Fluro pictures taken. Post procedure blood and abrasion noted to tongue and lip from bite block. Patient suctioned and does not appear to be in distress.     Scope  used.

## 2024-07-03 NOTE — PROGRESS NOTES
Hospitalist Progress Note    Patient:  Angélica Biswas      Unit/Bed:6K-06/006-A    YOB: 1940    MRN: [id number]       Acct: [id number]     PCP: Holli Montgomery APRN - CNP    Date of Admission: 7/2/2024    Assessment/Plan:    Intractable epigastric pain with nausea/vomiting, resolved. Stop IV Protonix drip and change to IVP BID. Antiemetics PRN.  Transaminitis: Up sanchez trend. MRCP suspicious for bilary duct stones, CBD dilated measuring 6-7 MM. GI consult, appreciate recommendations. NPO for Probable ERCP. Start IVF. Hepatitis panel negative.   Incidental findings of Large left renal calculi without hydronephrosis: outpatient f/u.    Constipation, resolved  Leukocytosis, resolved.   Chronic Conditions   GERD: see #1.   Primary hypertension: resume home medications with hold parameters  CAD: holding DAPT. Restart pending GI recs.   Obesity. BMI 33.33      Chief Complaint: abdominal pain     Hospital Course:     Angélica Biswas is a 83 y.o. female with PMHx of GERD, hypertension, CAD who presents to [facility] with abdominal pain.  Patient admits to waking up with severe epigastric pain rating the pain 10/10 and sharp in quality. Denies any radiation of the pain. Nothing makes the pain better or worse. Patient admits to having multiple episodes of vomiting and denies hematemesis.      Denies any history of PUD, chest pain, shortness of breath, fevers, chills, or rigors. Denies alcohol use. Denies any recent sick contacts or bad food.     ED course: labs drawn, imaging obtained    Subjective: No n/v/ abd pain since admission. Reports faint HA 1/10 that is generalized. Wants to bathe. No CP/SOB      Medications:  Reviewed    Infusion Medications    sodium chloride      sodium chloride       Scheduled Medications    pantoprazole (PROTONIX) 40 mg in sodium chloride (PF) 0.9 % 10 mL injection  40 mg IntraVENous Q12H    sodium chloride flush  5-40 mL IntraVENous 2 times per day       Nonobstructing left renal stone.   Dense retained stool.   Diverticulosis without diverticulitis.   Stable hepatic cyst.   Small hiatal hernia.      This document has been electronically signed by: Bigg Junior MD on    07/02/2024 04:05 AM      All CTs at this facility use dose modulation techniques and iterative    reconstructions, and/or weight-based dosing   when appropriate to reduce radiation to a low as reasonably achievable.          Diet: Diet NPO    DVT prophylaxis: [] Lovenox                                 [x] SCDs                                 [] SQ Heparin                                 [] Encourage ambulation           [] Already on Anticoagulation     Disposition:    [x] Home       [] TCU       [] Rehab       [] Psych       [] SNF       [] Long Term Care Facility       [] Other-    Code Status: Full Code    PT/OT Eval Status: NA        Electronically signed by EDER Monson CNP on 7/3/2024 at 1:35 PM

## 2024-07-03 NOTE — H&P
Naomie OROZCO Kirsten, PA-C, 4 mg at 07/02/24 0846  Prior to Admission medications    Medication Sig Start Date End Date Taking? Authorizing Provider   atorvastatin (LIPITOR) 20 MG tablet Take 1 tablet by mouth daily 5/13/24   Titi Montejo MD   ticagrelor (BRILINTA) 60 MG TABS tablet Take 1 tablet by mouth 2 times daily 5/13/24   Titi Montejo MD   dilTIAZem (CARDIZEM CD) 120 MG extended release capsule Take 1 capsule by mouth daily 5/13/24   Titi Montejo MD   lisinopril (PRINIVIL;ZESTRIL) 10 MG tablet Take 1 tablet by mouth once daily 5/13/24   Titi Montejo MD   metoprolol tartrate (LOPRESSOR) 25 MG tablet Take 0.5 tablets by mouth 2 times daily 5/13/24   Titi Montejo MD   nitroGLYCERIN (NITROSTAT) 0.4 MG SL tablet up to max of 3 total doses. If no relief after 3rd dose, call 911.  Patient not taking: Reported on 7/2/2024 5/13/24   Titi Montejo MD   aspirin 81 MG tablet Take 1 tablet by mouth daily    ProviderSandro MD   Multiple Vitamins-Minerals (MULTIVITAMIN PO) Take 1 tablet by mouth daily.    ProviderSandro MD   Calcium Carbonate-Vitamin D (CALCIUM 500 + D PO) Take 1 tablet by mouth daily.    Sandro Berrios MD   oxybutynin (DITROPAN) 5 MG tablet Take 1 tablet by mouth as needed    ProviderSandro MD     Additional information:       PHYSICAL:    height is 1.524 m (5') and weight is 77.4 kg (170 lb 10.2 oz). Her oral temperature is 98.3 °F (36.8 °C). Her blood pressure is 148/61 (abnormal) and her pulse is 79. Her respiration is 16 and oxygen saturation is 95%.   Heart:  [x]Regular rate and rhythm  []Other:    Lungs:  [x]Clear    []Other:    Abdomen: [x]Soft    []Other:    Mental Status: [x]Alert & Oriented  []Other:      VITAL SIGNS   Patient Vitals for the past 24 hrs:   BP Temp Temp src Pulse Resp SpO2 Weight   07/03/24 1144 (!) 148/61 98.3 °F (36.8 °C) Oral 79 16 95 % --   07/03/24 0757 139/66 98.6 °F (37 °C) Oral 80 18 96 % --   07/03/24 0401 -- -- -- -- --  -- 77.4 kg (170 lb 10.2 oz)   07/03/24 0356 (!) 137/59 98.6 °F (37 °C) Oral 75 -- 95 % --   07/02/24 2325 133/63 98.6 °F (37 °C) Oral 58 18 96 % --   07/02/24 2015 137/64 98.4 °F (36.9 °C) Oral 59 18 95 % --       PLANNED PROCEDURE   []EGD  []EGD/BRAVO []PEG   []Colonoscopy []Flex Sigmoid  [x]ERCP []EUS        Consent: I have discussed with the patient and/or the patient representative the indication, alternatives, and the possible risks and/or complications of the planned procedure and the anesthesia methods. The patient and/or patient representative appear to understand and agree to proceed.    SEDATION (see Anesthesia note)    ASA Classification: Class 3 - A patient with severe systemic disease that limits activity but is not incapacitating    Airway Assessment: normal    Monitoring and Safety: The patient will be placed on a cardiac monitor and vital signs, pulse oximetry and level of consciousness will be continuously evaluated throughout the procedure. The patient will be closely monitored until recovery from the medications is complete and the patient has returned to baseline status.  Respiratory therapy will be on standby during the procedure.    [x]Pre-procedure diagnostic studies complete and results available.   Comment:    [x]Previous sedation/anesthesia experiences assessed.   Comment:    [x]The patient is an appropriate candidate to undergo the planned procedure sedation and anesthesia. (Refer to nursing sedation/analgesia documentation record)  [x]Formulation and discussion of sedation/procedure plan, risks, and expectations with patient and/or responsible adult completed.  [x]Patient examined immediately prior to the procedure. (Refer to nursing sedation/analgesia documentation record)    Radha Clark MD   Electronically signed 7/3/2024 at 3:41 PM

## 2024-07-03 NOTE — PLAN OF CARE
Problem: Discharge Planning  Goal: Discharge to home or other facility with appropriate resources  7/3/2024 1022 by Vickie Santiago, RN  Outcome: Progressing  7/3/2024 0059 by Juvencio Kelley, RN  Outcome: Progressing

## 2024-07-03 NOTE — ANESTHESIA PRE PROCEDURE
Department of Anesthesiology  Preprocedure Note       Name:  Angélica Biswas   Age:  83 y.o.  :  1940                                          MRN:  [id number]         Date:  7/3/2024      Surgeon: Surgeon(s):  Radha Clark MD    Procedure: Procedure(s):  ENDOSCOPIC RETROGRADE CHOLANGIOPANCREATOGRAPHY    Medications prior to admission:   Prior to Admission medications    Medication Sig Start Date End Date Taking? Authorizing Provider   atorvastatin (LIPITOR) 20 MG tablet Take 1 tablet by mouth daily 24   Titi Montejo MD   ticagrelor (BRILINTA) 60 MG TABS tablet Take 1 tablet by mouth 2 times daily 24   Titi Montejo MD   dilTIAZem (CARDIZEM CD) 120 MG extended release capsule Take 1 capsule by mouth daily 24   Titi Montejo MD   lisinopril (PRINIVIL;ZESTRIL) 10 MG tablet Take 1 tablet by mouth once daily 24   Titi Montejo MD   metoprolol tartrate (LOPRESSOR) 25 MG tablet Take 0.5 tablets by mouth 2 times daily 24   Titi Montejo MD   nitroGLYCERIN (NITROSTAT) 0.4 MG SL tablet up to max of 3 total doses. If no relief after 3rd dose, call 911.  Patient not taking: Reported on 2024   Titi Montejo MD   aspirin 81 MG tablet Take 1 tablet by mouth daily    ProviderSandro MD   Multiple Vitamins-Minerals (MULTIVITAMIN PO) Take 1 tablet by mouth daily.    ProviderSandro MD   Calcium Carbonate-Vitamin D (CALCIUM 500 + D PO) Take 1 tablet by mouth daily.    ProviderSandro MD   oxybutynin (DITROPAN) 5 MG tablet Take 1 tablet by mouth as needed    ProviderSandro MD       Current medications:    Current Facility-Administered Medications   Medication Dose Route Frequency Provider Last Rate Last Admin    pantoprazole (PROTONIX) 40 mg in sodium chloride (PF) 0.9 % 10 mL injection  40 mg IntraVENous Q12H Jovany Rodriges APRN - CNP        0.9 % sodium chloride infusion   IntraVENous Continuous Jovany Rodriges APRN - CNP

## 2024-07-03 NOTE — PROGRESS NOTES
1810: pt arrives to [facility], respirations unlabored on 4L nasal cannula.   1825: medicated with 10mg hydralazine  1830: report called to RN on 6k  1845: pt meets criteria for discharge from [facility] at this time  1850: pt transported to 6k in stable condition.

## 2024-07-04 ENCOUNTER — APPOINTMENT (OUTPATIENT)
Dept: INTERVENTIONAL RADIOLOGY/VASCULAR | Age: 84
DRG: 916 | End: 2024-07-04
Payer: MEDICARE

## 2024-07-04 VITALS
DIASTOLIC BLOOD PRESSURE: 70 MMHG | BODY MASS INDEX: 33.5 KG/M2 | HEART RATE: 97 BPM | OXYGEN SATURATION: 96 % | HEIGHT: 60 IN | WEIGHT: 170.64 LBS | TEMPERATURE: 98 F | RESPIRATION RATE: 16 BRPM | SYSTOLIC BLOOD PRESSURE: 148 MMHG

## 2024-07-04 PROBLEM — K80.50 CHOLEDOCHOLITHIASIS: Status: ACTIVE | Noted: 2024-07-04

## 2024-07-04 LAB
ANION GAP SERPL CALC-SCNC: 11 MEQ/L (ref 8–16)
BASOPHILS ABSOLUTE: 0 THOU/MM3 (ref 0–0.1)
BASOPHILS NFR BLD AUTO: 0.1 %
BUN SERPL-MCNC: 25 MG/DL (ref 7–22)
CALCIUM SERPL-MCNC: 8.7 MG/DL (ref 8.5–10.5)
CHLORIDE SERPL-SCNC: 110 MEQ/L (ref 98–111)
CO2 SERPL-SCNC: 20 MEQ/L (ref 23–33)
CREAT SERPL-MCNC: 0.5 MG/DL (ref 0.4–1.2)
DEPRECATED RDW RBC AUTO: 50.4 FL (ref 35–45)
ECHO BSA: 1.81 M2
EOSINOPHIL NFR BLD AUTO: 0 %
EOSINOPHILS ABSOLUTE: 0 THOU/MM3 (ref 0–0.4)
ERYTHROCYTE [DISTWIDTH] IN BLOOD BY AUTOMATED COUNT: 14.8 % (ref 11.5–14.5)
GFR SERPL CREATININE-BSD FRML MDRD: > 90 ML/MIN/1.73M2
GLUCOSE SERPL-MCNC: 129 MG/DL (ref 70–108)
HCT VFR BLD AUTO: 37.1 % (ref 37–47)
HGB BLD-MCNC: 11.9 GM/DL (ref 12–16)
IMM GRANULOCYTES # BLD AUTO: 0.08 THOU/MM3 (ref 0–0.07)
IMM GRANULOCYTES NFR BLD AUTO: 0.9 %
LYMPHOCYTES ABSOLUTE: 0.6 THOU/MM3 (ref 1–4.8)
LYMPHOCYTES NFR BLD AUTO: 6.2 %
MAGNESIUM SERPL-MCNC: 2.2 MG/DL (ref 1.6–2.4)
MCH RBC QN AUTO: 29.8 PG (ref 26–33)
MCHC RBC AUTO-ENTMCNC: 32.1 GM/DL (ref 32.2–35.5)
MCV RBC AUTO: 92.8 FL (ref 81–99)
MONOCYTES ABSOLUTE: 0.2 THOU/MM3 (ref 0.4–1.3)
MONOCYTES NFR BLD AUTO: 2.1 %
NEUTROPHILS ABSOLUTE: 8.5 THOU/MM3 (ref 1.8–7.7)
NEUTROPHILS NFR BLD AUTO: 90.7 %
NRBC BLD AUTO-RTO: 0 /100 WBC
PLATELET # BLD AUTO: 305 THOU/MM3 (ref 130–400)
PMV BLD AUTO: 9.7 FL (ref 9.4–12.4)
POTASSIUM SERPL-SCNC: 4.2 MEQ/L (ref 3.5–5.2)
RBC # BLD AUTO: 4 MILL/MM3 (ref 4.2–5.4)
SODIUM SERPL-SCNC: 141 MEQ/L (ref 135–145)
WBC # BLD AUTO: 9.4 THOU/MM3 (ref 4.8–10.8)

## 2024-07-04 PROCEDURE — 2580000003 HC RX 258

## 2024-07-04 PROCEDURE — 93971 EXTREMITY STUDY: CPT

## 2024-07-04 PROCEDURE — 99239 HOSP IP/OBS DSCHRG MGMT >30: CPT | Performed by: NURSE PRACTITIONER

## 2024-07-04 PROCEDURE — 1200000000 HC SEMI PRIVATE

## 2024-07-04 PROCEDURE — 2580000003 HC RX 258: Performed by: NURSE PRACTITIONER

## 2024-07-04 PROCEDURE — 6360000002 HC RX W HCPCS: Performed by: NURSE PRACTITIONER

## 2024-07-04 PROCEDURE — 96375 TX/PRO/DX INJ NEW DRUG ADDON: CPT

## 2024-07-04 PROCEDURE — 2580000003 HC RX 258: Performed by: INTERNAL MEDICINE

## 2024-07-04 PROCEDURE — 6370000000 HC RX 637 (ALT 250 FOR IP)

## 2024-07-04 PROCEDURE — 36415 COLL VENOUS BLD VENIPUNCTURE: CPT

## 2024-07-04 PROCEDURE — 6370000000 HC RX 637 (ALT 250 FOR IP): Performed by: NURSE PRACTITIONER

## 2024-07-04 PROCEDURE — 83735 ASSAY OF MAGNESIUM: CPT

## 2024-07-04 PROCEDURE — 96376 TX/PRO/DX INJ SAME DRUG ADON: CPT

## 2024-07-04 PROCEDURE — 85025 COMPLETE CBC W/AUTO DIFF WBC: CPT

## 2024-07-04 PROCEDURE — 6360000002 HC RX W HCPCS: Performed by: INTERNAL MEDICINE

## 2024-07-04 PROCEDURE — 2500000003 HC RX 250 WO HCPCS: Performed by: NURSE PRACTITIONER

## 2024-07-04 PROCEDURE — 96361 HYDRATE IV INFUSION ADD-ON: CPT

## 2024-07-04 PROCEDURE — 80048 BASIC METABOLIC PNL TOTAL CA: CPT

## 2024-07-04 RX ORDER — DEXAMETHASONE SODIUM PHOSPHATE 10 MG/ML
10 INJECTION, EMULSION INTRAMUSCULAR; INTRAVENOUS DAILY
Status: DISCONTINUED | OUTPATIENT
Start: 2024-07-05 | End: 2024-07-04 | Stop reason: HOSPADM

## 2024-07-04 RX ORDER — DIPHENHYDRAMINE HYDROCHLORIDE 50 MG/ML
25 INJECTION INTRAMUSCULAR; INTRAVENOUS EVERY 6 HOURS
Status: DISCONTINUED | OUTPATIENT
Start: 2024-07-04 | End: 2024-07-04 | Stop reason: HOSPADM

## 2024-07-04 RX ORDER — DIPHENHYDRAMINE HYDROCHLORIDE 50 MG/ML
25 INJECTION INTRAMUSCULAR; INTRAVENOUS ONCE
Status: COMPLETED | OUTPATIENT
Start: 2024-07-04 | End: 2024-07-04

## 2024-07-04 RX ORDER — DEXAMETHASONE SODIUM PHOSPHATE 4 MG/ML
10 INJECTION, SOLUTION INTRA-ARTICULAR; INTRALESIONAL; INTRAMUSCULAR; INTRAVENOUS; SOFT TISSUE ONCE
Status: COMPLETED | OUTPATIENT
Start: 2024-07-04 | End: 2024-07-04

## 2024-07-04 RX ORDER — CETIRIZINE HYDROCHLORIDE 10 MG/1
10 TABLET ORAL DAILY
Status: DISCONTINUED | OUTPATIENT
Start: 2024-07-04 | End: 2024-07-04 | Stop reason: HOSPADM

## 2024-07-04 RX ADMIN — SODIUM CHLORIDE, PRESERVATIVE FREE 40 MG: 5 INJECTION INTRAVENOUS at 08:50

## 2024-07-04 RX ADMIN — FAMOTIDINE 20 MG: 10 INJECTION, SOLUTION INTRAVENOUS at 09:30

## 2024-07-04 RX ADMIN — SODIUM CHLORIDE: 9 INJECTION, SOLUTION INTRAVENOUS at 16:42

## 2024-07-04 RX ADMIN — METOPROLOL TARTRATE 12.5 MG: 25 TABLET, FILM COATED ORAL at 08:43

## 2024-07-04 RX ADMIN — DIPHENHYDRAMINE HYDROCHLORIDE 25 MG: 50 INJECTION INTRAMUSCULAR; INTRAVENOUS at 15:53

## 2024-07-04 RX ADMIN — DEXAMETHASONE SODIUM PHOSPHATE 10 MG: 4 INJECTION, SOLUTION INTRA-ARTICULAR; INTRALESIONAL; INTRAMUSCULAR; INTRAVENOUS; SOFT TISSUE at 09:30

## 2024-07-04 RX ADMIN — DILTIAZEM HYDROCHLORIDE 120 MG: 120 CAPSULE, COATED, EXTENDED RELEASE ORAL at 08:43

## 2024-07-04 RX ADMIN — CETIRIZINE HYDROCHLORIDE 10 MG: 10 TABLET ORAL at 10:47

## 2024-07-04 RX ADMIN — PIPERACILLIN AND TAZOBACTAM 4500 MG: 4; .5 INJECTION, POWDER, LYOPHILIZED, FOR SOLUTION INTRAVENOUS at 16:43

## 2024-07-04 RX ADMIN — DIPHENHYDRAMINE HYDROCHLORIDE 25 MG: 50 INJECTION INTRAMUSCULAR; INTRAVENOUS at 09:30

## 2024-07-04 RX ADMIN — SODIUM CHLORIDE, POTASSIUM CHLORIDE, SODIUM LACTATE AND CALCIUM CHLORIDE: 600; 310; 30; 20 INJECTION, SOLUTION INTRAVENOUS at 17:30

## 2024-07-04 ASSESSMENT — PAIN SCALES - GENERAL
PAINLEVEL_OUTOF10: 0
PAINLEVEL_OUTOF10: 0

## 2024-07-04 NOTE — PROGRESS NOTES
Pharmacy Note - Extended Infusion Beta-Lactam Dose Adjustment    Piperacillin/Tazobactam 3375 mg q8h extended infusion ordered for the treatment of Surgical prophylaxis. Per [facility] Extended Infusion Beta-Lactam Policy, this will be changed to 4500 mg loading dose followed by 3375 mg q8h extended infusion     Estimated Creatinine Clearance: Estimated Creatinine Clearance: 78 mL/min (based on SCr of 0.5 mg/dL).    Dialysis Status, MOUNIKA, CKD: Normal    BMI: Body mass index is 33.33 kg/m².    Rationale for Adjustment: Dose adjusted per [facility] Extended Infusion Policy based on renal function and indication. The above medication is renally eliminated and demonstrates time-dependent effects on bacterial eradication. Extended-infusion dosing strategy aims to enhance microbiologic and clinical efficacy.     Pharmacy will monitor renal function daily and adjust dose as necessary.      Please call with any questions.    Thank you,  Lenora Lieberman, PharmD, BCPS  7/4/2024  4:04 PM

## 2024-07-04 NOTE — PROGRESS NOTES
[facility]  Gastroenterology Progress Note      Patient's Name/Date of Birth: Angélica HERNANDEZ [place] / 1940 (83 y.o.)  MRN: [id number]  Visit Date: 2024   Admit Date: 2024  1:23 AM  Referring Provider: Jovany Rodriges,*  Primary Care Physician: Holli Montgomery APRN - CNP   6K-06/006-A       SUBJECTIVE:    Patient seen and examined.  24 hours events and chart reviewed. No acute issues overnight. No abdominal pain, N/V/D, F/C. Has swelling of right arm.     OBJECTIVE:  Physical Exam  VITALS:  BP (!) 159/68   Pulse 96   Temp 98.1 °F (36.7 °C) (Oral)   Resp 16   Ht 1.524 m (5')   Wt 77.4 kg (170 lb 10.2 oz)   SpO2 96%   BMI 33.33 kg/m²  Body mass index is 33.33 kg/m².  TEMPERATURE:  Current - Temp: 98.1 °F (36.7 °C); Max - Temp  Av.1 °F (36.7 °C)  Min: 97.6 °F (36.4 °C)  Max: 98.7 °F (37.1 °C)    General:  No acute distress, A&Ox3  Eyes: anicteric sclera, no pallor, EOMI, PERRLA  Neck: Supple, no JVD, thyromegaly or masses, trachea midline  Heart: RRR, no murmurs, rubs or gallops  Lungs: CTAB, No wheezes, rales or rhonchi, normal respiratory effort  Abdomen: soft, nondistended, TTP of RUQ, epigastrium, BS+  Extremities: No clubbing, cyanosis or edema  Neurologic:  No asterixis noted, CN intact grossly, A&Ox3, moving all extremities  Skin: No rashes or lesions, no jaundice      Labs:   CBC:   Lab Results   Component Value Date/Time    WBC 9.4 2024 06:31 AM    HGB 11.9 2024 06:31 AM    HGB 14.3 2012 11:56 AM    HCT 37.1 2024 06:31 AM    MCV 92.8 2024 06:31 AM     2024 06:31 AM     BMP:   Lab Results   Component Value Date/Time     2024 06:31 AM    K 4.2 2024 06:31 AM    K 4.1 10/14/2023 06:36 AM     2024 06:31 AM    CO2 20 2024 06:31 AM    BUN 25 2024 06:31 AM    CREATININE 0.5 2024 06:31 AM    CALCIUM 8.7 2024 06:31 AM     PT/INR: No results found for: \"PROTIME\", \"INR\"  LFTs:   Lab Results    Component Value Date/Time    ALKPHOS 169 07/03/2024 05:44 AM     07/03/2024 05:44 AM     07/03/2024 05:44 AM    BILITOT 0.8 07/03/2024 05:44 AM    BILIDIR 0.3 07/03/2024 05:44 AM    AMYLASE 40 04/05/2013 11:57 AM    LIPASE 50.1 07/02/2024 01:38 AM       Current Meds:  Scheduled Meds:   famotidine (PEPCID) injection  20 mg IntraVENous BID    cetirizine  10 mg Oral Daily    [START ON 7/5/2024] dexAMETHasone  10 mg IntraVENous Daily    diphenhydrAMINE  25 mg IntraVENous Q6H    pantoprazole (PROTONIX) 40 mg in sodium chloride (PF) 0.9 % 10 mL injection  40 mg IntraVENous Q12H    sodium chloride flush  5-40 mL IntraVENous 2 times per day    [Held by provider] aspirin  81 mg Oral Daily    [Held by provider] atorvastatin  20 mg Oral Daily    dilTIAZem  120 mg Oral Daily    [Held by provider] lisinopril  10 mg Oral Daily    metoprolol tartrate  12.5 mg Oral BID    [Held by provider] ticagrelor  60 mg Oral BID     Continuous Infusions:   sodium chloride 75 mL/hr at 07/03/24 1649    lactated ringers IV soln Stopped (07/04/24 1513)    sodium chloride         Diet:  ADULT DIET; Clear Liquid    Code Status:   Full Code    ASSESSMENT:    83 year old female who presented with epigastric abdominal pain with radiation to the RUQ. Noted to have elevated LFTs, normal bilirubin. RUQ US 7/2/24 with hypoechoic structure with peripheral calcifications in the right hepatic lobe of indeterminate etiology, likely chronic. MRCP 7/2/24 Several punctate filling defects in the common bile duct suspicious for biliary duct stones. Common bile duct is mildly dilated measuring 6-7 mm suspicious for biliary obstruction. Status post cholecystectomy. Small hiatal hernia. A 3.7 x 3.3 cm peripherally calcified cyst in the left lobe of the liver, slightly decreased in size. 1.0 cm lesion at the dome of the liver suspicious for a hemangioma ERCP 7/3/24 failed due to inability to cannulated CBD, suspect biliary stricture, extravasation of

## 2024-07-04 NOTE — PLAN OF CARE
Problem: Discharge Planning  Goal: Discharge to home or other facility with appropriate resources  7/3/2024 2205 by Alison Lutz, RN  Outcome: Progressing  Flowsheets (Taken 7/3/2024 2205)  Discharge to home or other facility with appropriate resources:   Identify barriers to discharge with patient and caregiver   Identify discharge learning needs (meds, wound care, etc)   Refer to discharge planning if patient needs post-hospital services based on physician order or complex needs related to functional status, cognitive ability or social support system   Arrange for needed discharge resources and transportation as appropriate   Arrange for interpreters to assist at discharge as needed  Note: Feedback readiness for discharge.  Promoting inclusion for discharge planning.   7/3/2024 1022 by Vickie Santiago, RN  Outcome: Progressing     Problem: Pain  Goal: Verbalizes/displays adequate comfort level or baseline comfort level  Outcome: Progressing  Flowsheets (Taken 7/3/2024 2205)  Verbalizes/displays adequate comfort level or baseline comfort level:   Encourage patient to monitor pain and request assistance   Administer analgesics based on type and severity of pain and evaluate response   Consider cultural and social influences on pain and pain management   Assess pain using appropriate pain scale   Implement non-pharmacological measures as appropriate and evaluate response   Notify Licensed Independent Practitioner if interventions unsuccessful or patient reports new pain  Note: Utilize correct pain assessment tool based on patient abilities.  Use appropriate pain medications base on appropriate tools.  Utilize non-pharmacologic pain reduction methods to supplement ordered pain medications.Educate patient on pain control.  Educate patient on acceptable pain level with chronic pain.   Talk to patient about non-pharmaceutical pain interventions.Encourage use of medication when needed.  Promote rest and distractions

## 2024-07-04 NOTE — PROGRESS NOTES
Hospitalist Progress Note    Patient:  Angélica Biswas      Unit/Bed:6K-06/006-A    YOB: 1940    MRN: [id number]       Acct: [id number]     PCP: Holli Montgomery APRN - CNP    Date of Admission: 7/2/2024    Assessment/Plan:    Angioedema. Possible allergic reaction. Right hand edema. Start 10 mg IV Decadron daily, Benadryl 25 mg every 6 hrs IV x 24 hours, Pepcid IV BID and Zyretc. Monitor airway closely.   Intractable epigastric pain with nausea/vomiting, resolved. IV Protonix drip transitioned to IVP BID. Antiemetics PRN.  Choledocholithiasis with transaminitis: Up sanchez trend. MRCP suspicious for bilary duct stones, CBD dilated measuring 6-7 MM. GI consulted. ERCP attempted 7/3. Has biliary stricture. Will need Biliary drain placed once DAPT washed out. Will discuss case with IR in AM. Ok to start CL diet. Hepatitis panel negative.   Incidental findings of large left renal calculi without hydronephrosis: outpatient f/u.    Constipation, resolved  Leukocytosis, resolved.   Chronic Conditions   GERD: see #1.   Primary hypertension: resume home medications with hold parameters  CAD: holding DAPT. Restart pending GI recs.   Obesity. BMI 33.33      Chief Complaint: abdominal pain     Hospital Course:     Angélica Biswas is a 83 y.o. female with PMHx of GERD, hypertension, CAD who presents to [facility] with abdominal pain.  Patient admits to waking up with severe epigastric pain rating the pain 10/10 and sharp in quality. Denies any radiation of the pain. Nothing makes the pain better or worse. Patient admits to having multiple episodes of vomiting and denies hematemesis.      Denies any history of PUD, chest pain, shortness of breath, fevers, chills, or rigors. Denies alcohol use. Denies any recent sick contacts or bad food.     ED course: labs drawn, imaging obtained    Subjective: Denies abd pain. No n/v/d. Wants to try clears. Upper lip edema reported. Family state it started  last night following procedure. Worsening this AM. No dysphagia. Handling her own secretions. No tongue edema.       Medications:  Reviewed    Infusion Medications    sodium chloride 75 mL/hr at 07/03/24 1649    lactated ringers IV soln 100 mL/hr at 07/04/24 0652    sodium chloride       Scheduled Medications    famotidine (PEPCID) injection  20 mg IntraVENous BID    cetirizine  10 mg Oral Daily    pantoprazole (PROTONIX) 40 mg in sodium chloride (PF) 0.9 % 10 mL injection  40 mg IntraVENous Q12H    sodium chloride flush  5-40 mL IntraVENous 2 times per day    [Held by provider] aspirin  81 mg Oral Daily    [Held by provider] atorvastatin  20 mg Oral Daily    dilTIAZem  120 mg Oral Daily    [Held by provider] lisinopril  10 mg Oral Daily    metoprolol tartrate  12.5 mg Oral BID    [Held by provider] ticagrelor  60 mg Oral BID     PRN Meds: sodium chloride flush, sodium chloride, potassium chloride **OR** potassium alternative oral replacement **OR** potassium chloride, magnesium sulfate, ondansetron **OR** ondansetron, polyethylene glycol, [Held by provider] acetaminophen **OR** [Held by provider] acetaminophen, morphine **OR** morphine      Intake/Output Summary (Last 24 hours) at 7/4/2024 1156  Last data filed at 7/4/2024 0652  Gross per 24 hour   Intake 1985.49 ml   Output --   Net 1985.49 ml         Diet:  ADULT DIET; Clear Liquid    Exam:  BP (!) 151/69   Pulse 97   Temp 98.1 °F (36.7 °C) (Oral)   Resp 16   Ht 1.524 m (5')   Wt 77.4 kg (170 lb 10.2 oz)   SpO2 96%   BMI 33.33 kg/m²     General appearance: No apparent distress, appears stated age and cooperative.  HEENT: Pupils equal, round, and reactive to light. Conjunctivae/corneas clear.  Neck: Supple, with full range of motion. No jugular venous distention. Trachea midline.  Respiratory:  Normal respiratory effort. Clear to auscultation, bilaterally without Rales/Wheezes/Rhonchi.  Cardiovascular: Regular rate and rhythm with normal S1/S2 without

## 2024-07-04 NOTE — PLAN OF CARE
Problem: Discharge Planning  Goal: Discharge to home or other facility with appropriate resources  Outcome: Progressing   Patient will be discharged home.     Problem: Pain  Goal: Verbalizes/displays adequate comfort level or baseline comfort level  Outcome: Progressing   Patient will verbalize pain level. Pain will be treated per medications and patients pain level.     Problem: Safety - Adult  Goal: Free from fall injury  Outcome: Progressing   Patient will not fall this shift. Patients belongings are within reach of the patient. Patient will use call light appropriately for assistance.

## 2024-07-04 NOTE — PLAN OF CARE
VM received from Dr. Maxwell (GI)  Requesting transfer to [facility] or [facility].   Patient preference [facility].  Transfer center was contacted.  Report given to [facility].  Likely will have a bed hold.    Jovany Rodriges, EDER - CNP

## 2024-07-04 NOTE — PROGRESS NOTES
[facility]  Gastroenterology Progress Note      Patient's Name/Date of Birth: Angélica HERNANDEZ [place] / 1940 (83 y.o.)  MRN: [id number]  Visit Date: 2024   Admit Date: 2024  1:23 AM  Referring Provider: Jovany Rodriges,*  Primary Care Physician: Holli Montgomery APRN - CNP   [id number]       SUBJECTIVE:    Patient seen and examined.  24 hours events and chart reviewed. No acute issues overnight. No abdominal pain, N/V/D, F/C.     OBJECTIVE:  Physical Exam  VITALS:  BP (!) 158/69   Pulse 91   Temp 98.3 °F (36.8 °C) (Oral)   Resp 16   Ht 1.524 m (5')   Wt 77.4 kg (170 lb 10.2 oz)   SpO2 99%   BMI 33.33 kg/m²  Body mass index is 33.33 kg/m².  TEMPERATURE:  Current - Temp: 98.3 °F (36.8 °C); Max - Temp  Av.2 °F (36.8 °C)  Min: 97.6 °F (36.4 °C)  Max: 98.6 °F (37 °C)    General:  No acute distress, A&Ox3  Eyes: anicteric sclera, no pallor, EOMI, PERRLA  Neck: Supple, no JVD, thyromegaly or masses, trachea midline  Heart: RRR, no murmurs, rubs or gallops  Lungs: CTAB, No wheezes, rales or rhonchi, normal respiratory effort  Abdomen: soft, nondistended, TTP of RUQ, BS+  Extremities: No clubbing, cyanosis or edema  Neurologic:  No asterixis noted, CN intact grossly, A&Ox3, moving all extremities  Skin: No rashes or lesions, no jaundice      Labs:   CBC:   Lab Results   Component Value Date/Time    WBC 7.1 2024 05:44 AM    HGB 12.0 2024 05:44 AM    HGB 14.3 2012 11:56 AM    HCT 37.4 2024 05:44 AM    MCV 91.9 2024 05:44 AM     2024 05:44 AM     BMP:   Lab Results   Component Value Date/Time     2024 05:44 AM    K 3.8 2024 05:44 AM    K 4.1 10/14/2023 06:36 AM     2024 05:44 AM    CO2 25 2024 05:44 AM    BUN 19 2024 05:44 AM    CREATININE 0.5 2024 05:44 AM    CALCIUM 8.9 2024 05:44 AM     PT/INR: No results found for: \"PROTIME\", \"INR\"  LFTs:   Lab Results   Component Value Date/Time    ALKPHOS 169  07/03/2024 05:44 AM     07/03/2024 05:44 AM     07/03/2024 05:44 AM    BILITOT 0.8 07/03/2024 05:44 AM    BILIDIR 0.3 07/03/2024 05:44 AM    AMYLASE 40 04/05/2013 11:57 AM    LIPASE 50.1 07/02/2024 01:38 AM       Current Meds:  Scheduled Meds:   pantoprazole (PROTONIX) 40 mg in sodium chloride (PF) 0.9 % 10 mL injection  40 mg IntraVENous Q12H    piperacillin-tazobactam        sodium chloride flush  5-40 mL IntraVENous 2 times per day    [Held by provider] aspirin  81 mg Oral Daily    [Held by provider] atorvastatin  20 mg Oral Daily    dilTIAZem  120 mg Oral Daily    [Held by provider] lisinopril  10 mg Oral Daily    metoprolol tartrate  12.5 mg Oral BID    [Held by provider] ticagrelor  60 mg Oral BID     Continuous Infusions:   sodium chloride 75 mL/hr at 07/03/24 1649    sodium chloride      lactated ringers IV soln 100 mL/hr at 07/03/24 2052    sodium chloride         Diet:  Diet NPO    Code Status:   Full Code    ASSESSMENT:    83 year old female who presented with epigastric abdominal pain with radiation to the RUQ. Noted to have elevated LFTs, normal bilirubin. RUQ US 7/2/24 with hypoechoic structure with peripheral calcifications in the right hepatic lobe of indeterminate etiology, likely chronic. MRCP 7/2/24 Several punctate filling defects in the common bile duct suspicious for biliary duct stones. Common bile duct is mildly dilated measuring 6-7 mm suspicious for biliary obstruction. Status post cholecystectomy. Small hiatal hernia. A 3.7 x 3.3 cm peripherally calcified cyst in the left lobe of the liver, slightly decreased in size. 1.0 cm lesion at the dome of the liver suspicious for a hemangioma     RUQ abdominal pain  Epigastric abdominal pain  Nausea  vomiting  Choledocholithiasis  Elevated LFTs with normal bilirubin  S/p cholecystectomy  GERD  HLD  HTN  Osteoporosis  CAD    PLAN:    supportive care per primary team  continue IV PPI  Brilinta currently being held  NPO  Plan for ERCP

## 2024-07-05 PROBLEM — D72.829 LEUKOCYTOSIS: Status: ACTIVE | Noted: 2024-07-05

## 2024-07-05 PROBLEM — E66.9 OBESITY (BMI 30.0-34.9): Status: ACTIVE | Noted: 2024-07-05

## 2024-07-05 PROBLEM — E66.811 OBESITY (BMI 30.0-34.9): Status: ACTIVE | Noted: 2024-07-05

## 2024-07-05 PROBLEM — T78.3XXA ANGIO-EDEMA: Status: ACTIVE | Noted: 2024-07-05

## 2024-07-05 PROBLEM — R74.01 TRANSAMINITIS: Status: ACTIVE | Noted: 2024-07-05

## 2024-07-05 NOTE — DISCHARGE SUMMARY
Hospital Medicine Discharge Summary      Patient Identification:   Angélica Biswas   : 1940  MRN: [id number]   Account: [id number]      Patient's PCP: Holli Montgomery APRN - CNP    Admit Date: 2024     Discharge Date: 2024      Admitting Physician: EDER Gaytan CNP     Discharge Physician: EDER Monson CNP     Discharge Diagnoses and Hospital Course:    Presented to the ED with abdominal pain, nausea and vomiting.    Angioedema, post procedure. Possible allergic reaction. Also has right hand edema. No systemic rash noted. Maintain her secretions. Started on 10 mg IV Decadron daily, Benadryl 25 mg every 6 hrs IV x 24 hours, Pepcid IV BID and Zyretc. Airway was monitored closely.   Intractable epigastric pain with nausea/vomiting, resolved. IV Protonix drip transitioned to IVP BID. Antiemetics PRN.  Choledocholithiasis with transaminitis: Up sanchez trend. MRCP suspicious for bilary duct stones, CBD dilated measuring 6-7 MM. Hx of cholecystectomy. GI consulted. ERCP attempted 7/3. Has biliary stricture. Dr. Clark recommended Biliary drain placed once DAPT washed out. Discussed case with Dr. Brigette Maxwell today. She is recommended transfer to tertiary center for re-attempt of ERCP. Hepatitis panel negative. Tolerating clears.  Incidental findings of large left renal calculi without hydronephrosis: outpatient f/u.    Constipation, resolved  Leukocytosis, resolved.   GERD.  Primary hypertension: resumed home medications with hold parameters  CAD: holding DAPT for potential procedures. Last stent was . .   Obesity. BMI 33.33    [facility] accepted patient. She is in stable condition for ground transport.     The patient was seen and examined on day of discharge and this discharge summary is in conjunction with any daily progress note from day of discharge.          Exam:     Vitals:  Vitals:    24 0457 24 0833 24 1305 24 1639   BP: 128/78 (!) 151/69  06:31 AM    BUN 25 07/04/2024 06:31 AM    CREATININE 0.5 07/04/2024 06:31 AM    CALCIUM 8.7 07/04/2024 06:31 AM         Significant Diagnostic Studies    Radiology:   Vascular duplex upper extremity venous right   Final Result   No evidence of a DVT.               **This report has been created using voice recognition software. It may contain   minor errors which are inherent in voice recognition technology.**            Electronically signed by Dr Chao Dominguez      FL ERCP BILIARY S&I   Final Result   Fluoroscopic guidance for ERCP.            **This report has been created using voice recognition software.  It may contain   minor errors which are inherent in voice recognition technology.**      Electronically signed by Dr. Kimberly Hernandez      MRI ABDOMEN W WO CONTRAST   Final Result      Several punctate filling defects in the common bile duct suspicious for    biliary duct stones. Common bile duct is mildly dilated measuring 6-7 mm    suspicious for biliary obstruction.      Status post cholecystectomy.      Small hiatal hernia.      3.7 x 3.3 cm peripherally calcified cyst in the left lobe of the liver,    slightly decreased in size.      1.0 cm lesion at the dome of the liver suspicious for a hemangioma.      This document has been electronically signed by: Kale Rider MD on    07/02/2024 10:40 PM      US ABDOMEN LIMITED Specify organ? LIVER, GALLBLADDER, PANCREAS   Final Result   1. Prior cholecystectomy.   2. Hypoechoic structure with peripheral calcifications in the right hepatic lobe   of indeterminate etiology, likely chronic.            **This report has been created using voice recognition software. It may contain   minor errors which are inherent in voice recognition technology.**         Electronically signed by Dr. Ambrose Eckert      CT ABDOMEN PELVIS W IV CONTRAST Additional Contrast? None   Final Result   Large left renal pelvic stone without evidence for obstruction at this    time.

## 2024-07-11 ENCOUNTER — TELEPHONE (OUTPATIENT)
Dept: CARDIOLOGY CLINIC | Age: 84
End: 2024-07-11

## 2024-07-11 NOTE — TELEPHONE ENCOUNTER
[facility] medication changes- patient tolerating well- wants Dr. Montejo to review and approve    Discontinued metoprolol  Started carvedilol 6.25 mg twice daily    Discontinued lisinopril   Started spironolactone 25 mg daily    Ok to update med list?

## 2024-07-12 NOTE — TELEPHONE ENCOUNTER
I am in a position to approve or disapprove the order of any other fully licensed doctor and institute for that matter.  When I see the pat , if I believe there is a need for change I will address.

## 2024-07-15 ENCOUNTER — OFFICE VISIT (OUTPATIENT)
Dept: CARDIOLOGY CLINIC | Age: 84
End: 2024-07-15
Payer: MEDICARE

## 2024-07-15 VITALS
SYSTOLIC BLOOD PRESSURE: 140 MMHG | BODY MASS INDEX: 28.86 KG/M2 | HEART RATE: 63 BPM | WEIGHT: 147 LBS | HEIGHT: 60 IN | DIASTOLIC BLOOD PRESSURE: 73 MMHG

## 2024-07-15 DIAGNOSIS — R00.1 SINUS BRADYCARDIA: ICD-10-CM

## 2024-07-15 DIAGNOSIS — E78.00 PURE HYPERCHOLESTEROLEMIA: ICD-10-CM

## 2024-07-15 DIAGNOSIS — Z95.820 S/P ANGIOPLASTY WITH STENT: ICD-10-CM

## 2024-07-15 DIAGNOSIS — Z98.890 S/P CARDIAC CATH: ICD-10-CM

## 2024-07-15 DIAGNOSIS — I25.10 CORONARY ARTERY DISEASE INVOLVING NATIVE CORONARY ARTERY OF NATIVE HEART WITHOUT ANGINA PECTORIS: Primary | ICD-10-CM

## 2024-07-15 DIAGNOSIS — I10 PRIMARY HYPERTENSION: ICD-10-CM

## 2024-07-15 PROCEDURE — 1036F TOBACCO NON-USER: CPT | Performed by: INTERNAL MEDICINE

## 2024-07-15 PROCEDURE — G8417 CALC BMI ABV UP PARAM F/U: HCPCS | Performed by: INTERNAL MEDICINE

## 2024-07-15 PROCEDURE — 1090F PRES/ABSN URINE INCON ASSESS: CPT | Performed by: INTERNAL MEDICINE

## 2024-07-15 PROCEDURE — G8427 DOCREV CUR MEDS BY ELIG CLIN: HCPCS | Performed by: INTERNAL MEDICINE

## 2024-07-15 PROCEDURE — 99215 OFFICE O/P EST HI 40 MIN: CPT | Performed by: INTERNAL MEDICINE

## 2024-07-15 PROCEDURE — 3077F SYST BP >= 140 MM HG: CPT | Performed by: INTERNAL MEDICINE

## 2024-07-15 PROCEDURE — G8399 PT W/DXA RESULTS DOCUMENT: HCPCS | Performed by: INTERNAL MEDICINE

## 2024-07-15 PROCEDURE — 1111F DSCHRG MED/CURRENT MED MERGE: CPT | Performed by: INTERNAL MEDICINE

## 2024-07-15 PROCEDURE — 1123F ACP DISCUSS/DSCN MKR DOCD: CPT | Performed by: INTERNAL MEDICINE

## 2024-07-15 PROCEDURE — 3078F DIAST BP <80 MM HG: CPT | Performed by: INTERNAL MEDICINE

## 2024-07-15 RX ORDER — CARVEDILOL 6.25 MG/1
6.25 TABLET ORAL EVERY 12 HOURS
COMMUNITY
Start: 2024-07-09

## 2024-07-15 RX ORDER — SPIRONOLACTONE AND HYDROCHLOROTHIAZIDE 25; 25 MG/1; MG/1
0.5 TABLET ORAL DAILY
Qty: 45 TABLET | Refills: 2 | Status: SHIPPED | OUTPATIENT
Start: 2024-07-15

## 2024-07-15 RX ORDER — SPIRONOLACTONE 25 MG/1
25 TABLET ORAL DAILY
COMMUNITY
Start: 2024-07-09 | End: 2024-07-15

## 2024-07-15 NOTE — PROGRESS NOTES
diet and exercise patterns to aid in medical management of this problem.  Advised more plant based nutrition/meditarrean diet   Advised patient to call office or seek immediate medical attention if there is any new onset of  any chest pain, sob, palpitations, lightheadedness, dizziness, orthopnea, PND or pedal edema.   All medication side effects were discussed in details.      I spent 40 minutes involved in face-to-face discussion of medical issues, prognosis, record review  and plan with the patient today and more than 50% of the time was spent on counseling and coordination of care      F/U 4 months    Titi Montejo MD, FACC

## 2024-07-16 RX ORDER — SPIRONOLACTONE AND HYDROCHLOROTHIAZIDE 25; 25 MG/1; MG/1
0.5 TABLET ORAL DAILY
Qty: 45 TABLET | Refills: 2 | Status: CANCELLED | OUTPATIENT
Start: 2024-07-16

## 2024-10-09 ENCOUNTER — HOSPITAL ENCOUNTER (OUTPATIENT)
Age: 84
Discharge: HOME | End: 2024-10-09
Payer: MEDICARE

## 2024-10-09 DIAGNOSIS — R00.1 SINUS BRADYCARDIA: ICD-10-CM

## 2024-10-09 DIAGNOSIS — I25.10 CORONARY ARTERY DISEASE INVOLVING NATIVE CORONARY ARTERY OF NATIVE HEART WITHOUT ANGINA PECTORIS: ICD-10-CM

## 2024-10-09 DIAGNOSIS — I10 PRIMARY HYPERTENSION: ICD-10-CM

## 2024-10-09 DIAGNOSIS — E78.00 PURE HYPERCHOLESTEROLEMIA: ICD-10-CM

## 2024-10-09 LAB
ALBUMIN SERPL BCG-MCNC: 4.3 G/DL (ref 3.5–5.1)
ALP SERPL-CCNC: 92 U/L (ref 38–126)
ALT SERPL W/O P-5'-P-CCNC: 23 U/L (ref 11–66)
AST SERPL-CCNC: 25 U/L (ref 5–40)
BILIRUB CONJ SERPL-MCNC: < 0.1 MG/DL (ref 0.1–13.8)
BILIRUB SERPL-MCNC: 0.5 MG/DL (ref 0.3–1.2)
CHOLEST SERPL-MCNC: 172 MG/DL (ref 100–199)
HDLC SERPL-MCNC: 60 MG/DL
LDLC SERPL CALC-MCNC: 92 MG/DL
PROT SERPL-MCNC: 7.3 G/DL (ref 6.1–8)
TRIGL SERPL-MCNC: 102 MG/DL (ref 0–199)

## 2024-10-09 PROCEDURE — 80061 LIPID PANEL: CPT

## 2024-10-09 PROCEDURE — 80076 HEPATIC FUNCTION PANEL: CPT

## 2024-10-09 PROCEDURE — 36415 COLL VENOUS BLD VENIPUNCTURE: CPT

## 2024-10-09 RX ORDER — CARVEDILOL 6.25 MG/1
6.25 TABLET ORAL EVERY 12 HOURS
Qty: 60 TABLET | Refills: 2 | Status: SHIPPED | OUTPATIENT
Start: 2024-10-09

## 2024-11-18 ENCOUNTER — OFFICE VISIT (OUTPATIENT)
Dept: CARDIOLOGY CLINIC | Age: 84
End: 2024-11-18

## 2024-11-18 VITALS
BODY MASS INDEX: 28.31 KG/M2 | WEIGHT: 144.2 LBS | HEIGHT: 60 IN | DIASTOLIC BLOOD PRESSURE: 70 MMHG | HEART RATE: 75 BPM | SYSTOLIC BLOOD PRESSURE: 153 MMHG

## 2024-11-18 DIAGNOSIS — R00.2 HEART PALPITATIONS: ICD-10-CM

## 2024-11-18 DIAGNOSIS — E78.00 PURE HYPERCHOLESTEROLEMIA: ICD-10-CM

## 2024-11-18 DIAGNOSIS — Z98.890 S/P CARDIAC CATH: ICD-10-CM

## 2024-11-18 DIAGNOSIS — R00.0 SINUS TACHYCARDIA: ICD-10-CM

## 2024-11-18 DIAGNOSIS — I10 PRIMARY HYPERTENSION: ICD-10-CM

## 2024-11-18 DIAGNOSIS — I25.10 CORONARY ARTERY DISEASE INVOLVING NATIVE CORONARY ARTERY OF NATIVE HEART WITHOUT ANGINA PECTORIS: Primary | ICD-10-CM

## 2024-11-18 DIAGNOSIS — R06.02 SOB (SHORTNESS OF BREATH) ON EXERTION: ICD-10-CM

## 2024-11-18 DIAGNOSIS — Z95.820 S/P ANGIOPLASTY WITH STENT: ICD-10-CM

## 2024-11-18 DIAGNOSIS — R00.1 SINUS BRADYCARDIA: ICD-10-CM

## 2024-11-18 NOTE — PROGRESS NOTES
Lack of Transportation (Non-Medical): No   Recent Concern: Transportation Needs - Unmet Transportation Needs (7/2/2024)    PRAPARE - Transportation     Lack of Transportation (Medical): No     Lack of Transportation (Non-Medical): Yes   Physical Activity: Not on file   Stress: Not on file   Social Connections: Not on file   Intimate Partner Violence: Not on file   Housing Stability: Low Risk  (7/4/2024)    [facility]    Housing Stability Vital Sign     Unable to Pay for Housing in the Last Year: No     Number of Places Lived in the Last Year: 1     Unstable Housing in the Last Year: No       Blood pressure (!) 153/70, pulse 75, height 1.524 m (5'), weight 65.4 kg (144 lb 3.2 oz), not currently breastfeeding.     Review of Systems   Constitutional: Negative.    HENT: Negative.    Eyes: Negative.    Respiratory: Negative.    Cardiovascular: Negative.    Gastrointestinal: Negative.    Genitourinary: Negative.    Musculoskeletal: Negative.    Skin: Negative.    Neurological: Negative.    Hematological: Negative.    Psychiatric/Behavioral: Negative.        Objective:   Physical Exam   [nursing notereviewed.  Constitutional: She is oriented to person, place, and time. Vital signs are normal. She appears well-developed and well-nourished.        [unfilled]   HENT:   Head: Normocephalic and atraumatic.   Right Ear: Hearing and external ear normal.   Left Ear: Hearing and external ear normal.   Nose: Nose normal.   Mouth/Throat: Oropharynx is clear and moist and mucous membranes are normal.   Eyes: Conjunctivae, EOM and lids are normal. Pupils are equal, round, and reactive to light. Right eye exhibits no discharge. Left eye exhibits no discharge. No scleral icterus.   Neck: Normal range of motion. Neck supple. No JVD present. No muscular tenderness present. Carotid bruit is not present. No edema and no erythema present. No

## 2025-01-13 ENCOUNTER — TELEPHONE (OUTPATIENT)
Dept: CARDIOLOGY CLINIC | Age: 85
End: 2025-01-13

## 2025-01-13 NOTE — TELEPHONE ENCOUNTER
Patient called to make sure its ok to stop her brilinta and aspirin for carpal tunnel surgery Friday 1/17/25  Brilinta was discontinued 11/18/24 OV- patient continued to take it once daily        Advise?

## 2025-01-29 RX ORDER — CARVEDILOL 6.25 MG/1
TABLET ORAL
Qty: 180 TABLET | Refills: 1 | Status: SHIPPED | OUTPATIENT
Start: 2025-01-29

## 2025-01-29 NOTE — TELEPHONE ENCOUNTER
Angélica is requesting a refill of their   Requested Prescriptions     Pending Prescriptions Disp Refills    carvedilol (COREG) 6.25 MG tablet [Pharmacy Med Name: Carvedilol 6.25 MG Oral Tablet] 60 tablet 1     Sig: TAKE 1 TABLET BY MOUTH IN THE MORNING AND 1 IN THE EVENING   . Please advise.      Last Appt:  11/18/2024  Next Appt:   5/19/2025  Preferred pharmacy: [facility] [address] [place] [address] [address] - P [phone] - F [phone]

## 2025-03-25 RX ORDER — SPIRONOLACTONE AND HYDROCHLOROTHIAZIDE 25; 25 MG/1; MG/1
TABLET ORAL
Qty: 45 TABLET | Refills: 0 | Status: SHIPPED | OUTPATIENT
Start: 2025-03-25

## 2025-04-17 ENCOUNTER — HOSPITAL ENCOUNTER (OUTPATIENT)
Age: 85
Discharge: HOME | End: 2025-04-17
Payer: MEDICARE

## 2025-04-17 DIAGNOSIS — I25.10 CORONARY ARTERY DISEASE INVOLVING NATIVE CORONARY ARTERY OF NATIVE HEART WITHOUT ANGINA PECTORIS: ICD-10-CM

## 2025-04-17 DIAGNOSIS — R00.2 HEART PALPITATIONS: ICD-10-CM

## 2025-04-17 DIAGNOSIS — E78.00 PURE HYPERCHOLESTEROLEMIA: ICD-10-CM

## 2025-04-17 DIAGNOSIS — R06.02 SOB (SHORTNESS OF BREATH) ON EXERTION: ICD-10-CM

## 2025-04-17 DIAGNOSIS — R00.1 SINUS BRADYCARDIA: ICD-10-CM

## 2025-04-17 DIAGNOSIS — Z95.820 S/P ANGIOPLASTY WITH STENT: ICD-10-CM

## 2025-04-17 DIAGNOSIS — I10 PRIMARY HYPERTENSION: ICD-10-CM

## 2025-04-17 DIAGNOSIS — Z98.890 S/P CARDIAC CATH: ICD-10-CM

## 2025-04-17 DIAGNOSIS — R00.0 SINUS TACHYCARDIA: ICD-10-CM

## 2025-04-17 LAB
ANION GAP SERPL CALC-SCNC: 13 MEQ/L (ref 8–16)
BUN SERPL-MCNC: 20 MG/DL (ref 8–23)
CALCIUM SERPL-MCNC: 10.5 MG/DL (ref 8.8–10.2)
CHLORIDE SERPL-SCNC: 103 MEQ/L (ref 98–111)
CO2 SERPL-SCNC: 20 MEQ/L (ref 22–29)
CREAT SERPL-MCNC: 0.5 MG/DL (ref 0.5–0.9)
GFR SERPL CREATININE-BSD FRML MDRD: > 90 ML/MIN/1.73M2
GLUCOSE SERPL-MCNC: 122 MG/DL (ref 74–109)
MAGNESIUM SERPL-MCNC: 2.3 MG/DL (ref 1.6–2.6)
POTASSIUM SERPL-SCNC: 3.9 MEQ/L (ref 3.5–5.2)
SODIUM SERPL-SCNC: 136 MEQ/L (ref 135–145)

## 2025-04-17 PROCEDURE — 80048 BASIC METABOLIC PNL TOTAL CA: CPT

## 2025-04-17 PROCEDURE — 83735 ASSAY OF MAGNESIUM: CPT

## 2025-04-17 PROCEDURE — 36415 COLL VENOUS BLD VENIPUNCTURE: CPT

## 2025-05-11 ENCOUNTER — APPOINTMENT (OUTPATIENT)
Dept: GENERAL RADIOLOGY | Age: 85
End: 2025-05-11
Payer: MEDICARE

## 2025-05-11 ENCOUNTER — HOSPITAL ENCOUNTER (EMERGENCY)
Age: 85
Discharge: HOME | End: 2025-05-11
Payer: MEDICARE

## 2025-05-11 VITALS
RESPIRATION RATE: 18 BRPM | DIASTOLIC BLOOD PRESSURE: 73 MMHG | BODY MASS INDEX: 28.47 KG/M2 | WEIGHT: 145 LBS | HEIGHT: 60 IN | TEMPERATURE: 98 F | SYSTOLIC BLOOD PRESSURE: 163 MMHG | HEART RATE: 75 BPM | OXYGEN SATURATION: 95 %

## 2025-05-11 DIAGNOSIS — B96.89 ACUTE BACTERIAL SINUSITIS: Primary | ICD-10-CM

## 2025-05-11 DIAGNOSIS — J01.90 ACUTE BACTERIAL SINUSITIS: Primary | ICD-10-CM

## 2025-05-11 PROCEDURE — 99213 OFFICE O/P EST LOW 20 MIN: CPT | Performed by: NURSE PRACTITIONER

## 2025-05-11 PROCEDURE — 71046 X-RAY EXAM CHEST 2 VIEWS: CPT

## 2025-05-11 PROCEDURE — 99213 OFFICE O/P EST LOW 20 MIN: CPT

## 2025-05-11 RX ORDER — DOXYCYCLINE HYCLATE 100 MG
100 TABLET ORAL 2 TIMES DAILY
Qty: 14 TABLET | Refills: 0 | Status: SHIPPED | OUTPATIENT
Start: 2025-05-11 | End: 2025-05-11 | Stop reason: CLARIF

## 2025-05-11 RX ORDER — DOXYCYCLINE HYCLATE 100 MG
100 TABLET ORAL 2 TIMES DAILY
Qty: 14 TABLET | Refills: 0 | Status: SHIPPED | OUTPATIENT
Start: 2025-05-11 | End: 2025-05-18

## 2025-05-11 ASSESSMENT — PAIN - FUNCTIONAL ASSESSMENT: PAIN_FUNCTIONAL_ASSESSMENT: NONE - DENIES PAIN

## 2025-05-11 NOTE — ED PROVIDER NOTES
[facility] URGENT CARE  UrgentCare Encounter      CHIEFCOMPLAINT       Chief Complaint   Patient presents with    Cough       Nurses Notes reviewed and I agree except as noted in the HPI.  HISTORY OF PRESENT ILLNESS     Angélica Biswas is a 84 y.o. female who presents to the urgent care for evaluation.  The history is provided by the patient.   Cold Symptoms  Presenting symptoms: congestion, cough (productive) and fatigue    Duration:  1 week  Progression:  Worsening  Ineffective treatments:  OTC medications (Coricidin HBP, Delsym)  Associated symptoms: sinus pain        The patient/patient representative has no other acute complaints at this time.    REVIEW OF SYSTEMS     Review of Systems   Constitutional:  Positive for fatigue.   HENT:  Positive for congestion and sinus pain.    Respiratory:  Positive for cough (productive) and shortness of breath.    All other systems reviewed and are negative.      PAST MEDICAL HISTORY         Diagnosis Date    Coronary artery disease involving native coronary artery of native heart without angina pectoris 5/5/2017    Gall stones     gall bladder removed in the 1970s    GERD (gastroesophageal reflux disease)     Hyperlipidemia     Hypertension     Obesity (BMI 30.0-34.9) 7/5/2024    Osteoporosis        SURGICAL HISTORY     Patient  has a past surgical history that includes Tonsillectomy (01/01/1958); Cholecystectomy (01/01/1990); Eye surgery (01/01/2010); Knee arthroscopy (2010, 2011); other surgical history (05/01/2012); knee surgery (Left, 04/01/2015); Cardiac catheterization (2017); and ERCP (N/A, 7/3/2024).    CURRENT MEDICATIONS       Previous Medications    ASPIRIN 81 MG TABLET    Take 1 tablet by mouth daily    ATORVASTATIN (LIPITOR) 20 MG TABLET    Take 1 tablet by mouth daily    CALCIUM CARBONATE-VITAMIN D (CALCIUM 500 + D PO)    Take 1 tablet by mouth daily.    CARVEDILOL (COREG) 6.25 MG TABLET    TAKE 1 TABLET BY MOUTH IN THE MORNING AND 1 IN THE EVENING

## 2025-05-19 ENCOUNTER — OFFICE VISIT (OUTPATIENT)
Dept: CARDIOLOGY CLINIC | Age: 85
End: 2025-05-19
Payer: MEDICARE

## 2025-05-19 VITALS
HEIGHT: 60 IN | SYSTOLIC BLOOD PRESSURE: 141 MMHG | WEIGHT: 146.9 LBS | BODY MASS INDEX: 28.84 KG/M2 | DIASTOLIC BLOOD PRESSURE: 75 MMHG | HEART RATE: 63 BPM

## 2025-05-19 DIAGNOSIS — I10 PRIMARY HYPERTENSION: ICD-10-CM

## 2025-05-19 DIAGNOSIS — Z98.890 S/P CARDIAC CATH: ICD-10-CM

## 2025-05-19 DIAGNOSIS — E78.00 PURE HYPERCHOLESTEROLEMIA: ICD-10-CM

## 2025-05-19 DIAGNOSIS — I25.10 CORONARY ARTERY DISEASE INVOLVING NATIVE CORONARY ARTERY OF NATIVE HEART WITHOUT ANGINA PECTORIS: Primary | ICD-10-CM

## 2025-05-19 DIAGNOSIS — Z95.820 S/P ANGIOPLASTY WITH STENT: ICD-10-CM

## 2025-05-19 DIAGNOSIS — R06.02 SOB (SHORTNESS OF BREATH) ON EXERTION: ICD-10-CM

## 2025-05-19 PROCEDURE — 99214 OFFICE O/P EST MOD 30 MIN: CPT | Performed by: INTERNAL MEDICINE

## 2025-05-19 PROCEDURE — G8417 CALC BMI ABV UP PARAM F/U: HCPCS | Performed by: INTERNAL MEDICINE

## 2025-05-19 PROCEDURE — G8427 DOCREV CUR MEDS BY ELIG CLIN: HCPCS | Performed by: INTERNAL MEDICINE

## 2025-05-19 PROCEDURE — 1160F RVW MEDS BY RX/DR IN RCRD: CPT | Performed by: INTERNAL MEDICINE

## 2025-05-19 PROCEDURE — G8399 PT W/DXA RESULTS DOCUMENT: HCPCS | Performed by: INTERNAL MEDICINE

## 2025-05-19 PROCEDURE — 1090F PRES/ABSN URINE INCON ASSESS: CPT | Performed by: INTERNAL MEDICINE

## 2025-05-19 PROCEDURE — 1159F MED LIST DOCD IN RCRD: CPT | Performed by: INTERNAL MEDICINE

## 2025-05-19 PROCEDURE — 3078F DIAST BP <80 MM HG: CPT | Performed by: INTERNAL MEDICINE

## 2025-05-19 PROCEDURE — 3077F SYST BP >= 140 MM HG: CPT | Performed by: INTERNAL MEDICINE

## 2025-05-19 PROCEDURE — 1123F ACP DISCUSS/DSCN MKR DOCD: CPT | Performed by: INTERNAL MEDICINE

## 2025-05-19 PROCEDURE — 1036F TOBACCO NON-USER: CPT | Performed by: INTERNAL MEDICINE

## 2025-05-19 RX ORDER — DILTIAZEM HYDROCHLORIDE 120 MG/1
120 CAPSULE, COATED, EXTENDED RELEASE ORAL DAILY
Qty: 90 CAPSULE | Refills: 1 | Status: SHIPPED | OUTPATIENT
Start: 2025-05-19

## 2025-05-19 RX ORDER — SPIRONOLACTONE AND HYDROCHLOROTHIAZIDE 25; 25 MG/1; MG/1
0.5 TABLET ORAL DAILY
Qty: 45 TABLET | Refills: 0 | Status: SHIPPED | OUTPATIENT
Start: 2025-05-19

## 2025-05-19 RX ORDER — NITROGLYCERIN 0.4 MG/1
TABLET SUBLINGUAL
Qty: 25 TABLET | Refills: 0 | Status: SHIPPED | OUTPATIENT
Start: 2025-05-19

## 2025-05-19 RX ORDER — CARVEDILOL 6.25 MG/1
6.25 TABLET ORAL 2 TIMES DAILY
Qty: 180 TABLET | Refills: 1 | Status: SHIPPED | OUTPATIENT
Start: 2025-05-19

## 2025-05-19 RX ORDER — ATORVASTATIN CALCIUM 20 MG/1
20 TABLET, FILM COATED ORAL DAILY
Qty: 90 TABLET | Refills: 1 | Status: SHIPPED | OUTPATIENT
Start: 2025-05-19

## 2025-05-19 NOTE — PROGRESS NOTES
Subjective:      Patient ID: Angélica Biswas is a 84 y.o. female.    HPI  No chief complaint on file.    Originally  Has some \"dull pain in her Rt breast when she touches it\". NONtender and had Mamogram and was okay   The Chest discomfort on the RT siae last 2 sec once in a while   The chest discomfort is not related to exercise  Awaiting for Barium eval  Has dysphagia at times  Has gotten better in last couple months.    She is not sure if it is heartburn.    \"Choledocholithiasis with transaminitis: Up sanchez trend. MRCP suspicious for bilary duct stones, CBD dilated measuring 6-7 MM. Hx of cholecystectomy. GI consulted. ERCP attempted 7/3. Has biliary stricture. Dr. Clark recommended Biliary drain placed once DAPT washed out. Discussed case with Dr. Brigette Maxwell today. She is recommended transfer to tertiary center for re-attempt of ERCP. Hepatitis panel negative. Tolerating clears.\"     [facility] record reviewed  \"As you may know, she is an 83 y.o. female with significant PMH of CAD s/p PCI 2017, GERD, HTN who presents as a transfer for Choledocholithiasis.    She initially presented at OSH with epigastric pain and nausea/vomiting. She had an MRI done there with concern for stones in the CBD (pt has history of cholecystectomy). She had failed ERCP done at cholecystectomy where they were unable to cannulate CBD. She was thus transferred to [facility].    Since being admitted at OSH she has had improvement in symptoms and was able to tolerate PO intake. She underwent EUS on 7/8 which showed minimally dilated CBD to 6 mm. No ERCP was indicated. She was discharged improved and stable and was advised to follow-up with PCP. \"    Hx of possible angioedema, lip swelling- noted after attempted ERCP and hence lisinopril 10 was stopped        Pt here for a 6 month f/u    Last EKG on 7/2/2025    Patient denied cp,  Palpitation, dizziness and  edema    Hx of orthostatic dizziness one time dec 2022 and seen ER and  sent home    No more

## 2025-05-21 ENCOUNTER — HOSPITAL ENCOUNTER (OUTPATIENT)
Age: 85
Discharge: HOME | End: 2025-05-21
Payer: MEDICARE

## 2025-05-21 ENCOUNTER — HOSPITAL ENCOUNTER (OUTPATIENT)
Dept: ULTRASOUND IMAGING | Age: 85
Discharge: HOME | End: 2025-05-21
Payer: MEDICARE

## 2025-05-21 DIAGNOSIS — E04.2 MULTINODULAR GOITER: ICD-10-CM

## 2025-05-21 LAB
ALBUMIN SERPL BCG-MCNC: 4.2 G/DL (ref 3.4–4.9)
ALP SERPL-CCNC: 77 U/L (ref 38–126)
ALT SERPL W/O P-5'-P-CCNC: 26 U/L (ref 10–35)
ANION GAP SERPL CALC-SCNC: 10 MEQ/L (ref 8–16)
AST SERPL-CCNC: 28 U/L (ref 10–35)
BILIRUB SERPL-MCNC: 0.6 MG/DL (ref 0.3–1.2)
BUN SERPL-MCNC: 13 MG/DL (ref 8–23)
CALCIUM SERPL-MCNC: 10.8 MG/DL (ref 8.8–10.2)
CHLORIDE SERPL-SCNC: 95 MEQ/L (ref 98–111)
CO2 SERPL-SCNC: 25 MEQ/L (ref 22–29)
CREAT SERPL-MCNC: 0.6 MG/DL (ref 0.5–0.9)
DEPRECATED RDW RBC AUTO: 43.8 FL (ref 35–45)
ERYTHROCYTE [DISTWIDTH] IN BLOOD BY AUTOMATED COUNT: 13.9 % (ref 11.5–14.5)
GFR SERPL CREATININE-BSD FRML MDRD: 88 ML/MIN/1.73M2
GLUCOSE SERPL-MCNC: 102 MG/DL (ref 74–109)
HCT VFR BLD AUTO: 40.2 % (ref 37–47)
HGB BLD-MCNC: 13.5 GM/DL (ref 12–16)
MAGNESIUM SERPL-MCNC: 2 MG/DL (ref 1.6–2.6)
MCH RBC QN AUTO: 29.3 PG (ref 26–33)
MCHC RBC AUTO-ENTMCNC: 33.6 GM/DL (ref 32.2–35.5)
MCV RBC AUTO: 87.2 FL (ref 81–99)
PLATELET # BLD AUTO: 273 THOU/MM3 (ref 130–400)
PMV BLD AUTO: 8.3 FL (ref 9.4–12.4)
POTASSIUM SERPL-SCNC: 4.8 MEQ/L (ref 3.5–5.2)
PROT SERPL-MCNC: 6.6 G/DL (ref 6.4–8.3)
RBC # BLD AUTO: 4.61 MILL/MM3 (ref 4.2–5.4)
SODIUM SERPL-SCNC: 130 MEQ/L (ref 135–145)
T4 FREE SERPL-MCNC: 1.4 NG/DL (ref 0.92–1.68)
TSH SERPL DL<=0.05 MIU/L-ACNC: 0.38 UIU/ML (ref 0.27–4.2)
WBC # BLD AUTO: 7.6 THOU/MM3 (ref 4.8–10.8)

## 2025-05-21 PROCEDURE — 85027 COMPLETE CBC AUTOMATED: CPT

## 2025-05-21 PROCEDURE — 84443 ASSAY THYROID STIM HORMONE: CPT

## 2025-05-21 PROCEDURE — 84439 ASSAY OF FREE THYROXINE: CPT

## 2025-05-21 PROCEDURE — 36415 COLL VENOUS BLD VENIPUNCTURE: CPT

## 2025-05-21 PROCEDURE — 83735 ASSAY OF MAGNESIUM: CPT

## 2025-05-21 PROCEDURE — 80053 COMPREHEN METABOLIC PANEL: CPT

## 2025-05-21 PROCEDURE — 76536 US EXAM OF HEAD AND NECK: CPT

## 2025-06-02 ENCOUNTER — OFFICE VISIT (OUTPATIENT)
Dept: INTERNAL MEDICINE CLINIC | Age: 85
End: 2025-06-02
Payer: MEDICARE

## 2025-06-02 VITALS
SYSTOLIC BLOOD PRESSURE: 136 MMHG | DIASTOLIC BLOOD PRESSURE: 70 MMHG | HEIGHT: 60 IN | HEART RATE: 60 BPM | WEIGHT: 147.2 LBS | BODY MASS INDEX: 28.9 KG/M2 | OXYGEN SATURATION: 99 %

## 2025-06-02 DIAGNOSIS — E83.52 HYPERCALCEMIA: Primary | ICD-10-CM

## 2025-06-02 DIAGNOSIS — E04.2 MULTINODULAR GOITER: ICD-10-CM

## 2025-06-02 DIAGNOSIS — Z78.0 POSTMENOPAUSAL: ICD-10-CM

## 2025-06-02 PROCEDURE — G8417 CALC BMI ABV UP PARAM F/U: HCPCS

## 2025-06-02 PROCEDURE — G8399 PT W/DXA RESULTS DOCUMENT: HCPCS

## 2025-06-02 PROCEDURE — 99214 OFFICE O/P EST MOD 30 MIN: CPT

## 2025-06-02 PROCEDURE — 1036F TOBACCO NON-USER: CPT

## 2025-06-02 PROCEDURE — 3075F SYST BP GE 130 - 139MM HG: CPT

## 2025-06-02 PROCEDURE — 3078F DIAST BP <80 MM HG: CPT

## 2025-06-02 PROCEDURE — G8427 DOCREV CUR MEDS BY ELIG CLIN: HCPCS

## 2025-06-02 PROCEDURE — 1090F PRES/ABSN URINE INCON ASSESS: CPT

## 2025-06-02 PROCEDURE — 1123F ACP DISCUSS/DSCN MKR DOCD: CPT

## 2025-06-02 PROCEDURE — 1159F MED LIST DOCD IN RCRD: CPT

## 2025-06-02 RX ORDER — SENNOSIDES 8.6 MG
650 CAPSULE ORAL EVERY 8 HOURS PRN
COMMUNITY

## 2025-06-02 RX ORDER — CYCLOBENZAPRINE HCL 5 MG
5 TABLET ORAL NIGHTLY
COMMUNITY
Start: 2025-04-23

## 2025-06-02 RX ORDER — ACETAMINOPHEN 325 MG/1
650 TABLET ORAL EVERY 6 HOURS PRN
COMMUNITY

## 2025-06-02 RX ORDER — PSEUDOEPHEDRINE HCL 30 MG
TABLET ORAL
COMMUNITY
Start: 2025-01-17

## 2025-06-02 SDOH — ECONOMIC STABILITY: FOOD INSECURITY: WITHIN THE PAST 12 MONTHS, YOU WORRIED THAT YOUR FOOD WOULD RUN OUT BEFORE YOU GOT MONEY TO BUY MORE.: NEVER TRUE

## 2025-06-02 SDOH — ECONOMIC STABILITY: FOOD INSECURITY: WITHIN THE PAST 12 MONTHS, THE FOOD YOU BOUGHT JUST DIDN'T LAST AND YOU DIDN'T HAVE MONEY TO GET MORE.: NEVER TRUE

## 2025-06-02 ASSESSMENT — PATIENT HEALTH QUESTIONNAIRE - PHQ9
1. LITTLE INTEREST OR PLEASURE IN DOING THINGS: NOT AT ALL
SUM OF ALL RESPONSES TO PHQ QUESTIONS 1-9: 0
2. FEELING DOWN, DEPRESSED OR HOPELESS: NOT AT ALL
SUM OF ALL RESPONSES TO PHQ QUESTIONS 1-9: 0

## 2025-06-02 NOTE — PROGRESS NOTES
atraumatic. Nares normal. Oral mucosa moist.  Hearing grossly intact.  Nodular thyroid.  Neck: Supple, with full range of motion. No gross JVD appreciated.  Respiratory:  Normal effort. Clear to auscultation, without rales or wheezes or rhonchi.  Cardiovascular: RRR, good S1/S2. No rubs, gallops, or murmurs. No lower extremity edema.   Abdomen: Soft, non-tender, non-distended. Bowel sounds present.  Musculoskeletal: No joint swelling or tenderness. Normal tone. No abnormal movements.   Skin: Warm and dry. No rashes or lesions.  Neurologic:  No focal sensory/motor deficits in the upper or lower extremities. Cranial nerves:  grossly non-focal 2-12.     Psychiatric: Alert and oriented, normal insight and thought content.   Capillary Refill: Brisk,< 3 seconds.  Peripheral Pulses: +2 palpable, equal bilaterally.     Diagnostic data:   I have reviewed recent diagnostic testing including labs with patient today.    Assessment and Plan:    1. Hypercalcemia  Assessment & Plan:  Continue calcium supplement, may continue multivitamin.  Follow-up for repeat calcium, iCal, PTH, CMP, magnesium, phosphorus in 1 month.  Follow-up in this office pending results.  Orders:  -     Comprehensive Metabolic Panel; Future  -     Calcium, Ionized; Future  -     Vitamin D 25 Hydroxy; Future  -     Phosphorus; Future  -     Magnesium; Future  2. Multinodular goiter  Assessment & Plan:  Most recent thyroid ultrasound completed 5/21/2025 was stable.  Noted a TI-RADS 4 nodule somewhat decreased in size in the inferior right thyroid lobe.  Also noted another TI-RADS 4 nodule in the right inferior thyroid lobe, a TI-RADS 4 nodule in the mid left thyroid lobe, and a TI-RADS 3 nodule in the inferior left thyroid lobe.  All of these were stable.  None of them were equal to or greater than 1.5 cm.  Will repeat scan in 1 year, ordered today, then if stable recommend repeating US thyroid in 2027, 2030, and 2035. FT4 1.4, TSH 0.38.  Will repeat labs in 1

## 2025-06-03 ENCOUNTER — HOSPITAL ENCOUNTER (OUTPATIENT)
Age: 85
Discharge: HOME | End: 2025-06-05
Attending: INTERNAL MEDICINE
Payer: MEDICARE

## 2025-06-03 VITALS
DIASTOLIC BLOOD PRESSURE: 70 MMHG | BODY MASS INDEX: 28.86 KG/M2 | WEIGHT: 147 LBS | SYSTOLIC BLOOD PRESSURE: 136 MMHG | HEIGHT: 60 IN

## 2025-06-03 DIAGNOSIS — R06.02 SOB (SHORTNESS OF BREATH) ON EXERTION: ICD-10-CM

## 2025-06-03 DIAGNOSIS — E78.00 PURE HYPERCHOLESTEROLEMIA: ICD-10-CM

## 2025-06-03 DIAGNOSIS — I10 PRIMARY HYPERTENSION: ICD-10-CM

## 2025-06-03 DIAGNOSIS — Z98.890 S/P CARDIAC CATH: ICD-10-CM

## 2025-06-03 DIAGNOSIS — I25.10 CORONARY ARTERY DISEASE INVOLVING NATIVE CORONARY ARTERY OF NATIVE HEART WITHOUT ANGINA PECTORIS: ICD-10-CM

## 2025-06-03 DIAGNOSIS — Z95.820 S/P ANGIOPLASTY WITH STENT: ICD-10-CM

## 2025-06-03 LAB
ECHO AO ASC DIAM: 3 CM
ECHO AO ASCENDING AORTA INDEX: 1.83 CM/M2
ECHO AV CUSP MM: 1.6 CM
ECHO AV PEAK GRADIENT: 8 MMHG
ECHO AV PEAK VELOCITY: 1.4 M/S
ECHO AV VELOCITY RATIO: 0.71
ECHO BSA: 1.68 M2
ECHO EST RA PRESSURE: 3 MMHG
ECHO LA AREA 2C: 14.9 CM2
ECHO LA AREA 4C: 18.6 CM2
ECHO LA DIAMETER INDEX: 2.38 CM/M2
ECHO LA DIAMETER: 3.9 CM
ECHO LA MAJOR AXIS: 5.4 CM
ECHO LA MINOR AXIS: 4.9 CM
ECHO LA VOL BP: 45 ML (ref 22–52)
ECHO LA VOL MOD A2C: 37 ML (ref 22–52)
ECHO LA VOL MOD A4C: 50 ML (ref 22–52)
ECHO LA VOL/BSA BIPLANE: 27 ML/M2 (ref 16–34)
ECHO LA VOLUME INDEX MOD A2C: 23 ML/M2 (ref 16–34)
ECHO LA VOLUME INDEX MOD A4C: 30 ML/M2 (ref 16–34)
ECHO LV E' LATERAL VELOCITY: 6.6 CM/S
ECHO LV E' SEPTAL VELOCITY: 5.7 CM/S
ECHO LV EF PHYSICIAN: 65 %
ECHO LV FRACTIONAL SHORTENING: 38 % (ref 28–44)
ECHO LV INTERNAL DIMENSION DIASTOLE INDEX: 2.93 CM/M2
ECHO LV INTERNAL DIMENSION DIASTOLIC: 4.8 CM (ref 3.9–5.3)
ECHO LV INTERNAL DIMENSION SYSTOLIC INDEX: 1.83 CM/M2
ECHO LV INTERNAL DIMENSION SYSTOLIC: 3 CM
ECHO LV ISOVOLUMETRIC RELAXATION TIME (IVRT): 70 MS
ECHO LV IVSD: 0.9 CM (ref 0.6–0.9)
ECHO LV MASS 2D: 147.8 G (ref 67–162)
ECHO LV MASS INDEX 2D: 90.1 G/M2 (ref 43–95)
ECHO LV POSTERIOR WALL DIASTOLIC: 0.9 CM (ref 0.6–0.9)
ECHO LV RELATIVE WALL THICKNESS RATIO: 0.38
ECHO LVOT PEAK GRADIENT: 4 MMHG
ECHO LVOT PEAK VELOCITY: 1 M/S
ECHO MV A VELOCITY: 1.15 M/S
ECHO MV E DECELERATION TIME (DT): 207 MS
ECHO MV E VELOCITY: 0.9 M/S
ECHO MV E/A RATIO: 0.78
ECHO MV E/E' LATERAL: 13.64
ECHO MV E/E' RATIO (AVERAGED): 14.71
ECHO MV E/E' SEPTAL: 15.79
ECHO MV REGURGITANT PEAK GRADIENT: 61 MMHG
ECHO MV REGURGITANT PEAK VELOCITY: 3.9 M/S
ECHO PULMONARY ARTERY END DIASTOLIC PRESSURE: 3 MMHG
ECHO PV MAX VELOCITY: 0.6 M/S
ECHO PV PEAK GRADIENT: 2 MMHG
ECHO PV REGURGITANT MAX VELOCITY: 0.9 M/S
ECHO RIGHT VENTRICULAR SYSTOLIC PRESSURE (RVSP): 30 MMHG
ECHO RV INTERNAL DIMENSION: 2.6 CM
ECHO TV E WAVE: 0.3 M/S
ECHO TV REGURGITANT MAX VELOCITY: 2.61 M/S
ECHO TV REGURGITANT PEAK GRADIENT: 27 MMHG

## 2025-06-03 PROCEDURE — 93306 TTE W/DOPPLER COMPLETE: CPT | Performed by: INTERNAL MEDICINE

## 2025-06-03 PROCEDURE — 93306 TTE W/DOPPLER COMPLETE: CPT

## 2025-06-09 PROBLEM — E83.52 HYPERCALCEMIA: Status: ACTIVE | Noted: 2025-06-09

## 2025-06-09 PROBLEM — Z13.820 OSTEOPOROSIS SCREENING: Status: ACTIVE | Noted: 2025-06-09

## 2025-06-09 PROBLEM — Z78.0 POSTMENOPAUSAL: Status: ACTIVE | Noted: 2025-06-09

## 2025-06-09 NOTE — ASSESSMENT & PLAN NOTE
Most recent thyroid ultrasound completed 5/21/2025 was stable.  Noted a TI-RADS 4 nodule somewhat decreased in size in the inferior right thyroid lobe.  Also noted another TI-RADS 4 nodule in the right inferior thyroid lobe, a TI-RADS 4 nodule in the mid left thyroid lobe, and a TI-RADS 3 nodule in the inferior left thyroid lobe.  All of these were stable.  None of them were equal to or greater than 1.5 cm.  Will repeat scan in 1 year, ordered today, then if stable recommend repeating US thyroid in 2027, 2030, and 2035. FT4 1.4, TSH 0.38.  Will repeat labs in 1 year, ordered today.

## 2025-06-09 NOTE — ASSESSMENT & PLAN NOTE
Continue calcium supplement, may continue multivitamin.  Follow-up for repeat calcium, iCal, PTH, CMP, magnesium, phosphorus in 1 month.  Follow-up in this office pending results.

## 2025-06-09 NOTE — ASSESSMENT & PLAN NOTE
Last DEXA scan completed in April 2023, patient currently due for repeat scan.  Prior scan showed the patient was not at increased risk for fractures.  Patient currently with elevated serum calcium levels.  Evaluating for hypercalcemia.

## 2025-06-18 ENCOUNTER — TELEPHONE (OUTPATIENT)
Dept: CARDIOLOGY CLINIC | Age: 85
End: 2025-06-18

## 2025-07-07 ENCOUNTER — HOSPITAL ENCOUNTER (OUTPATIENT)
Age: 85
Discharge: HOME | End: 2025-07-07
Payer: MEDICARE

## 2025-07-07 ENCOUNTER — HOSPITAL ENCOUNTER (OUTPATIENT)
Dept: WOMENS IMAGING | Age: 85
Discharge: HOME | End: 2025-07-07
Payer: MEDICARE

## 2025-07-07 DIAGNOSIS — Z78.0 POSTMENOPAUSAL: ICD-10-CM

## 2025-07-07 DIAGNOSIS — E83.52 HYPERCALCEMIA: ICD-10-CM

## 2025-07-07 LAB
25(OH)D3 SERPL-MCNC: 42 NG/ML (ref 30–100)
ALBUMIN SERPL BCG-MCNC: 4.4 G/DL (ref 3.4–4.9)
ALP SERPL-CCNC: 83 U/L (ref 38–126)
ALT SERPL W/O P-5'-P-CCNC: 24 U/L (ref 10–35)
ANION GAP SERPL CALC-SCNC: 13 MEQ/L (ref 8–16)
AST SERPL-CCNC: 27 U/L (ref 10–35)
BILIRUB SERPL-MCNC: 0.6 MG/DL (ref 0.3–1.2)
BUN SERPL-MCNC: 17 MG/DL (ref 8–23)
CA-I BLD ISE-SCNC: 1.3 MMOL/L (ref 1.12–1.32)
CALCIUM SERPL-MCNC: 10.6 MG/DL (ref 8.8–10.2)
CHLORIDE SERPL-SCNC: 95 MEQ/L (ref 98–111)
CO2 SERPL-SCNC: 24 MEQ/L (ref 22–29)
CREAT SERPL-MCNC: 0.6 MG/DL (ref 0.5–0.9)
GFR SERPL CREATININE-BSD FRML MDRD: 88 ML/MIN/1.73M2
GLUCOSE SERPL-MCNC: 103 MG/DL (ref 74–109)
MAGNESIUM SERPL-MCNC: 2.2 MG/DL (ref 1.6–2.6)
PHOSPHATE SERPL-MCNC: 3.3 MG/DL (ref 2.5–4.5)
POTASSIUM SERPL-SCNC: 4.7 MEQ/L (ref 3.5–5.2)
PROT SERPL-MCNC: 6.9 G/DL (ref 6.4–8.3)
SODIUM SERPL-SCNC: 132 MEQ/L (ref 135–145)

## 2025-07-07 PROCEDURE — 83735 ASSAY OF MAGNESIUM: CPT

## 2025-07-07 PROCEDURE — 84100 ASSAY OF PHOSPHORUS: CPT

## 2025-07-07 PROCEDURE — 77080 DXA BONE DENSITY AXIAL: CPT

## 2025-07-07 PROCEDURE — 82306 VITAMIN D 25 HYDROXY: CPT

## 2025-07-07 PROCEDURE — 82330 ASSAY OF CALCIUM: CPT

## 2025-07-07 PROCEDURE — 36415 COLL VENOUS BLD VENIPUNCTURE: CPT

## 2025-07-07 PROCEDURE — 80053 COMPREHEN METABOLIC PANEL: CPT

## 2025-07-09 PROBLEM — Z13.820 OSTEOPOROSIS SCREENING: Status: RESOLVED | Noted: 2025-06-09 | Resolved: 2025-07-09

## 2025-09-05 RX ORDER — SPIRONOLACTONE AND HYDROCHLOROTHIAZIDE 25; 25 MG/1; MG/1
TABLET ORAL
Qty: 45 TABLET | Refills: 0 | Status: SHIPPED | OUTPATIENT
Start: 2025-09-05